# Patient Record
Sex: MALE | Race: WHITE | NOT HISPANIC OR LATINO | Employment: OTHER | URBAN - METROPOLITAN AREA
[De-identification: names, ages, dates, MRNs, and addresses within clinical notes are randomized per-mention and may not be internally consistent; named-entity substitution may affect disease eponyms.]

---

## 2021-01-01 ENCOUNTER — APPOINTMENT (OUTPATIENT)
Dept: GENERAL RADIOLOGY | Facility: HOSPITAL | Age: 71
End: 2021-01-01

## 2021-01-01 ENCOUNTER — READMISSION MANAGEMENT (OUTPATIENT)
Dept: CALL CENTER | Facility: HOSPITAL | Age: 71
End: 2021-01-01

## 2021-01-01 ENCOUNTER — APPOINTMENT (OUTPATIENT)
Dept: CT IMAGING | Facility: HOSPITAL | Age: 71
End: 2021-01-01

## 2021-01-01 ENCOUNTER — HOSPITAL ENCOUNTER (INPATIENT)
Facility: HOSPITAL | Age: 71
LOS: 2 days | Discharge: HOSPICE | End: 2021-03-04
Attending: EMERGENCY MEDICINE | Admitting: FAMILY MEDICINE

## 2021-01-01 ENCOUNTER — HOSPITAL ENCOUNTER (INPATIENT)
Facility: HOSPITAL | Age: 71
LOS: 1 days | End: 2021-03-04
Attending: INTERNAL MEDICINE | Admitting: INTERNAL MEDICINE

## 2021-01-01 ENCOUNTER — APPOINTMENT (OUTPATIENT)
Dept: CARDIOLOGY | Facility: HOSPITAL | Age: 71
End: 2021-01-01

## 2021-01-01 ENCOUNTER — HOSPITAL ENCOUNTER (INPATIENT)
Facility: HOSPITAL | Age: 71
LOS: 2 days | Discharge: HOME | End: 2021-01-18
Attending: EMERGENCY MEDICINE | Admitting: INTERNAL MEDICINE

## 2021-01-01 ENCOUNTER — HOSPITAL ENCOUNTER (OUTPATIENT)
Facility: HOSPITAL | Age: 71
Setting detail: OBSERVATION
Discharge: HOME | End: 2021-01-23
Attending: EMERGENCY MEDICINE | Admitting: INTERNAL MEDICINE

## 2021-01-01 VITALS — HEART RATE: 46 BPM | OXYGEN SATURATION: 81 %

## 2021-01-01 VITALS
HEART RATE: 80 BPM | HEIGHT: 67 IN | TEMPERATURE: 98.2 F | SYSTOLIC BLOOD PRESSURE: 144 MMHG | WEIGHT: 209.3 LBS | DIASTOLIC BLOOD PRESSURE: 96 MMHG | BODY MASS INDEX: 32.85 KG/M2 | OXYGEN SATURATION: 95 % | RESPIRATION RATE: 16 BRPM

## 2021-01-01 VITALS
WEIGHT: 215.4 LBS | DIASTOLIC BLOOD PRESSURE: 61 MMHG | OXYGEN SATURATION: 95 % | TEMPERATURE: 99 F | HEART RATE: 83 BPM | RESPIRATION RATE: 16 BRPM | SYSTOLIC BLOOD PRESSURE: 134 MMHG | BODY MASS INDEX: 34.62 KG/M2 | HEIGHT: 66 IN

## 2021-01-01 VITALS
OXYGEN SATURATION: 87 % | HEIGHT: 66 IN | SYSTOLIC BLOOD PRESSURE: 103 MMHG | RESPIRATION RATE: 28 BRPM | WEIGHT: 215.17 LBS | TEMPERATURE: 99.8 F | DIASTOLIC BLOOD PRESSURE: 46 MMHG | BODY MASS INDEX: 34.58 KG/M2 | HEART RATE: 109 BPM

## 2021-01-01 DIAGNOSIS — C61 PROSTATE CANCER METASTATIC TO BONE (HCC): ICD-10-CM

## 2021-01-01 DIAGNOSIS — K11.20 PAROTITIS: ICD-10-CM

## 2021-01-01 DIAGNOSIS — Z86.79 HISTORY OF CORONARY ARTERY DISEASE: ICD-10-CM

## 2021-01-01 DIAGNOSIS — N39.0 ACUTE UTI: ICD-10-CM

## 2021-01-01 DIAGNOSIS — C79.51 PROSTATE CANCER METASTATIC TO BONE (HCC): ICD-10-CM

## 2021-01-01 DIAGNOSIS — Z85.07 HISTORY OF PANCREATIC CANCER: ICD-10-CM

## 2021-01-01 DIAGNOSIS — R07.9 CHEST PAIN, UNSPECIFIED TYPE: ICD-10-CM

## 2021-01-01 DIAGNOSIS — R55 SYNCOPE, UNSPECIFIED SYNCOPE TYPE: Primary | ICD-10-CM

## 2021-01-01 DIAGNOSIS — R53.1 GENERALIZED WEAKNESS: Primary | ICD-10-CM

## 2021-01-01 DIAGNOSIS — R41.82 ALTERED MENTAL STATUS, UNSPECIFIED ALTERED MENTAL STATUS TYPE: Primary | ICD-10-CM

## 2021-01-01 DIAGNOSIS — R73.9 HYPERGLYCEMIA: ICD-10-CM

## 2021-01-01 DIAGNOSIS — R77.8 ELEVATED TROPONIN: ICD-10-CM

## 2021-01-01 DIAGNOSIS — E16.2 HYPOGLYCEMIA: ICD-10-CM

## 2021-01-01 DIAGNOSIS — R29.6 RECURRENT FALLS: ICD-10-CM

## 2021-01-01 DIAGNOSIS — E86.0 DEHYDRATION: ICD-10-CM

## 2021-01-01 LAB
ALBUMIN SERPL-MCNC: 1.5 G/DL (ref 3.5–5.2)
ALBUMIN SERPL-MCNC: 1.6 G/DL (ref 3.5–5.2)
ALBUMIN SERPL-MCNC: 2.2 G/DL (ref 3.5–5.2)
ALBUMIN SERPL-MCNC: 2.8 G/DL (ref 3.5–5.2)
ALBUMIN SERPL-MCNC: 3.4 G/DL (ref 3.5–5.2)
ALBUMIN SERPL-MCNC: 3.5 G/DL (ref 3.5–5.2)
ALBUMIN/GLOB SERPL: 0.3 G/DL
ALBUMIN/GLOB SERPL: 0.3 G/DL
ALBUMIN/GLOB SERPL: 0.5 G/DL
ALBUMIN/GLOB SERPL: 0.6 G/DL
ALBUMIN/GLOB SERPL: 0.7 G/DL
ALBUMIN/GLOB SERPL: 0.9 G/DL
ALP SERPL-CCNC: 511 U/L (ref 39–117)
ALP SERPL-CCNC: 534 U/L (ref 39–117)
ALP SERPL-CCNC: 536 U/L (ref 39–117)
ALP SERPL-CCNC: 574 U/L (ref 39–117)
ALP SERPL-CCNC: 640 U/L (ref 39–117)
ALP SERPL-CCNC: 696 U/L (ref 39–117)
ALT SERPL W P-5'-P-CCNC: 11 U/L (ref 1–41)
ALT SERPL W P-5'-P-CCNC: 13 U/L (ref 1–41)
ALT SERPL W P-5'-P-CCNC: 21 U/L (ref 1–41)
ALT SERPL W P-5'-P-CCNC: 6 U/L (ref 1–41)
ALT SERPL W P-5'-P-CCNC: 7 U/L (ref 1–41)
ALT SERPL W P-5'-P-CCNC: 8 U/L (ref 1–41)
ANION GAP SERPL CALCULATED.3IONS-SCNC: 10 MMOL/L (ref 5–15)
ANION GAP SERPL CALCULATED.3IONS-SCNC: 11 MMOL/L (ref 5–15)
ANION GAP SERPL CALCULATED.3IONS-SCNC: 11 MMOL/L (ref 5–15)
ANION GAP SERPL CALCULATED.3IONS-SCNC: 12 MMOL/L (ref 5–15)
ANION GAP SERPL CALCULATED.3IONS-SCNC: 13 MMOL/L (ref 5–15)
ANION GAP SERPL CALCULATED.3IONS-SCNC: 14 MMOL/L (ref 5–15)
ANION GAP SERPL CALCULATED.3IONS-SCNC: 19 MMOL/L (ref 5–15)
ANION GAP SERPL CALCULATED.3IONS-SCNC: 20 MMOL/L (ref 5–15)
ANISOCYTOSIS BLD QL: ABNORMAL
ANISOCYTOSIS BLD QL: NORMAL
ARTERIAL PATENCY WRIST A: ABNORMAL
ARTERIAL PATENCY WRIST A: ABNORMAL
AST SERPL-CCNC: 108 U/L (ref 1–40)
AST SERPL-CCNC: 22 U/L (ref 1–40)
AST SERPL-CCNC: 27 U/L (ref 1–40)
AST SERPL-CCNC: 34 U/L (ref 1–40)
AST SERPL-CCNC: 48 U/L (ref 1–40)
AST SERPL-CCNC: 70 U/L (ref 1–40)
ATMOSPHERIC PRESS: ABNORMAL MM[HG]
ATMOSPHERIC PRESS: ABNORMAL MM[HG]
B PARAPERT DNA SPEC QL NAA+PROBE: NOT DETECTED
B PERT DNA SPEC QL NAA+PROBE: NOT DETECTED
B-OH-BUTYR SERPL-SCNC: 4.37 MMOL/L (ref 0.02–0.27)
BACTERIA BLD CULT: ABNORMAL
BACTERIA SPEC AEROBE CULT: ABNORMAL
BACTERIA SPEC AEROBE CULT: NORMAL
BACTERIA SPEC AEROBE CULT: NORMAL
BACTERIA UR QL AUTO: ABNORMAL /HPF
BACTERIA UR QL AUTO: NORMAL /HPF
BASE EXCESS BLDA CALC-SCNC: -3.3 MMOL/L (ref 0–2)
BASE EXCESS BLDA CALC-SCNC: 1.1 MMOL/L (ref 0–2)
BASOPHILS # BLD AUTO: 0.02 10*3/MM3 (ref 0–0.2)
BASOPHILS # BLD AUTO: 0.03 10*3/MM3 (ref 0–0.2)
BASOPHILS # BLD AUTO: 0.03 10*3/MM3 (ref 0–0.2)
BASOPHILS # BLD AUTO: 0.05 10*3/MM3 (ref 0–0.2)
BASOPHILS # BLD AUTO: 0.07 10*3/MM3 (ref 0–0.2)
BASOPHILS # BLD MANUAL: 0 10*3/MM3 (ref 0–0.2)
BASOPHILS # BLD MANUAL: 0.13 10*3/MM3 (ref 0–0.2)
BASOPHILS NFR BLD AUTO: 0 % (ref 0–1.5)
BASOPHILS NFR BLD AUTO: 0.5 % (ref 0–1.5)
BASOPHILS NFR BLD AUTO: 0.6 % (ref 0–1.5)
BASOPHILS NFR BLD AUTO: 0.7 % (ref 0–1.5)
BASOPHILS NFR BLD AUTO: 0.8 % (ref 0–1.5)
BASOPHILS NFR BLD AUTO: 1 % (ref 0–1.5)
BASOPHILS NFR BLD AUTO: 1 % (ref 0–1.5)
BDY SITE: ABNORMAL
BH CV ECHO MEAS - AO MAX PG (FULL): 8.8 MMHG
BH CV ECHO MEAS - AO MAX PG: 13.2 MMHG
BH CV ECHO MEAS - AO ROOT AREA (BSA CORRECTED): 1.6
BH CV ECHO MEAS - AO ROOT AREA: 8.5 CM^2
BH CV ECHO MEAS - AO ROOT DIAM: 3.3 CM
BH CV ECHO MEAS - AO V2 MAX: 181.3 CM/SEC
BH CV ECHO MEAS - AVA(V,A): 2.8 CM^2
BH CV ECHO MEAS - AVA(V,D): 2.8 CM^2
BH CV ECHO MEAS - BSA(HAYCOCK): 2.2 M^2
BH CV ECHO MEAS - BSA: 2.1 M^2
BH CV ECHO MEAS - BZI_BMI: 35.2 KILOGRAMS/M^2
BH CV ECHO MEAS - BZI_METRIC_HEIGHT: 167.6 CM
BH CV ECHO MEAS - BZI_METRIC_WEIGHT: 98.9 KG
BH CV ECHO MEAS - EDV(CUBED): 180.7 ML
BH CV ECHO MEAS - EDV(TEICH): 157 ML
BH CV ECHO MEAS - EF(CUBED): 50.5 %
BH CV ECHO MEAS - EF(TEICH): 42 %
BH CV ECHO MEAS - ESV(CUBED): 89.4 ML
BH CV ECHO MEAS - ESV(TEICH): 91 ML
BH CV ECHO MEAS - FS: 20.9 %
BH CV ECHO MEAS - IVS/LVPW: 0.86
BH CV ECHO MEAS - IVSD: 0.91 CM
BH CV ECHO MEAS - LA DIMENSION: 4.4 CM
BH CV ECHO MEAS - LA/AO: 1.3
BH CV ECHO MEAS - LV MASS(C)D: 219 GRAMS
BH CV ECHO MEAS - LV MASS(C)DI: 105.6 GRAMS/M^2
BH CV ECHO MEAS - LV MAX PG: 4.4 MMHG
BH CV ECHO MEAS - LV MEAN PG: 2.2 MMHG
BH CV ECHO MEAS - LV V1 MAX: 104.6 CM/SEC
BH CV ECHO MEAS - LV V1 MEAN: 67.4 CM/SEC
BH CV ECHO MEAS - LV V1 VTI: 19.4 CM
BH CV ECHO MEAS - LVIDD: 5.7 CM
BH CV ECHO MEAS - LVIDS: 4.5 CM
BH CV ECHO MEAS - LVOT AREA (M): 4.9 CM^2
BH CV ECHO MEAS - LVOT AREA: 4.8 CM^2
BH CV ECHO MEAS - LVOT DIAM: 2.5 CM
BH CV ECHO MEAS - LVPWD: 1.1 CM
BH CV ECHO MEAS - PA ACC SLOPE: 869.4 CM/SEC^2
BH CV ECHO MEAS - PA ACC TIME: 0.13 SEC
BH CV ECHO MEAS - PA MAX PG: 8.1 MMHG
BH CV ECHO MEAS - PA PR(ACCEL): 20.4 MMHG
BH CV ECHO MEAS - PA V2 MAX: 142.3 CM/SEC
BH CV ECHO MEAS - SI(CUBED): 44 ML/M^2
BH CV ECHO MEAS - SI(LVOT): 45.4 ML/M^2
BH CV ECHO MEAS - SI(TEICH): 31.8 ML/M^2
BH CV ECHO MEAS - SV(CUBED): 91.3 ML
BH CV ECHO MEAS - SV(LVOT): 94.1 ML
BH CV ECHO MEAS - SV(TEICH): 66 ML
BILIRUB SERPL-MCNC: 0.3 MG/DL (ref 0–1.2)
BILIRUB SERPL-MCNC: 0.8 MG/DL (ref 0–1.2)
BILIRUB SERPL-MCNC: 0.8 MG/DL (ref 0–1.2)
BILIRUB SERPL-MCNC: 1 MG/DL (ref 0–1.2)
BILIRUB UR QL STRIP: NEGATIVE
BODY TEMPERATURE: 37 C
BODY TEMPERATURE: 37 C
BUN SERPL-MCNC: 10 MG/DL (ref 8–23)
BUN SERPL-MCNC: 12 MG/DL (ref 8–23)
BUN SERPL-MCNC: 12 MG/DL (ref 8–23)
BUN SERPL-MCNC: 13 MG/DL (ref 8–23)
BUN SERPL-MCNC: 14 MG/DL (ref 8–23)
BUN SERPL-MCNC: 14 MG/DL (ref 8–23)
BUN SERPL-MCNC: 15 MG/DL (ref 8–23)
BUN SERPL-MCNC: 15 MG/DL (ref 8–23)
BUN SERPL-MCNC: 45 MG/DL (ref 8–23)
BUN SERPL-MCNC: 53 MG/DL (ref 8–23)
BUN SERPL-MCNC: 77 MG/DL (ref 8–23)
BUN SERPL-MCNC: 90 MG/DL (ref 8–23)
BUN/CREAT SERPL: 10.2 (ref 7–25)
BUN/CREAT SERPL: 12 (ref 7–25)
BUN/CREAT SERPL: 12 (ref 7–25)
BUN/CREAT SERPL: 15.2 (ref 7–25)
BUN/CREAT SERPL: 15.9 (ref 7–25)
BUN/CREAT SERPL: 16.3 (ref 7–25)
BUN/CREAT SERPL: 17.6 (ref 7–25)
BUN/CREAT SERPL: 18.8 (ref 7–25)
BUN/CREAT SERPL: 28.7 (ref 7–25)
BUN/CREAT SERPL: 35.3 (ref 7–25)
BUN/CREAT SERPL: 38.1 (ref 7–25)
BUN/CREAT SERPL: 38.7 (ref 7–25)
C PNEUM DNA NPH QL NAA+NON-PROBE: NOT DETECTED
CALCIUM SPEC-SCNC: 7.1 MG/DL (ref 8.6–10.5)
CALCIUM SPEC-SCNC: 7.1 MG/DL (ref 8.6–10.5)
CALCIUM SPEC-SCNC: 7.5 MG/DL (ref 8.6–10.5)
CALCIUM SPEC-SCNC: 8.2 MG/DL (ref 8.6–10.5)
CALCIUM SPEC-SCNC: 8.5 MG/DL (ref 8.6–10.5)
CALCIUM SPEC-SCNC: 8.6 MG/DL (ref 8.6–10.5)
CALCIUM SPEC-SCNC: 8.6 MG/DL (ref 8.6–10.5)
CALCIUM SPEC-SCNC: 8.7 MG/DL (ref 8.6–10.5)
CALCIUM SPEC-SCNC: 9.1 MG/DL (ref 8.6–10.5)
CALCIUM SPEC-SCNC: 9.2 MG/DL (ref 8.6–10.5)
CHLORIDE SERPL-SCNC: 102 MMOL/L (ref 98–107)
CHLORIDE SERPL-SCNC: 103 MMOL/L (ref 98–107)
CHLORIDE SERPL-SCNC: 104 MMOL/L (ref 98–107)
CHLORIDE SERPL-SCNC: 105 MMOL/L (ref 98–107)
CHLORIDE SERPL-SCNC: 105 MMOL/L (ref 98–107)
CHLORIDE SERPL-SCNC: 109 MMOL/L (ref 98–107)
CHLORIDE SERPL-SCNC: 113 MMOL/L (ref 98–107)
CHLORIDE SERPL-SCNC: 115 MMOL/L (ref 98–107)
CHLORIDE SERPL-SCNC: 93 MMOL/L (ref 98–107)
CHLORIDE SERPL-SCNC: 94 MMOL/L (ref 98–107)
CHLORIDE SERPL-SCNC: 97 MMOL/L (ref 98–107)
CHLORIDE SERPL-SCNC: 98 MMOL/L (ref 98–107)
CHOLEST SERPL-MCNC: 88 MG/DL (ref 0–200)
CK SERPL-CCNC: 68 U/L (ref 20–200)
CLARITY UR: ABNORMAL
CLARITY UR: ABNORMAL
CLARITY UR: CLEAR
CO2 BLDA-SCNC: 22.2 MMOL/L (ref 22–33)
CO2 BLDA-SCNC: 26.1 MMOL/L (ref 22–33)
CO2 SERPL-SCNC: 18 MMOL/L (ref 22–29)
CO2 SERPL-SCNC: 21 MMOL/L (ref 22–29)
CO2 SERPL-SCNC: 21 MMOL/L (ref 22–29)
CO2 SERPL-SCNC: 22 MMOL/L (ref 22–29)
CO2 SERPL-SCNC: 22 MMOL/L (ref 22–29)
CO2 SERPL-SCNC: 23 MMOL/L (ref 22–29)
CO2 SERPL-SCNC: 24 MMOL/L (ref 22–29)
CO2 SERPL-SCNC: 25 MMOL/L (ref 22–29)
CO2 SERPL-SCNC: 26 MMOL/L (ref 22–29)
CO2 SERPL-SCNC: 27 MMOL/L (ref 22–29)
COHGB MFR BLD: 1.1 % (ref 0–2)
COHGB MFR BLD: 1.4 % (ref 0–2)
COLOR UR: ABNORMAL
COLOR UR: YELLOW
COLOR UR: YELLOW
CORTIS AM PEAK SERPL-MCNC: 17.79 MCG/DL
CREAT SERPL-MCNC: 0.68 MG/DL (ref 0.76–1.27)
CREAT SERPL-MCNC: 0.8 MG/DL (ref 0.76–1.27)
CREAT SERPL-MCNC: 0.83 MG/DL (ref 0.76–1.27)
CREAT SERPL-MCNC: 0.86 MG/DL (ref 0.76–1.27)
CREAT SERPL-MCNC: 0.88 MG/DL (ref 0.76–1.27)
CREAT SERPL-MCNC: 0.99 MG/DL (ref 0.76–1.27)
CREAT SERPL-MCNC: 1.08 MG/DL (ref 0.76–1.27)
CREAT SERPL-MCNC: 1.18 MG/DL (ref 0.76–1.27)
CREAT SERPL-MCNC: 1.5 MG/DL (ref 0.76–1.27)
CREAT SERPL-MCNC: 1.57 MG/DL (ref 0.76–1.27)
CREAT SERPL-MCNC: 1.99 MG/DL (ref 0.76–1.27)
CREAT SERPL-MCNC: 2.36 MG/DL (ref 0.76–1.27)
D DIMER PPP FEU-MCNC: 19.93 MCGFEU/ML (ref 0–0.56)
D-LACTATE SERPL-SCNC: 1.4 MMOL/L (ref 0.5–2)
D-LACTATE SERPL-SCNC: 2.5 MMOL/L (ref 0.5–2)
D-LACTATE SERPL-SCNC: 2.5 MMOL/L (ref 0.5–2)
D-LACTATE SERPL-SCNC: 2.9 MMOL/L (ref 0.5–2)
D-LACTATE SERPL-SCNC: 4.1 MMOL/L (ref 0.5–2)
DACRYOCYTES BLD QL SMEAR: ABNORMAL
DACRYOCYTES BLD QL SMEAR: NORMAL
DEPRECATED RDW RBC AUTO: 70.5 FL (ref 37–54)
DEPRECATED RDW RBC AUTO: 70.7 FL (ref 37–54)
DEPRECATED RDW RBC AUTO: 71.3 FL (ref 37–54)
DEPRECATED RDW RBC AUTO: 72.4 FL (ref 37–54)
DEPRECATED RDW RBC AUTO: 72.7 FL (ref 37–54)
DEPRECATED RDW RBC AUTO: 73.3 FL (ref 37–54)
DEPRECATED RDW RBC AUTO: 73.7 FL (ref 37–54)
DEPRECATED RDW RBC AUTO: 76.8 FL (ref 37–54)
EOSINOPHIL # BLD AUTO: 0.02 10*3/MM3 (ref 0–0.4)
EOSINOPHIL # BLD AUTO: 0.03 10*3/MM3 (ref 0–0.4)
EOSINOPHIL # BLD AUTO: 0.04 10*3/MM3 (ref 0–0.4)
EOSINOPHIL # BLD AUTO: 0.07 10*3/MM3 (ref 0–0.4)
EOSINOPHIL # BLD AUTO: 0.12 10*3/MM3 (ref 0–0.4)
EOSINOPHIL # BLD MANUAL: 0 10*3/MM3 (ref 0–0.4)
EOSINOPHIL # BLD MANUAL: 0 10*3/MM3 (ref 0–0.4)
EOSINOPHIL NFR BLD AUTO: 0.3 % (ref 0.3–6.2)
EOSINOPHIL NFR BLD AUTO: 0.7 % (ref 0.3–6.2)
EOSINOPHIL NFR BLD AUTO: 0.9 % (ref 0.3–6.2)
EOSINOPHIL NFR BLD AUTO: 1.4 % (ref 0.3–6.2)
EOSINOPHIL NFR BLD AUTO: 1.9 % (ref 0.3–6.2)
EOSINOPHIL NFR BLD MANUAL: 0 % (ref 0.3–6.2)
EOSINOPHIL NFR BLD MANUAL: 0 % (ref 0.3–6.2)
EPAP: 0
ERYTHROCYTE [DISTWIDTH] IN BLOOD BY AUTOMATED COUNT: 19.9 % (ref 12.3–15.4)
ERYTHROCYTE [DISTWIDTH] IN BLOOD BY AUTOMATED COUNT: 20 % (ref 12.3–15.4)
ERYTHROCYTE [DISTWIDTH] IN BLOOD BY AUTOMATED COUNT: 20.1 % (ref 12.3–15.4)
ERYTHROCYTE [DISTWIDTH] IN BLOOD BY AUTOMATED COUNT: 20.3 % (ref 12.3–15.4)
ERYTHROCYTE [DISTWIDTH] IN BLOOD BY AUTOMATED COUNT: 20.9 % (ref 12.3–15.4)
ERYTHROCYTE [DISTWIDTH] IN BLOOD BY AUTOMATED COUNT: 21.2 % (ref 12.3–15.4)
FINE GRAN CASTS URNS QL MICRO: ABNORMAL /LPF
FLUAV RNA RESP QL NAA+PROBE: NOT DETECTED
FLUAV RNA RESP QL NAA+PROBE: NOT DETECTED
FLUAV SUBTYP SPEC NAA+PROBE: NOT DETECTED
FLUBV RNA ISLT QL NAA+PROBE: NOT DETECTED
FLUBV RNA RESP QL NAA+PROBE: NOT DETECTED
FLUBV RNA RESP QL NAA+PROBE: NOT DETECTED
GFR SERPL CREATININE-BSD FRML MDRD: 116 ML/MIN/1.73
GFR SERPL CREATININE-BSD FRML MDRD: 28 ML/MIN/1.73
GFR SERPL CREATININE-BSD FRML MDRD: 34 ML/MIN/1.73
GFR SERPL CREATININE-BSD FRML MDRD: 44 ML/MIN/1.73
GFR SERPL CREATININE-BSD FRML MDRD: 47 ML/MIN/1.73
GFR SERPL CREATININE-BSD FRML MDRD: 61 ML/MIN/1.73
GFR SERPL CREATININE-BSD FRML MDRD: 68 ML/MIN/1.73
GFR SERPL CREATININE-BSD FRML MDRD: 75 ML/MIN/1.73
GFR SERPL CREATININE-BSD FRML MDRD: 86 ML/MIN/1.73
GFR SERPL CREATININE-BSD FRML MDRD: 88 ML/MIN/1.73
GFR SERPL CREATININE-BSD FRML MDRD: 92 ML/MIN/1.73
GFR SERPL CREATININE-BSD FRML MDRD: 96 ML/MIN/1.73
GLOBULIN UR ELPH-MCNC: 4.1 GM/DL
GLOBULIN UR ELPH-MCNC: 4.7 GM/DL
GLOBULIN UR ELPH-MCNC: 4.8 GM/DL
GLOBULIN UR ELPH-MCNC: 5.1 GM/DL
GLUCOSE BLDC GLUCOMTR-MCNC: 101 MG/DL (ref 70–130)
GLUCOSE BLDC GLUCOMTR-MCNC: 105 MG/DL (ref 70–130)
GLUCOSE BLDC GLUCOMTR-MCNC: 112 MG/DL (ref 70–130)
GLUCOSE BLDC GLUCOMTR-MCNC: 149 MG/DL (ref 70–130)
GLUCOSE BLDC GLUCOMTR-MCNC: 150 MG/DL (ref 70–130)
GLUCOSE BLDC GLUCOMTR-MCNC: 159 MG/DL (ref 70–130)
GLUCOSE BLDC GLUCOMTR-MCNC: 159 MG/DL (ref 70–130)
GLUCOSE BLDC GLUCOMTR-MCNC: 181 MG/DL (ref 70–130)
GLUCOSE BLDC GLUCOMTR-MCNC: 195 MG/DL (ref 70–130)
GLUCOSE BLDC GLUCOMTR-MCNC: 199 MG/DL (ref 70–130)
GLUCOSE BLDC GLUCOMTR-MCNC: 211 MG/DL (ref 70–130)
GLUCOSE BLDC GLUCOMTR-MCNC: 265 MG/DL (ref 70–130)
GLUCOSE BLDC GLUCOMTR-MCNC: 265 MG/DL (ref 70–130)
GLUCOSE BLDC GLUCOMTR-MCNC: 288 MG/DL (ref 70–130)
GLUCOSE BLDC GLUCOMTR-MCNC: 302 MG/DL (ref 70–130)
GLUCOSE BLDC GLUCOMTR-MCNC: 302 MG/DL (ref 70–130)
GLUCOSE BLDC GLUCOMTR-MCNC: 305 MG/DL (ref 70–130)
GLUCOSE BLDC GLUCOMTR-MCNC: 312 MG/DL (ref 70–130)
GLUCOSE BLDC GLUCOMTR-MCNC: 315 MG/DL (ref 70–130)
GLUCOSE BLDC GLUCOMTR-MCNC: 332 MG/DL (ref 70–130)
GLUCOSE BLDC GLUCOMTR-MCNC: 339 MG/DL (ref 70–130)
GLUCOSE BLDC GLUCOMTR-MCNC: 348 MG/DL (ref 70–130)
GLUCOSE BLDC GLUCOMTR-MCNC: 349 MG/DL (ref 70–130)
GLUCOSE BLDC GLUCOMTR-MCNC: 357 MG/DL (ref 70–130)
GLUCOSE BLDC GLUCOMTR-MCNC: 379 MG/DL (ref 70–130)
GLUCOSE BLDC GLUCOMTR-MCNC: 381 MG/DL (ref 70–130)
GLUCOSE BLDC GLUCOMTR-MCNC: 385 MG/DL (ref 70–130)
GLUCOSE BLDC GLUCOMTR-MCNC: 386 MG/DL (ref 70–130)
GLUCOSE BLDC GLUCOMTR-MCNC: 388 MG/DL (ref 70–130)
GLUCOSE BLDC GLUCOMTR-MCNC: 396 MG/DL (ref 70–130)
GLUCOSE BLDC GLUCOMTR-MCNC: 402 MG/DL (ref 70–130)
GLUCOSE BLDC GLUCOMTR-MCNC: 404 MG/DL (ref 70–130)
GLUCOSE BLDC GLUCOMTR-MCNC: 404 MG/DL (ref 70–130)
GLUCOSE BLDC GLUCOMTR-MCNC: 408 MG/DL (ref 70–130)
GLUCOSE BLDC GLUCOMTR-MCNC: 41 MG/DL (ref 70–130)
GLUCOSE BLDC GLUCOMTR-MCNC: 414 MG/DL (ref 70–130)
GLUCOSE BLDC GLUCOMTR-MCNC: 418 MG/DL (ref 70–130)
GLUCOSE BLDC GLUCOMTR-MCNC: 419 MG/DL (ref 70–130)
GLUCOSE BLDC GLUCOMTR-MCNC: 422 MG/DL (ref 70–130)
GLUCOSE BLDC GLUCOMTR-MCNC: 429 MG/DL (ref 70–130)
GLUCOSE BLDC GLUCOMTR-MCNC: 429 MG/DL (ref 70–130)
GLUCOSE BLDC GLUCOMTR-MCNC: 432 MG/DL (ref 70–130)
GLUCOSE BLDC GLUCOMTR-MCNC: 435 MG/DL (ref 70–130)
GLUCOSE BLDC GLUCOMTR-MCNC: 446 MG/DL (ref 70–130)
GLUCOSE BLDC GLUCOMTR-MCNC: 449 MG/DL (ref 70–130)
GLUCOSE BLDC GLUCOMTR-MCNC: 449 MG/DL (ref 70–130)
GLUCOSE BLDC GLUCOMTR-MCNC: 450 MG/DL (ref 70–130)
GLUCOSE BLDC GLUCOMTR-MCNC: 474 MG/DL (ref 70–130)
GLUCOSE BLDC GLUCOMTR-MCNC: 476 MG/DL (ref 70–130)
GLUCOSE BLDC GLUCOMTR-MCNC: 478 MG/DL (ref 70–130)
GLUCOSE BLDC GLUCOMTR-MCNC: 488 MG/DL (ref 70–130)
GLUCOSE BLDC GLUCOMTR-MCNC: 491 MG/DL (ref 70–130)
GLUCOSE BLDC GLUCOMTR-MCNC: 491 MG/DL (ref 70–130)
GLUCOSE BLDC GLUCOMTR-MCNC: 493 MG/DL (ref 70–130)
GLUCOSE BLDC GLUCOMTR-MCNC: 505 MG/DL (ref 70–130)
GLUCOSE BLDC GLUCOMTR-MCNC: 51 MG/DL (ref 70–130)
GLUCOSE BLDC GLUCOMTR-MCNC: 519 MG/DL (ref 70–130)
GLUCOSE BLDC GLUCOMTR-MCNC: 537 MG/DL (ref 70–130)
GLUCOSE BLDC GLUCOMTR-MCNC: 545 MG/DL (ref 70–130)
GLUCOSE BLDC GLUCOMTR-MCNC: 547 MG/DL (ref 70–130)
GLUCOSE BLDC GLUCOMTR-MCNC: 568 MG/DL (ref 70–130)
GLUCOSE BLDC GLUCOMTR-MCNC: 573 MG/DL (ref 70–130)
GLUCOSE BLDC GLUCOMTR-MCNC: 76 MG/DL (ref 70–130)
GLUCOSE BLDC GLUCOMTR-MCNC: >599 MG/DL (ref 70–130)
GLUCOSE SERPL-MCNC: 144 MG/DL (ref 65–99)
GLUCOSE SERPL-MCNC: 159 MG/DL (ref 65–99)
GLUCOSE SERPL-MCNC: 239 MG/DL (ref 65–99)
GLUCOSE SERPL-MCNC: 380 MG/DL (ref 65–99)
GLUCOSE SERPL-MCNC: 386 MG/DL (ref 65–99)
GLUCOSE SERPL-MCNC: 411 MG/DL (ref 65–99)
GLUCOSE SERPL-MCNC: 414 MG/DL (ref 65–99)
GLUCOSE SERPL-MCNC: 443 MG/DL (ref 65–99)
GLUCOSE SERPL-MCNC: 488 MG/DL (ref 65–99)
GLUCOSE SERPL-MCNC: 498 MG/DL (ref 65–99)
GLUCOSE SERPL-MCNC: 546 MG/DL (ref 65–99)
GLUCOSE SERPL-MCNC: 58 MG/DL (ref 65–99)
GLUCOSE UR STRIP-MCNC: ABNORMAL MG/DL
GLUCOSE UR STRIP-MCNC: NEGATIVE MG/DL
GLUCOSE UR STRIP-MCNC: NEGATIVE MG/DL
HADV DNA SPEC NAA+PROBE: NOT DETECTED
HBA1C MFR BLD: 9.5 % (ref 4.8–5.6)
HCO3 BLDA-SCNC: 21.2 MMOL/L (ref 20–26)
HCO3 BLDA-SCNC: 25 MMOL/L (ref 20–26)
HCOV 229E RNA SPEC QL NAA+PROBE: NOT DETECTED
HCOV HKU1 RNA SPEC QL NAA+PROBE: NOT DETECTED
HCOV NL63 RNA SPEC QL NAA+PROBE: NOT DETECTED
HCOV OC43 RNA SPEC QL NAA+PROBE: NOT DETECTED
HCT VFR BLD AUTO: 28.5 % (ref 37.5–51)
HCT VFR BLD AUTO: 29.6 % (ref 37.5–51)
HCT VFR BLD AUTO: 29.8 % (ref 37.5–51)
HCT VFR BLD AUTO: 30.4 % (ref 37.5–51)
HCT VFR BLD AUTO: 33.2 % (ref 37.5–51)
HCT VFR BLD AUTO: 33.8 % (ref 37.5–51)
HCT VFR BLD AUTO: 34.5 % (ref 37.5–51)
HCT VFR BLD AUTO: 34.6 % (ref 37.5–51)
HCT VFR BLD CALC: 23.7 %
HCT VFR BLD CALC: 29.8 %
HDLC SERPL-MCNC: 32 MG/DL (ref 40–60)
HGB BLD-MCNC: 10.1 G/DL (ref 13–17.7)
HGB BLD-MCNC: 10.6 G/DL (ref 13–17.7)
HGB BLD-MCNC: 8.3 G/DL (ref 13–17.7)
HGB BLD-MCNC: 8.4 G/DL (ref 13–17.7)
HGB BLD-MCNC: 9 G/DL (ref 13–17.7)
HGB BLD-MCNC: 9.1 G/DL (ref 13–17.7)
HGB BLD-MCNC: 9.8 G/DL (ref 13–17.7)
HGB BLD-MCNC: 9.9 G/DL (ref 13–17.7)
HGB BLDA-MCNC: 7.7 G/DL (ref 13.5–17.5)
HGB BLDA-MCNC: 9.7 G/DL (ref 13.5–17.5)
HGB UR QL STRIP.AUTO: ABNORMAL
HGB UR QL STRIP.AUTO: ABNORMAL
HGB UR QL STRIP.AUTO: NEGATIVE
HMPV RNA NPH QL NAA+NON-PROBE: NOT DETECTED
HOLD SPECIMEN: NORMAL
HPIV1 RNA SPEC QL NAA+PROBE: NOT DETECTED
HPIV2 RNA SPEC QL NAA+PROBE: NOT DETECTED
HPIV3 RNA NPH QL NAA+PROBE: NOT DETECTED
HPIV4 P GENE NPH QL NAA+PROBE: NOT DETECTED
HYALINE CASTS UR QL AUTO: ABNORMAL /LPF
HYALINE CASTS UR QL AUTO: NORMAL /LPF
IMM GRANULOCYTES # BLD AUTO: 0.04 10*3/MM3 (ref 0–0.05)
IMM GRANULOCYTES # BLD AUTO: 0.06 10*3/MM3 (ref 0–0.05)
IMM GRANULOCYTES # BLD AUTO: 0.16 10*3/MM3 (ref 0–0.05)
IMM GRANULOCYTES # BLD AUTO: 0.21 10*3/MM3 (ref 0–0.05)
IMM GRANULOCYTES # BLD AUTO: 0.44 10*3/MM3 (ref 0–0.05)
IMM GRANULOCYTES NFR BLD AUTO: 0.9 % (ref 0–0.5)
IMM GRANULOCYTES NFR BLD AUTO: 1.4 % (ref 0–0.5)
IMM GRANULOCYTES NFR BLD AUTO: 2.6 % (ref 0–0.5)
IMM GRANULOCYTES NFR BLD AUTO: 4.1 % (ref 0–0.5)
IMM GRANULOCYTES NFR BLD AUTO: 6.5 % (ref 0–0.5)
INHALED O2 CONCENTRATION: 21 %
INHALED O2 CONCENTRATION: 40 %
INR PPP: 1.43 (ref 0.85–1.16)
IPAP: 0
KETONES UR QL STRIP: ABNORMAL
KETONES UR QL STRIP: NEGATIVE
KETONES UR QL STRIP: NEGATIVE
LACTATE HOLD SPECIMEN: NORMAL
LACTATE HOLD SPECIMEN: NORMAL
LDLC SERPL CALC-MCNC: 25 MG/DL (ref 0–100)
LDLC/HDLC SERPL: 0.55 {RATIO}
LEUKOCYTE ESTERASE UR QL STRIP.AUTO: ABNORMAL
LEUKOCYTE ESTERASE UR QL STRIP.AUTO: NEGATIVE
LEUKOCYTE ESTERASE UR QL STRIP.AUTO: NEGATIVE
LIPASE SERPL-CCNC: 19 U/L (ref 13–60)
LV EF 2D ECHO EST: 55 %
LYMPHOCYTES # BLD AUTO: 0.91 10*3/MM3 (ref 0.7–3.1)
LYMPHOCYTES # BLD AUTO: 1.02 10*3/MM3 (ref 0.7–3.1)
LYMPHOCYTES # BLD AUTO: 1.03 10*3/MM3 (ref 0.7–3.1)
LYMPHOCYTES # BLD AUTO: 1.26 10*3/MM3 (ref 0.7–3.1)
LYMPHOCYTES # BLD AUTO: 1.52 10*3/MM3 (ref 0.7–3.1)
LYMPHOCYTES # BLD MANUAL: 1.19 10*3/MM3 (ref 0.7–3.1)
LYMPHOCYTES # BLD MANUAL: 1.68 10*3/MM3 (ref 0.7–3.1)
LYMPHOCYTES NFR BLD AUTO: 15.3 % (ref 19.6–45.3)
LYMPHOCYTES NFR BLD AUTO: 21.2 % (ref 19.6–45.3)
LYMPHOCYTES NFR BLD AUTO: 23 % (ref 19.6–45.3)
LYMPHOCYTES NFR BLD AUTO: 24.3 % (ref 19.6–45.3)
LYMPHOCYTES NFR BLD AUTO: 24.8 % (ref 19.6–45.3)
LYMPHOCYTES NFR BLD MANUAL: 13 % (ref 19.6–45.3)
LYMPHOCYTES NFR BLD MANUAL: 18 % (ref 19.6–45.3)
LYMPHOCYTES NFR BLD MANUAL: 5 % (ref 5–12)
LYMPHOCYTES NFR BLD MANUAL: 8 % (ref 5–12)
M PNEUMO IGG SER IA-ACNC: NOT DETECTED
MACROCYTES BLD QL SMEAR: ABNORMAL
MACROCYTES BLD QL SMEAR: NORMAL
MAGNESIUM SERPL-MCNC: 1.9 MG/DL (ref 1.6–2.4)
MAGNESIUM SERPL-MCNC: 1.9 MG/DL (ref 1.6–2.4)
MAGNESIUM SERPL-MCNC: 2 MG/DL (ref 1.6–2.4)
MAGNESIUM SERPL-MCNC: 2.1 MG/DL (ref 1.6–2.4)
MAGNESIUM SERPL-MCNC: 2.2 MG/DL (ref 1.6–2.4)
MAGNESIUM SERPL-MCNC: 2.2 MG/DL (ref 1.6–2.4)
MAGNESIUM SERPL-MCNC: 2.3 MG/DL (ref 1.6–2.4)
MAGNESIUM SERPL-MCNC: 2.6 MG/DL (ref 1.6–2.4)
MAXIMAL PREDICTED HEART RATE: 152 BPM
MCH RBC QN AUTO: 28.2 PG (ref 26.6–33)
MCH RBC QN AUTO: 28.5 PG (ref 26.6–33)
MCH RBC QN AUTO: 28.6 PG (ref 26.6–33)
MCH RBC QN AUTO: 28.9 PG (ref 26.6–33)
MCH RBC QN AUTO: 29.4 PG (ref 26.6–33)
MCH RBC QN AUTO: 29.5 PG (ref 26.6–33)
MCH RBC QN AUTO: 29.7 PG (ref 26.6–33)
MCH RBC QN AUTO: 29.7 PG (ref 26.6–33)
MCHC RBC AUTO-ENTMCNC: 28 G/DL (ref 31.5–35.7)
MCHC RBC AUTO-ENTMCNC: 29 G/DL (ref 31.5–35.7)
MCHC RBC AUTO-ENTMCNC: 29.3 G/DL (ref 31.5–35.7)
MCHC RBC AUTO-ENTMCNC: 29.5 G/DL (ref 31.5–35.7)
MCHC RBC AUTO-ENTMCNC: 29.6 G/DL (ref 31.5–35.7)
MCHC RBC AUTO-ENTMCNC: 29.8 G/DL (ref 31.5–35.7)
MCHC RBC AUTO-ENTMCNC: 30.5 G/DL (ref 31.5–35.7)
MCHC RBC AUTO-ENTMCNC: 30.6 G/DL (ref 31.5–35.7)
MCV RBC AUTO: 100.7 FL (ref 79–97)
MCV RBC AUTO: 101.5 FL (ref 79–97)
MCV RBC AUTO: 96.2 FL (ref 79–97)
MCV RBC AUTO: 96.8 FL (ref 79–97)
MCV RBC AUTO: 96.9 FL (ref 79–97)
MCV RBC AUTO: 96.9 FL (ref 79–97)
MCV RBC AUTO: 98.6 FL (ref 79–97)
MCV RBC AUTO: 99.7 FL (ref 79–97)
METAMYELOCYTES NFR BLD MANUAL: 1 % (ref 0–0)
METAMYELOCYTES NFR BLD MANUAL: 2 % (ref 0–0)
METHGB BLD QL: 0.2 % (ref 0–1.5)
METHGB BLD QL: 0.5 % (ref 0–1.5)
MODALITY: ABNORMAL
MODALITY: ABNORMAL
MONOCYTES # BLD AUTO: 0.37 10*3/MM3 (ref 0.1–0.9)
MONOCYTES # BLD AUTO: 0.43 10*3/MM3 (ref 0.1–0.9)
MONOCYTES # BLD AUTO: 0.53 10*3/MM3 (ref 0.1–0.9)
MONOCYTES # BLD AUTO: 0.6 10*3/MM3 (ref 0.1–0.9)
MONOCYTES # BLD AUTO: 0.64 10*3/MM3 (ref 0.1–0.9)
MONOCYTES # BLD AUTO: 0.66 10*3/MM3 (ref 0.1–0.9)
MONOCYTES # BLD AUTO: 0.87 10*3/MM3 (ref 0.1–0.9)
MONOCYTES NFR BLD AUTO: 13 % (ref 5–12)
MONOCYTES NFR BLD AUTO: 13.9 % (ref 5–12)
MONOCYTES NFR BLD AUTO: 8.6 % (ref 5–12)
MONOCYTES NFR BLD AUTO: 8.9 % (ref 5–12)
MONOCYTES NFR BLD AUTO: 9.7 % (ref 5–12)
MRSA DNA SPEC QL NAA+PROBE: POSITIVE
MYELOCYTES NFR BLD MANUAL: 2 % (ref 0–0)
NEUTROPHILS # BLD AUTO: 10.31 10*3/MM3 (ref 1.7–7)
NEUTROPHILS # BLD AUTO: 4.63 10*3/MM3 (ref 1.7–7)
NEUTROPHILS NFR BLD AUTO: 2.86 10*3/MM3 (ref 1.7–7)
NEUTROPHILS NFR BLD AUTO: 2.86 10*3/MM3 (ref 1.7–7)
NEUTROPHILS NFR BLD AUTO: 2.91 10*3/MM3 (ref 1.7–7)
NEUTROPHILS NFR BLD AUTO: 3.54 10*3/MM3 (ref 1.7–7)
NEUTROPHILS NFR BLD AUTO: 4.58 10*3/MM3 (ref 1.7–7)
NEUTROPHILS NFR BLD AUTO: 56.1 % (ref 42.7–76)
NEUTROPHILS NFR BLD AUTO: 56.5 % (ref 42.7–76)
NEUTROPHILS NFR BLD AUTO: 64.5 % (ref 42.7–76)
NEUTROPHILS NFR BLD AUTO: 67.9 % (ref 42.7–76)
NEUTROPHILS NFR BLD AUTO: 68 % (ref 42.7–76)
NEUTROPHILS NFR BLD MANUAL: 30 % (ref 42.7–76)
NEUTROPHILS NFR BLD MANUAL: 60 % (ref 42.7–76)
NEUTS BAND NFR BLD MANUAL: 20 % (ref 0–5)
NEUTS BAND NFR BLD MANUAL: 40 % (ref 0–5)
NITRITE UR QL STRIP: NEGATIVE
NOTE: ABNORMAL
NOTE: ABNORMAL
NRBC BLD AUTO-RTO: 0 /100 WBC (ref 0–0.2)
NRBC BLD AUTO-RTO: 0.3 /100 WBC (ref 0–0.2)
NRBC BLD AUTO-RTO: 1.8 /100 WBC (ref 0–0.2)
NT-PROBNP SERPL-MCNC: 4031 PG/ML (ref 0–900)
NT-PROBNP SERPL-MCNC: 4354 PG/ML (ref 0–900)
NT-PROBNP SERPL-MCNC: 5110 PG/ML (ref 0–900)
NT-PROBNP SERPL-MCNC: 6864 PG/ML (ref 0–900)
OSMOLALITY SERPL: 305 MOSM/KG (ref 275–295)
OVALOCYTES BLD QL SMEAR: ABNORMAL
OVALOCYTES BLD QL SMEAR: NORMAL
OVALOCYTES BLD QL SMEAR: NORMAL
OXYHGB MFR BLDV: 92.1 % (ref 94–99)
OXYHGB MFR BLDV: 95 % (ref 94–99)
PAW @ PEAK INSP FLOW SETTING VENT: 0 CMH2O
PCO2 BLDA: 34.9 MM HG (ref 35–45)
PCO2 BLDA: 35.5 MM HG (ref 35–45)
PCO2 TEMP ADJ BLD: 34.9 MM HG (ref 35–48)
PCO2 TEMP ADJ BLD: 35.5 MM HG (ref 35–48)
PH BLDA: 7.39 PH UNITS (ref 7.35–7.45)
PH BLDA: 7.46 PH UNITS (ref 7.35–7.45)
PH UR STRIP.AUTO: 7.5 [PH] (ref 5–8)
PH UR STRIP.AUTO: <=5 [PH] (ref 5–8)
PH UR STRIP.AUTO: <=5 [PH] (ref 5–8)
PH, TEMP CORRECTED: 7.39 PH UNITS
PH, TEMP CORRECTED: 7.46 PH UNITS
PHOSPHATE SERPL-MCNC: 1.8 MG/DL (ref 2.5–4.5)
PHOSPHATE SERPL-MCNC: 2 MG/DL (ref 2.5–4.5)
PHOSPHATE SERPL-MCNC: 2.6 MG/DL (ref 2.5–4.5)
PLAT MORPH BLD: NORMAL
PLATELET # BLD AUTO: 192 10*3/MM3 (ref 140–450)
PLATELET # BLD AUTO: 205 10*3/MM3 (ref 140–450)
PLATELET # BLD AUTO: 220 10*3/MM3 (ref 140–450)
PLATELET # BLD AUTO: 231 10*3/MM3 (ref 140–450)
PLATELET # BLD AUTO: 252 10*3/MM3 (ref 140–450)
PLATELET # BLD AUTO: 38 10*3/MM3 (ref 140–450)
PLATELET # BLD AUTO: 45 10*3/MM3 (ref 140–450)
PLATELET # BLD AUTO: 66 10*3/MM3 (ref 140–450)
PMV BLD AUTO: 10 FL (ref 6–12)
PMV BLD AUTO: 10.1 FL (ref 6–12)
PMV BLD AUTO: 10.3 FL (ref 6–12)
PMV BLD AUTO: 10.6 FL (ref 6–12)
PMV BLD AUTO: 11.6 FL (ref 6–12)
PMV BLD AUTO: 13.7 FL (ref 6–12)
PMV BLD AUTO: 9.8 FL (ref 6–12)
PMV BLD AUTO: 9.8 FL (ref 6–12)
PO2 BLDA: 67.5 MM HG (ref 83–108)
PO2 BLDA: 82.2 MM HG (ref 83–108)
PO2 TEMP ADJ BLD: 67.5 MM HG (ref 83–108)
PO2 TEMP ADJ BLD: 82.2 MM HG (ref 83–108)
POTASSIUM SERPL-SCNC: 3.2 MMOL/L (ref 3.5–5.2)
POTASSIUM SERPL-SCNC: 3.7 MMOL/L (ref 3.5–5.2)
POTASSIUM SERPL-SCNC: 3.8 MMOL/L (ref 3.5–5.2)
POTASSIUM SERPL-SCNC: 3.8 MMOL/L (ref 3.5–5.2)
POTASSIUM SERPL-SCNC: 3.9 MMOL/L (ref 3.5–5.2)
POTASSIUM SERPL-SCNC: 3.9 MMOL/L (ref 3.5–5.2)
POTASSIUM SERPL-SCNC: 4 MMOL/L (ref 3.5–5.2)
POTASSIUM SERPL-SCNC: 4.1 MMOL/L (ref 3.5–5.2)
POTASSIUM SERPL-SCNC: 4.2 MMOL/L (ref 3.5–5.2)
POTASSIUM SERPL-SCNC: 4.4 MMOL/L (ref 3.5–5.2)
POTASSIUM SERPL-SCNC: 4.4 MMOL/L (ref 3.5–5.2)
POTASSIUM SERPL-SCNC: 4.5 MMOL/L (ref 3.5–5.2)
PROCALCITONIN SERPL-MCNC: 2.94 NG/ML (ref 0–0.25)
PROT SERPL-MCNC: 6.2 G/DL (ref 6–8.5)
PROT SERPL-MCNC: 6.7 G/DL (ref 6–8.5)
PROT SERPL-MCNC: 7 G/DL (ref 6–8.5)
PROT SERPL-MCNC: 7.6 G/DL (ref 6–8.5)
PROT SERPL-MCNC: 7.6 G/DL (ref 6–8.5)
PROT SERPL-MCNC: 8.2 G/DL (ref 6–8.5)
PROT UR QL STRIP: ABNORMAL
PROT UR QL STRIP: NEGATIVE
PROT UR QL STRIP: NEGATIVE
PROTHROMBIN TIME: 17.1 SECONDS (ref 11.5–14)
QT INTERVAL: 374 MS
QT INTERVAL: 384 MS
QT INTERVAL: 430 MS
QT INTERVAL: 432 MS
QT INTERVAL: 436 MS
QT INTERVAL: 494 MS
QTC INTERVAL: 450 MS
QTC INTERVAL: 495 MS
QTC INTERVAL: 507 MS
QTC INTERVAL: 509 MS
QTC INTERVAL: 514 MS
QTC INTERVAL: 656 MS
RBC # BLD AUTO: 2.94 10*6/MM3 (ref 4.14–5.8)
RBC # BLD AUTO: 2.94 10*6/MM3 (ref 4.14–5.8)
RBC # BLD AUTO: 3.08 10*6/MM3 (ref 4.14–5.8)
RBC # BLD AUTO: 3.16 10*6/MM3 (ref 4.14–5.8)
RBC # BLD AUTO: 3.33 10*6/MM3 (ref 4.14–5.8)
RBC # BLD AUTO: 3.33 10*6/MM3 (ref 4.14–5.8)
RBC # BLD AUTO: 3.5 10*6/MM3 (ref 4.14–5.8)
RBC # BLD AUTO: 3.57 10*6/MM3 (ref 4.14–5.8)
RBC # UR: ABNORMAL /HPF
RBC # UR: NORMAL /HPF
REF LAB TEST METHOD: ABNORMAL
REF LAB TEST METHOD: NORMAL
RHINOVIRUS RNA SPEC NAA+PROBE: NOT DETECTED
RSV RNA NPH QL NAA+NON-PROBE: NOT DETECTED
SARS-COV-2 RNA NPH QL NAA+NON-PROBE: NOT DETECTED
SARS-COV-2 RNA RESP QL NAA+PROBE: NOT DETECTED
SARS-COV-2 RNA RESP QL NAA+PROBE: NOT DETECTED
SCHISTOCYTES BLD QL SMEAR: NORMAL
SMALL PLATELETS BLD QL SMEAR: ABNORMAL
SMALL PLATELETS BLD QL SMEAR: NORMAL
SODIUM SERPL-SCNC: 131 MMOL/L (ref 136–145)
SODIUM SERPL-SCNC: 132 MMOL/L (ref 136–145)
SODIUM SERPL-SCNC: 133 MMOL/L (ref 136–145)
SODIUM SERPL-SCNC: 134 MMOL/L (ref 136–145)
SODIUM SERPL-SCNC: 135 MMOL/L (ref 136–145)
SODIUM SERPL-SCNC: 139 MMOL/L (ref 136–145)
SODIUM SERPL-SCNC: 141 MMOL/L (ref 136–145)
SODIUM SERPL-SCNC: 141 MMOL/L (ref 136–145)
SODIUM SERPL-SCNC: 142 MMOL/L (ref 136–145)
SODIUM SERPL-SCNC: 144 MMOL/L (ref 136–145)
SODIUM SERPL-SCNC: 147 MMOL/L (ref 136–145)
SODIUM SERPL-SCNC: 152 MMOL/L (ref 136–145)
SP GR UR STRIP: 1.01 (ref 1–1.03)
SP GR UR STRIP: 1.02 (ref 1–1.03)
SP GR UR STRIP: 1.02 (ref 1–1.03)
SQUAMOUS #/AREA URNS HPF: ABNORMAL /HPF
SQUAMOUS #/AREA URNS HPF: NORMAL /HPF
STRESS TARGET HR: 129 BPM
T4 FREE SERPL-MCNC: 1.41 NG/DL (ref 0.93–1.7)
TOTAL RATE: 0 BREATHS/MINUTE
TRIGL SERPL-MCNC: 192 MG/DL (ref 0–150)
TROPONIN T SERPL-MCNC: 0.01 NG/ML (ref 0–0.03)
TROPONIN T SERPL-MCNC: 0.01 NG/ML (ref 0–0.03)
TROPONIN T SERPL-MCNC: 0.02 NG/ML (ref 0–0.03)
TROPONIN T SERPL-MCNC: 0.03 NG/ML (ref 0–0.03)
TROPONIN T SERPL-MCNC: 0.05 NG/ML (ref 0–0.03)
TROPONIN T SERPL-MCNC: 0.05 NG/ML (ref 0–0.03)
TSH SERPL DL<=0.05 MIU/L-ACNC: 2.23 UIU/ML (ref 0.27–4.2)
TSH SERPL DL<=0.05 MIU/L-ACNC: 6.25 UIU/ML (ref 0.27–4.2)
UROBILINOGEN UR QL STRIP: ABNORMAL
UROBILINOGEN UR QL STRIP: ABNORMAL
UROBILINOGEN UR QL STRIP: NORMAL
VENTILATOR MODE: ABNORMAL
VLDLC SERPL-MCNC: 31 MG/DL (ref 5–40)
WBC # BLD AUTO: 12.89 10*3/MM3 (ref 3.4–10.8)
WBC # BLD AUTO: 4.29 10*3/MM3 (ref 3.4–10.8)
WBC # BLD AUTO: 4.43 10*3/MM3 (ref 3.4–10.8)
WBC # BLD AUTO: 5.09 10*3/MM3 (ref 3.4–10.8)
WBC # BLD AUTO: 5.41 10*3/MM3 (ref 3.4–10.8)
WBC # BLD AUTO: 6.26 10*3/MM3 (ref 3.4–10.8)
WBC # BLD AUTO: 6.62 10*3/MM3 (ref 3.4–10.8)
WBC # BLD AUTO: 6.74 10*3/MM3 (ref 3.4–10.8)
WBC MORPH BLD: NORMAL
WBC UR QL AUTO: ABNORMAL /HPF
WBC UR QL AUTO: NORMAL /HPF
WHOLE BLOOD HOLD SPECIMEN: NORMAL

## 2021-01-01 PROCEDURE — 63710000001 INSULIN DETEMIR PER 5 UNITS: Performed by: INTERNAL MEDICINE

## 2021-01-01 PROCEDURE — 70450 CT HEAD/BRAIN W/O DYE: CPT

## 2021-01-01 PROCEDURE — 83735 ASSAY OF MAGNESIUM: CPT | Performed by: EMERGENCY MEDICINE

## 2021-01-01 PROCEDURE — 82962 GLUCOSE BLOOD TEST: CPT

## 2021-01-01 PROCEDURE — 63710000001 INSULIN REGULAR HUMAN PER 5 UNITS: Performed by: FAMILY MEDICINE

## 2021-01-01 PROCEDURE — 80048 BASIC METABOLIC PNL TOTAL CA: CPT | Performed by: INTERNAL MEDICINE

## 2021-01-01 PROCEDURE — G0378 HOSPITAL OBSERVATION PER HR: HCPCS

## 2021-01-01 PROCEDURE — 63710000001 INSULIN DETEMIR PER 5 UNITS: Performed by: FAMILY MEDICINE

## 2021-01-01 PROCEDURE — 80053 COMPREHEN METABOLIC PANEL: CPT | Performed by: INTERNAL MEDICINE

## 2021-01-01 PROCEDURE — 83735 ASSAY OF MAGNESIUM: CPT | Performed by: PHYSICIAN ASSISTANT

## 2021-01-01 PROCEDURE — 63710000001 INSULIN REGULAR HUMAN PER 5 UNITS: Performed by: NURSE PRACTITIONER

## 2021-01-01 PROCEDURE — 71045 X-RAY EXAM CHEST 1 VIEW: CPT

## 2021-01-01 PROCEDURE — 80048 BASIC METABOLIC PNL TOTAL CA: CPT | Performed by: NURSE PRACTITIONER

## 2021-01-01 PROCEDURE — 80053 COMPREHEN METABOLIC PANEL: CPT | Performed by: NURSE PRACTITIONER

## 2021-01-01 PROCEDURE — 82805 BLOOD GASES W/O2 SATURATION: CPT

## 2021-01-01 PROCEDURE — 85007 BL SMEAR W/DIFF WBC COUNT: CPT | Performed by: EMERGENCY MEDICINE

## 2021-01-01 PROCEDURE — 87040 BLOOD CULTURE FOR BACTERIA: CPT | Performed by: PHYSICIAN ASSISTANT

## 2021-01-01 PROCEDURE — 83880 ASSAY OF NATRIURETIC PEPTIDE: CPT | Performed by: EMERGENCY MEDICINE

## 2021-01-01 PROCEDURE — 63710000001 INSULIN REGULAR HUMAN PER 5 UNITS: Performed by: INTERNAL MEDICINE

## 2021-01-01 PROCEDURE — 85379 FIBRIN DEGRADATION QUANT: CPT | Performed by: FAMILY MEDICINE

## 2021-01-01 PROCEDURE — 99226 PR SBSQ OBSERVATION CARE/DAY 35 MINUTES: CPT | Performed by: NURSE PRACTITIONER

## 2021-01-01 PROCEDURE — 82375 ASSAY CARBOXYHB QUANT: CPT

## 2021-01-01 PROCEDURE — 85025 COMPLETE CBC W/AUTO DIFF WBC: CPT | Performed by: INTERNAL MEDICINE

## 2021-01-01 PROCEDURE — 25810000003 SODIUM CHLORIDE 0.9 % WITH KCL 20 MEQ 20-0.9 MEQ/L-% SOLUTION: Performed by: PHYSICIAN ASSISTANT

## 2021-01-01 PROCEDURE — 99285 EMERGENCY DEPT VISIT HI MDM: CPT

## 2021-01-01 PROCEDURE — 63710000001 INSULIN LISPRO (HUMAN) PER 5 UNITS: Performed by: INTERNAL MEDICINE

## 2021-01-01 PROCEDURE — 99217 PR OBSERVATION CARE DISCHARGE MANAGEMENT: CPT | Performed by: NURSE PRACTITIONER

## 2021-01-01 PROCEDURE — 85007 BL SMEAR W/DIFF WBC COUNT: CPT | Performed by: INTERNAL MEDICINE

## 2021-01-01 PROCEDURE — 93005 ELECTROCARDIOGRAM TRACING: CPT | Performed by: EMERGENCY MEDICINE

## 2021-01-01 PROCEDURE — 25010000002 MORPHINE PER 10 MG: Performed by: FAMILY MEDICINE

## 2021-01-01 PROCEDURE — 84100 ASSAY OF PHOSPHORUS: CPT | Performed by: INTERNAL MEDICINE

## 2021-01-01 PROCEDURE — 36600 WITHDRAWAL OF ARTERIAL BLOOD: CPT

## 2021-01-01 PROCEDURE — 99220 PR INITIAL OBSERVATION CARE/DAY 70 MINUTES: CPT | Performed by: INTERNAL MEDICINE

## 2021-01-01 PROCEDURE — 84484 ASSAY OF TROPONIN QUANT: CPT | Performed by: EMERGENCY MEDICINE

## 2021-01-01 PROCEDURE — 99238 HOSP IP/OBS DSCHRG MGMT 30/<: CPT | Performed by: FAMILY MEDICINE

## 2021-01-01 PROCEDURE — 97165 OT EVAL LOW COMPLEX 30 MIN: CPT

## 2021-01-01 PROCEDURE — 25010000002 LORAZEPAM PER 2 MG: Performed by: FAMILY MEDICINE

## 2021-01-01 PROCEDURE — 80053 COMPREHEN METABOLIC PANEL: CPT | Performed by: PHYSICIAN ASSISTANT

## 2021-01-01 PROCEDURE — C9803 HOPD COVID-19 SPEC COLLECT: HCPCS

## 2021-01-01 PROCEDURE — 83605 ASSAY OF LACTIC ACID: CPT | Performed by: PHYSICIAN ASSISTANT

## 2021-01-01 PROCEDURE — 99204 OFFICE O/P NEW MOD 45 MIN: CPT | Performed by: INTERNAL MEDICINE

## 2021-01-01 PROCEDURE — 93306 TTE W/DOPPLER COMPLETE: CPT

## 2021-01-01 PROCEDURE — 83880 ASSAY OF NATRIURETIC PEPTIDE: CPT | Performed by: INTERNAL MEDICINE

## 2021-01-01 PROCEDURE — P9612 CATHETERIZE FOR URINE SPEC: HCPCS

## 2021-01-01 PROCEDURE — 80048 BASIC METABOLIC PNL TOTAL CA: CPT | Performed by: PHYSICIAN ASSISTANT

## 2021-01-01 PROCEDURE — 84443 ASSAY THYROID STIM HORMONE: CPT | Performed by: EMERGENCY MEDICINE

## 2021-01-01 PROCEDURE — 81001 URINALYSIS AUTO W/SCOPE: CPT | Performed by: EMERGENCY MEDICINE

## 2021-01-01 PROCEDURE — 93010 ELECTROCARDIOGRAM REPORT: CPT | Performed by: INTERNAL MEDICINE

## 2021-01-01 PROCEDURE — 99225 PR SBSQ OBSERVATION CARE/DAY 25 MINUTES: CPT | Performed by: FAMILY MEDICINE

## 2021-01-01 PROCEDURE — 83735 ASSAY OF MAGNESIUM: CPT | Performed by: INTERNAL MEDICINE

## 2021-01-01 PROCEDURE — 63710000001 INSULIN LISPRO (HUMAN) PER 5 UNITS: Performed by: PHYSICIAN ASSISTANT

## 2021-01-01 PROCEDURE — 0202U NFCT DS 22 TRGT SARS-COV-2: CPT | Performed by: EMERGENCY MEDICINE

## 2021-01-01 PROCEDURE — 87070 CULTURE OTHR SPECIMN AEROBIC: CPT | Performed by: INTERNAL MEDICINE

## 2021-01-01 PROCEDURE — 81003 URINALYSIS AUTO W/O SCOPE: CPT | Performed by: PHYSICIAN ASSISTANT

## 2021-01-01 PROCEDURE — 85025 COMPLETE CBC W/AUTO DIFF WBC: CPT | Performed by: EMERGENCY MEDICINE

## 2021-01-01 PROCEDURE — 84439 ASSAY OF FREE THYROXINE: CPT | Performed by: EMERGENCY MEDICINE

## 2021-01-01 PROCEDURE — 93306 TTE W/DOPPLER COMPLETE: CPT | Performed by: INTERNAL MEDICINE

## 2021-01-01 PROCEDURE — 80053 COMPREHEN METABOLIC PANEL: CPT | Performed by: EMERGENCY MEDICINE

## 2021-01-01 PROCEDURE — 83735 ASSAY OF MAGNESIUM: CPT | Performed by: NURSE PRACTITIONER

## 2021-01-01 PROCEDURE — 84484 ASSAY OF TROPONIN QUANT: CPT | Performed by: PHYSICIAN ASSISTANT

## 2021-01-01 PROCEDURE — 63710000001 INSULIN LISPRO (HUMAN) PER 5 UNITS: Performed by: FAMILY MEDICINE

## 2021-01-01 PROCEDURE — 25010000003 MAGNESIUM SULFATE 4 GM/100ML SOLUTION: Performed by: INTERNAL MEDICINE

## 2021-01-01 PROCEDURE — 63710000001 INSULIN LISPRO (HUMAN) PER 5 UNITS: Performed by: NURSE PRACTITIONER

## 2021-01-01 PROCEDURE — 82010 KETONE BODYS QUAN: CPT | Performed by: EMERGENCY MEDICINE

## 2021-01-01 PROCEDURE — 99214 OFFICE O/P EST MOD 30 MIN: CPT | Performed by: INTERNAL MEDICINE

## 2021-01-01 PROCEDURE — 25010000002 DAPTOMYCIN PER 1 MG

## 2021-01-01 PROCEDURE — 87186 SC STD MICRODIL/AGAR DIL: CPT | Performed by: INTERNAL MEDICINE

## 2021-01-01 PROCEDURE — 99217 PR OBSERVATION CARE DISCHARGE MANAGEMENT: CPT | Performed by: INTERNAL MEDICINE

## 2021-01-01 PROCEDURE — 85025 COMPLETE CBC W/AUTO DIFF WBC: CPT | Performed by: NURSE PRACTITIONER

## 2021-01-01 PROCEDURE — 99223 1ST HOSP IP/OBS HIGH 75: CPT | Performed by: FAMILY MEDICINE

## 2021-01-01 PROCEDURE — 96366 THER/PROPH/DIAG IV INF ADDON: CPT

## 2021-01-01 PROCEDURE — 97161 PT EVAL LOW COMPLEX 20 MIN: CPT

## 2021-01-01 PROCEDURE — 99213 OFFICE O/P EST LOW 20 MIN: CPT | Performed by: NURSE PRACTITIONER

## 2021-01-01 PROCEDURE — 80061 LIPID PANEL: CPT | Performed by: INTERNAL MEDICINE

## 2021-01-01 PROCEDURE — 87086 URINE CULTURE/COLONY COUNT: CPT | Performed by: EMERGENCY MEDICINE

## 2021-01-01 PROCEDURE — 99213 OFFICE O/P EST LOW 20 MIN: CPT | Performed by: INTERNAL MEDICINE

## 2021-01-01 PROCEDURE — 87147 CULTURE TYPE IMMUNOLOGIC: CPT | Performed by: INTERNAL MEDICINE

## 2021-01-01 PROCEDURE — 83880 ASSAY OF NATRIURETIC PEPTIDE: CPT | Performed by: FAMILY MEDICINE

## 2021-01-01 PROCEDURE — 25010000002 VANCOMYCIN 10 G RECONSTITUTED SOLUTION: Performed by: EMERGENCY MEDICINE

## 2021-01-01 PROCEDURE — 93005 ELECTROCARDIOGRAM TRACING: CPT | Performed by: FAMILY MEDICINE

## 2021-01-01 PROCEDURE — 84100 ASSAY OF PHOSPHORUS: CPT | Performed by: PHYSICIAN ASSISTANT

## 2021-01-01 PROCEDURE — 84484 ASSAY OF TROPONIN QUANT: CPT | Performed by: FAMILY MEDICINE

## 2021-01-01 PROCEDURE — 96375 TX/PRO/DX INJ NEW DRUG ADDON: CPT

## 2021-01-01 PROCEDURE — 99226 PR SBSQ OBSERVATION CARE/DAY 35 MINUTES: CPT | Performed by: FAMILY MEDICINE

## 2021-01-01 PROCEDURE — 71275 CT ANGIOGRAPHY CHEST: CPT

## 2021-01-01 PROCEDURE — 25010000002 MORPHINE PER 10 MG: Performed by: NURSE PRACTITIONER

## 2021-01-01 PROCEDURE — 83605 ASSAY OF LACTIC ACID: CPT | Performed by: INTERNAL MEDICINE

## 2021-01-01 PROCEDURE — 87186 SC STD MICRODIL/AGAR DIL: CPT | Performed by: EMERGENCY MEDICINE

## 2021-01-01 PROCEDURE — 85027 COMPLETE CBC AUTOMATED: CPT | Performed by: PHYSICIAN ASSISTANT

## 2021-01-01 PROCEDURE — 85025 COMPLETE CBC W/AUTO DIFF WBC: CPT | Performed by: PHYSICIAN ASSISTANT

## 2021-01-01 PROCEDURE — 87641 MR-STAPH DNA AMP PROBE: CPT | Performed by: FAMILY MEDICINE

## 2021-01-01 PROCEDURE — G0108 DIAB MANAGE TRN  PER INDIV: HCPCS

## 2021-01-01 PROCEDURE — 99233 SBSQ HOSP IP/OBS HIGH 50: CPT | Performed by: INTERNAL MEDICINE

## 2021-01-01 PROCEDURE — 0 IOPAMIDOL PER 1 ML: Performed by: FAMILY MEDICINE

## 2021-01-01 PROCEDURE — 83036 HEMOGLOBIN GLYCOSYLATED A1C: CPT | Performed by: PHYSICIAN ASSISTANT

## 2021-01-01 PROCEDURE — 87040 BLOOD CULTURE FOR BACTERIA: CPT | Performed by: EMERGENCY MEDICINE

## 2021-01-01 PROCEDURE — 87205 SMEAR GRAM STAIN: CPT | Performed by: INTERNAL MEDICINE

## 2021-01-01 PROCEDURE — 83605 ASSAY OF LACTIC ACID: CPT | Performed by: EMERGENCY MEDICINE

## 2021-01-01 PROCEDURE — 83050 HGB METHEMOGLOBIN QUAN: CPT

## 2021-01-01 PROCEDURE — 85007 BL SMEAR W/DIFF WBC COUNT: CPT | Performed by: NURSE PRACTITIONER

## 2021-01-01 PROCEDURE — 97162 PT EVAL MOD COMPLEX 30 MIN: CPT

## 2021-01-01 PROCEDURE — 96374 THER/PROPH/DIAG INJ IV PUSH: CPT

## 2021-01-01 PROCEDURE — 25010000002 DAPTOMYCIN PER 1 MG: Performed by: INTERNAL MEDICINE

## 2021-01-01 PROCEDURE — 87150 DNA/RNA AMPLIFIED PROBE: CPT | Performed by: EMERGENCY MEDICINE

## 2021-01-01 PROCEDURE — 96368 THER/DIAG CONCURRENT INF: CPT

## 2021-01-01 PROCEDURE — 70486 CT MAXILLOFACIAL W/O DYE: CPT

## 2021-01-01 PROCEDURE — 93005 ELECTROCARDIOGRAM TRACING: CPT | Performed by: PHYSICIAN ASSISTANT

## 2021-01-01 PROCEDURE — 97110 THERAPEUTIC EXERCISES: CPT

## 2021-01-01 PROCEDURE — 84145 PROCALCITONIN (PCT): CPT | Performed by: INTERNAL MEDICINE

## 2021-01-01 PROCEDURE — 87147 CULTURE TYPE IMMUNOLOGIC: CPT | Performed by: EMERGENCY MEDICINE

## 2021-01-01 PROCEDURE — 63710000001 INSULIN DETEMIR PER 5 UNITS: Performed by: PHYSICIAN ASSISTANT

## 2021-01-01 PROCEDURE — 25010000002 PIPERACILLIN SOD-TAZOBACTAM PER 1 G: Performed by: FAMILY MEDICINE

## 2021-01-01 PROCEDURE — 87636 SARSCOV2 & INF A&B AMP PRB: CPT | Performed by: PHYSICIAN ASSISTANT

## 2021-01-01 PROCEDURE — 83930 ASSAY OF BLOOD OSMOLALITY: CPT | Performed by: PHYSICIAN ASSISTANT

## 2021-01-01 PROCEDURE — 25010000002 PIPERACILLIN SOD-TAZOBACTAM PER 1 G: Performed by: PHYSICIAN ASSISTANT

## 2021-01-01 PROCEDURE — 83690 ASSAY OF LIPASE: CPT | Performed by: EMERGENCY MEDICINE

## 2021-01-01 PROCEDURE — 0 IOPAMIDOL PER 1 ML: Performed by: INTERNAL MEDICINE

## 2021-01-01 PROCEDURE — 96365 THER/PROPH/DIAG IV INF INIT: CPT

## 2021-01-01 PROCEDURE — 97530 THERAPEUTIC ACTIVITIES: CPT

## 2021-01-01 PROCEDURE — 63710000001 INSULIN REGULAR HUMAN PER 5 UNITS: Performed by: EMERGENCY MEDICINE

## 2021-01-01 PROCEDURE — 25010000002 ONDANSETRON PER 1 MG: Performed by: EMERGENCY MEDICINE

## 2021-01-01 PROCEDURE — 82533 TOTAL CORTISOL: CPT | Performed by: PHYSICIAN ASSISTANT

## 2021-01-01 PROCEDURE — 85610 PROTHROMBIN TIME: CPT | Performed by: EMERGENCY MEDICINE

## 2021-01-01 PROCEDURE — 82550 ASSAY OF CK (CPK): CPT | Performed by: EMERGENCY MEDICINE

## 2021-01-01 PROCEDURE — 99226 PR SBSQ OBSERVATION CARE/DAY 35 MINUTES: CPT | Performed by: INTERNAL MEDICINE

## 2021-01-01 PROCEDURE — 25010000002 PIPERACILLIN SOD-TAZOBACTAM PER 1 G: Performed by: EMERGENCY MEDICINE

## 2021-01-01 PROCEDURE — 71250 CT THORAX DX C-: CPT

## 2021-01-01 PROCEDURE — 25010000002 VANCOMYCIN 10 G RECONSTITUTED SOLUTION: Performed by: PHYSICIAN ASSISTANT

## 2021-01-01 RX ORDER — PANTOPRAZOLE SODIUM 40 MG/1
40 TABLET, DELAYED RELEASE ORAL DAILY
COMMUNITY

## 2021-01-01 RX ORDER — CARVEDILOL 12.5 MG/1
12.5 TABLET ORAL 2 TIMES DAILY WITH MEALS
Status: DISCONTINUED | OUTPATIENT
Start: 2021-01-01 | End: 2021-01-01 | Stop reason: HOSPADM

## 2021-01-01 RX ORDER — ACETAMINOPHEN 325 MG/1
650 TABLET ORAL EVERY 4 HOURS PRN
Status: DISCONTINUED | OUTPATIENT
Start: 2021-01-01 | End: 2021-01-01 | Stop reason: HOSPADM

## 2021-01-01 RX ORDER — DEXTROSE MONOHYDRATE 25 G/50ML
25 INJECTION, SOLUTION INTRAVENOUS
Status: DISCONTINUED | OUTPATIENT
Start: 2021-01-01 | End: 2021-01-01

## 2021-01-01 RX ORDER — CHOLECALCIFEROL (VITAMIN D3) 125 MCG
5 CAPSULE ORAL NIGHTLY PRN
Status: DISCONTINUED | OUTPATIENT
Start: 2021-01-01 | End: 2021-01-01 | Stop reason: HOSPADM

## 2021-01-01 RX ORDER — LORAZEPAM 2 MG/ML
0.5 INJECTION INTRAMUSCULAR EVERY 4 HOURS PRN
Status: DISCONTINUED | OUTPATIENT
Start: 2021-01-01 | End: 2021-01-01

## 2021-01-01 RX ORDER — NITROGLYCERIN 0.4 MG/1
0.4 TABLET SUBLINGUAL
Status: DISCONTINUED | OUTPATIENT
Start: 2021-01-01 | End: 2021-01-01 | Stop reason: HOSPADM

## 2021-01-01 RX ORDER — LORAZEPAM 2 MG/ML
0.5 INJECTION INTRAMUSCULAR
Status: DISCONTINUED | OUTPATIENT
Start: 2021-01-01 | End: 2021-01-01 | Stop reason: HOSPADM

## 2021-01-01 RX ORDER — INSULIN HUMAN 500 [IU]/ML
INJECTION, SOLUTION SUBCUTANEOUS
Start: 2021-01-01 | End: 2021-01-01 | Stop reason: HOSPADM

## 2021-01-01 RX ORDER — SODIUM CHLORIDE 0.9 % (FLUSH) 0.9 %
10 SYRINGE (ML) INJECTION AS NEEDED
Status: CANCELLED | OUTPATIENT
Start: 2021-01-01

## 2021-01-01 RX ORDER — SODIUM CHLORIDE 0.9 % (FLUSH) 0.9 %
10 SYRINGE (ML) INJECTION EVERY 12 HOURS SCHEDULED
Status: CANCELLED | OUTPATIENT
Start: 2021-01-01

## 2021-01-01 RX ORDER — ONDANSETRON 2 MG/ML
4 INJECTION INTRAMUSCULAR; INTRAVENOUS EVERY 6 HOURS PRN
Status: DISCONTINUED | OUTPATIENT
Start: 2021-01-01 | End: 2021-01-01 | Stop reason: HOSPADM

## 2021-01-01 RX ORDER — PANTOPRAZOLE SODIUM 40 MG/1
40 TABLET, DELAYED RELEASE ORAL DAILY
Status: DISCONTINUED | OUTPATIENT
Start: 2021-01-01 | End: 2021-01-01 | Stop reason: HOSPADM

## 2021-01-01 RX ORDER — DEXTROSE, SODIUM CHLORIDE, AND POTASSIUM CHLORIDE 5; .45; .15 G/100ML; G/100ML; G/100ML
150 INJECTION INTRAVENOUS CONTINUOUS PRN
Status: DISCONTINUED | OUTPATIENT
Start: 2021-01-01 | End: 2021-01-01

## 2021-01-01 RX ORDER — ACETAMINOPHEN 650 MG/1
650 SUPPOSITORY RECTAL EVERY 4 HOURS PRN
Status: DISCONTINUED | OUTPATIENT
Start: 2021-01-01 | End: 2021-01-01 | Stop reason: HOSPADM

## 2021-01-01 RX ORDER — SODIUM CHLORIDE 0.9 % (FLUSH) 0.9 %
10 SYRINGE (ML) INJECTION EVERY 12 HOURS SCHEDULED
Status: DISCONTINUED | OUTPATIENT
Start: 2021-01-01 | End: 2021-03-05 | Stop reason: HOSPADM

## 2021-01-01 RX ORDER — ACETAMINOPHEN 160 MG/5ML
650 SOLUTION ORAL EVERY 4 HOURS PRN
Status: DISCONTINUED | OUTPATIENT
Start: 2021-01-01 | End: 2021-01-01

## 2021-01-01 RX ORDER — SODIUM CHLORIDE 9 MG/ML
1000 INJECTION, SOLUTION INTRAVENOUS CONTINUOUS
Status: DISCONTINUED | OUTPATIENT
Start: 2021-01-01 | End: 2021-01-01

## 2021-01-01 RX ORDER — FUROSEMIDE 20 MG/1
20 TABLET ORAL DAILY
Status: DISCONTINUED | OUTPATIENT
Start: 2021-01-01 | End: 2021-01-01 | Stop reason: HOSPADM

## 2021-01-01 RX ORDER — DULOXETIN HYDROCHLORIDE 60 MG/1
60 CAPSULE, DELAYED RELEASE ORAL DAILY
COMMUNITY

## 2021-01-01 RX ORDER — GLYCOPYRROLATE 0.2 MG/ML
0.2 INJECTION INTRAMUSCULAR; INTRAVENOUS EVERY 4 HOURS PRN
Status: DISCONTINUED | OUTPATIENT
Start: 2021-01-01 | End: 2021-03-05 | Stop reason: HOSPADM

## 2021-01-01 RX ORDER — GABAPENTIN 400 MG/1
400 CAPSULE ORAL 3 TIMES DAILY
COMMUNITY

## 2021-01-01 RX ORDER — ACETAMINOPHEN 325 MG/1
650 TABLET ORAL EVERY 4 HOURS PRN
Status: CANCELLED | OUTPATIENT
Start: 2021-01-01

## 2021-01-01 RX ORDER — CARVEDILOL 6.25 MG/1
6.25 TABLET ORAL DAILY
Status: DISCONTINUED | OUTPATIENT
Start: 2021-01-01 | End: 2021-01-01 | Stop reason: HOSPADM

## 2021-01-01 RX ORDER — KETOROLAC TROMETHAMINE 15 MG/ML
15 INJECTION, SOLUTION INTRAMUSCULAR; INTRAVENOUS EVERY 6 HOURS PRN
Status: DISCONTINUED | OUTPATIENT
Start: 2021-01-01 | End: 2021-03-05 | Stop reason: HOSPADM

## 2021-01-01 RX ORDER — ACETAMINOPHEN 160 MG/5ML
650 SOLUTION ORAL EVERY 4 HOURS PRN
Status: CANCELLED | OUTPATIENT
Start: 2021-01-01

## 2021-01-01 RX ORDER — SODIUM CHLORIDE 0.9 % (FLUSH) 0.9 %
10 SYRINGE (ML) INJECTION EVERY 12 HOURS SCHEDULED
Status: DISCONTINUED | OUTPATIENT
Start: 2021-01-01 | End: 2021-01-01 | Stop reason: HOSPADM

## 2021-01-01 RX ORDER — SODIUM CHLORIDE 0.9 % (FLUSH) 0.9 %
10 SYRINGE (ML) INJECTION ONCE AS NEEDED
Status: DISCONTINUED | OUTPATIENT
Start: 2021-01-01 | End: 2021-01-01

## 2021-01-01 RX ORDER — ACETAMINOPHEN 650 MG/1
650 SUPPOSITORY RECTAL EVERY 4 HOURS PRN
Status: CANCELLED | OUTPATIENT
Start: 2021-01-01

## 2021-01-01 RX ORDER — OXYCODONE HYDROCHLORIDE 15 MG/1
15 TABLET ORAL EVERY 4 HOURS PRN
Status: DISCONTINUED | OUTPATIENT
Start: 2021-01-01 | End: 2021-01-01 | Stop reason: HOSPADM

## 2021-01-01 RX ORDER — NICOTINE POLACRILEX 4 MG
15 LOZENGE BUCCAL
Status: DISCONTINUED | OUTPATIENT
Start: 2021-01-01 | End: 2021-01-01 | Stop reason: HOSPADM

## 2021-01-01 RX ORDER — MAGNESIUM SULFATE HEPTAHYDRATE 40 MG/ML
4 INJECTION, SOLUTION INTRAVENOUS AS NEEDED
Status: DISCONTINUED | OUTPATIENT
Start: 2021-01-01 | End: 2021-01-01 | Stop reason: HOSPADM

## 2021-01-01 RX ORDER — ACETAMINOPHEN 160 MG/5ML
650 SOLUTION ORAL EVERY 4 HOURS PRN
Status: DISCONTINUED | OUTPATIENT
Start: 2021-01-01 | End: 2021-01-01 | Stop reason: HOSPADM

## 2021-01-01 RX ORDER — ISOSORBIDE MONONITRATE 60 MG/1
60 TABLET, EXTENDED RELEASE ORAL DAILY
Status: DISCONTINUED | OUTPATIENT
Start: 2021-01-01 | End: 2021-01-01 | Stop reason: HOSPADM

## 2021-01-01 RX ORDER — SODIUM CHLORIDE 9 MG/ML
100 INJECTION, SOLUTION INTRAVENOUS CONTINUOUS
Status: ACTIVE | OUTPATIENT
Start: 2021-01-01 | End: 2021-01-01

## 2021-01-01 RX ORDER — SODIUM CHLORIDE 0.9 % (FLUSH) 0.9 %
3 SYRINGE (ML) INJECTION EVERY 12 HOURS SCHEDULED
Status: DISCONTINUED | OUTPATIENT
Start: 2021-01-01 | End: 2021-01-01

## 2021-01-01 RX ORDER — OXYCODONE HYDROCHLORIDE 15 MG/1
15 TABLET ORAL EVERY 4 HOURS PRN
COMMUNITY

## 2021-01-01 RX ORDER — MULTIPLE VITAMINS W/ MINERALS TAB 9MG-400MCG
1 TAB ORAL DAILY
COMMUNITY

## 2021-01-01 RX ORDER — SODIUM CHLORIDE AND POTASSIUM CHLORIDE 300; 900 MG/100ML; MG/100ML
250 INJECTION, SOLUTION INTRAVENOUS CONTINUOUS PRN
Status: DISCONTINUED | OUTPATIENT
Start: 2021-01-01 | End: 2021-01-01

## 2021-01-01 RX ORDER — CASTOR OIL AND BALSAM, PERU 788; 87 MG/G; MG/G
OINTMENT TOPICAL EVERY 12 HOURS SCHEDULED
Status: DISCONTINUED | OUTPATIENT
Start: 2021-01-01 | End: 2021-03-05 | Stop reason: HOSPADM

## 2021-01-01 RX ORDER — SODIUM CHLORIDE 0.9 % (FLUSH) 0.9 %
10 SYRINGE (ML) INJECTION AS NEEDED
Status: DISCONTINUED | OUTPATIENT
Start: 2021-01-01 | End: 2021-01-01

## 2021-01-01 RX ORDER — SODIUM CHLORIDE 9 MG/ML
250 INJECTION, SOLUTION INTRAVENOUS CONTINUOUS PRN
Status: DISCONTINUED | OUTPATIENT
Start: 2021-01-01 | End: 2021-01-01

## 2021-01-01 RX ORDER — POLYMYXIN B SULFATE AND TRIMETHOPRIM 1; 10000 MG/ML; [USP'U]/ML
1 SOLUTION OPHTHALMIC
Status: DISCONTINUED | OUTPATIENT
Start: 2021-01-01 | End: 2021-01-01 | Stop reason: HOSPADM

## 2021-01-01 RX ORDER — NICOTINE POLACRILEX 4 MG
15 LOZENGE BUCCAL
Status: DISCONTINUED | OUTPATIENT
Start: 2021-01-01 | End: 2021-01-01

## 2021-01-01 RX ORDER — ASPIRIN 81 MG/1
324 TABLET, CHEWABLE ORAL ONCE
Status: DISCONTINUED | OUTPATIENT
Start: 2021-01-01 | End: 2021-01-01

## 2021-01-01 RX ORDER — DEXTROSE MONOHYDRATE 25 G/50ML
25 INJECTION, SOLUTION INTRAVENOUS
Status: CANCELLED | OUTPATIENT
Start: 2021-01-01

## 2021-01-01 RX ORDER — SODIUM CHLORIDE 0.9 % (FLUSH) 0.9 %
10 SYRINGE (ML) INJECTION AS NEEDED
Status: DISCONTINUED | OUTPATIENT
Start: 2021-01-01 | End: 2021-01-01 | Stop reason: HOSPADM

## 2021-01-01 RX ORDER — ERYTHROMYCIN 5 MG/G
OINTMENT OPHTHALMIC EVERY 12 HOURS
Status: DISCONTINUED | OUTPATIENT
Start: 2021-01-01 | End: 2021-01-01 | Stop reason: HOSPADM

## 2021-01-01 RX ORDER — DOCUSATE SODIUM 100 MG/1
100 CAPSULE, LIQUID FILLED ORAL 2 TIMES DAILY
Status: DISCONTINUED | OUTPATIENT
Start: 2021-01-01 | End: 2021-01-01 | Stop reason: HOSPADM

## 2021-01-01 RX ORDER — FUROSEMIDE 10 MG/ML
20 INJECTION INTRAMUSCULAR; INTRAVENOUS EVERY 6 HOURS PRN
Status: DISCONTINUED | OUTPATIENT
Start: 2021-01-01 | End: 2021-03-05 | Stop reason: HOSPADM

## 2021-01-01 RX ORDER — SODIUM CHLORIDE 9 MG/ML
100 INJECTION, SOLUTION INTRAVENOUS CONTINUOUS
Status: DISCONTINUED | OUTPATIENT
Start: 2021-01-01 | End: 2021-01-01

## 2021-01-01 RX ORDER — SODIUM CHLORIDE 9 MG/ML
50 INJECTION, SOLUTION INTRAVENOUS CONTINUOUS
Status: DISCONTINUED | OUTPATIENT
Start: 2021-01-01 | End: 2021-01-01

## 2021-01-01 RX ORDER — MORPHINE SULFATE 4 MG/ML
4 INJECTION, SOLUTION INTRAMUSCULAR; INTRAVENOUS
Status: DISCONTINUED | OUTPATIENT
Start: 2021-01-01 | End: 2021-03-05 | Stop reason: HOSPADM

## 2021-01-01 RX ORDER — GLYCOPYRROLATE 0.2 MG/ML
0.2 INJECTION INTRAMUSCULAR; INTRAVENOUS EVERY 4 HOURS PRN
Status: DISCONTINUED | OUTPATIENT
Start: 2021-01-01 | End: 2021-01-01 | Stop reason: HOSPADM

## 2021-01-01 RX ORDER — POTASSIUM CHLORIDE 1.5 G/1.77G
40 POWDER, FOR SOLUTION ORAL AS NEEDED
Status: DISCONTINUED | OUTPATIENT
Start: 2021-01-01 | End: 2021-01-01 | Stop reason: HOSPADM

## 2021-01-01 RX ORDER — SODIUM CHLORIDE 9 MG/ML
10 INJECTION, SOLUTION INTRAVENOUS CONTINUOUS PRN
Status: DISCONTINUED | OUTPATIENT
Start: 2021-01-01 | End: 2021-01-01

## 2021-01-01 RX ORDER — ACETAMINOPHEN 325 MG/1
650 TABLET ORAL EVERY 4 HOURS PRN
Status: DISCONTINUED | OUTPATIENT
Start: 2021-01-01 | End: 2021-01-01

## 2021-01-01 RX ORDER — ASPIRIN 81 MG/1
81 TABLET ORAL DAILY
Status: DISCONTINUED | OUTPATIENT
Start: 2021-01-01 | End: 2021-01-01 | Stop reason: HOSPADM

## 2021-01-01 RX ORDER — ISOSORBIDE MONONITRATE 60 MG/1
60 TABLET, EXTENDED RELEASE ORAL DAILY
COMMUNITY

## 2021-01-01 RX ORDER — DEXTROSE MONOHYDRATE 25 G/50ML
25 INJECTION, SOLUTION INTRAVENOUS
Status: DISCONTINUED | OUTPATIENT
Start: 2021-01-01 | End: 2021-01-01 | Stop reason: HOSPADM

## 2021-01-01 RX ORDER — DULOXETIN HYDROCHLORIDE 60 MG/1
60 CAPSULE, DELAYED RELEASE ORAL DAILY
Status: DISCONTINUED | OUTPATIENT
Start: 2021-01-01 | End: 2021-01-01 | Stop reason: HOSPADM

## 2021-01-01 RX ORDER — MORPHINE SULFATE 4 MG/ML
4 INJECTION, SOLUTION INTRAMUSCULAR; INTRAVENOUS
Status: DISCONTINUED | OUTPATIENT
Start: 2021-01-01 | End: 2021-01-01 | Stop reason: HOSPADM

## 2021-01-01 RX ORDER — GLYCOPYRROLATE 0.2 MG/ML
0.2 INJECTION INTRAMUSCULAR; INTRAVENOUS EVERY 4 HOURS PRN
Status: CANCELLED | OUTPATIENT
Start: 2021-01-01

## 2021-01-01 RX ORDER — ONDANSETRON 2 MG/ML
4 INJECTION INTRAMUSCULAR; INTRAVENOUS EVERY 6 HOURS PRN
Status: CANCELLED | OUTPATIENT
Start: 2021-01-01

## 2021-01-01 RX ORDER — DEXTROSE MONOHYDRATE 25 G/50ML
25 INJECTION, SOLUTION INTRAVENOUS ONCE
Status: COMPLETED | OUTPATIENT
Start: 2021-01-01 | End: 2021-01-01

## 2021-01-01 RX ORDER — GLYCOPYRROLATE 0.2 MG/ML
0.2 INJECTION INTRAMUSCULAR; INTRAVENOUS EVERY 6 HOURS PRN
Status: DISCONTINUED | OUTPATIENT
Start: 2021-01-01 | End: 2021-01-01 | Stop reason: DRUGHIGH

## 2021-01-01 RX ORDER — DEXTROSE AND SODIUM CHLORIDE 5; .45 G/100ML; G/100ML
150 INJECTION, SOLUTION INTRAVENOUS CONTINUOUS PRN
Status: DISCONTINUED | OUTPATIENT
Start: 2021-01-01 | End: 2021-01-01

## 2021-01-01 RX ORDER — DOCUSATE SODIUM 100 MG/1
100 CAPSULE, LIQUID FILLED ORAL 2 TIMES DAILY
Status: DISCONTINUED | OUTPATIENT
Start: 2021-01-01 | End: 2021-01-01

## 2021-01-01 RX ORDER — LORAZEPAM 2 MG/ML
0.5 INJECTION INTRAMUSCULAR
Status: DISCONTINUED | OUTPATIENT
Start: 2021-01-01 | End: 2021-01-01 | Stop reason: SDUPTHER

## 2021-01-01 RX ORDER — CASTOR OIL AND BALSAM, PERU 788; 87 MG/G; MG/G
OINTMENT TOPICAL EVERY 12 HOURS SCHEDULED
Status: DISCONTINUED | OUTPATIENT
Start: 2021-01-01 | End: 2021-01-01 | Stop reason: HOSPADM

## 2021-01-01 RX ORDER — CARVEDILOL 6.25 MG/1
6.25 TABLET ORAL ONCE
Status: DISCONTINUED | OUTPATIENT
Start: 2021-01-01 | End: 2021-01-01 | Stop reason: HOSPADM

## 2021-01-01 RX ORDER — MORPHINE SULFATE 4 MG/ML
4 INJECTION, SOLUTION INTRAMUSCULAR; INTRAVENOUS EVERY 4 HOURS
Status: DISCONTINUED | OUTPATIENT
Start: 2021-01-01 | End: 2021-03-05 | Stop reason: HOSPADM

## 2021-01-01 RX ORDER — GABAPENTIN 400 MG/1
400 CAPSULE ORAL 3 TIMES DAILY
Status: DISCONTINUED | OUTPATIENT
Start: 2021-01-01 | End: 2021-01-01 | Stop reason: SDUPTHER

## 2021-01-01 RX ORDER — ACETAMINOPHEN 650 MG/1
650 SUPPOSITORY RECTAL EVERY 4 HOURS PRN
Status: DISCONTINUED | OUTPATIENT
Start: 2021-01-01 | End: 2021-01-01

## 2021-01-01 RX ORDER — DEXTROSE MONOHYDRATE 25 G/50ML
25-50 INJECTION, SOLUTION INTRAVENOUS
Status: DISCONTINUED | OUTPATIENT
Start: 2021-01-01 | End: 2021-01-01

## 2021-01-01 RX ORDER — GABAPENTIN 400 MG/1
400 CAPSULE ORAL 3 TIMES DAILY
Status: DISCONTINUED | OUTPATIENT
Start: 2021-01-01 | End: 2021-01-01 | Stop reason: HOSPADM

## 2021-01-01 RX ORDER — CARVEDILOL 6.25 MG/1
6.25 TABLET ORAL 2 TIMES DAILY WITH MEALS
COMMUNITY
End: 2021-01-01 | Stop reason: HOSPADM

## 2021-01-01 RX ORDER — MAGNESIUM SULFATE HEPTAHYDRATE 40 MG/ML
2 INJECTION, SOLUTION INTRAVENOUS AS NEEDED
Status: DISCONTINUED | OUTPATIENT
Start: 2021-01-01 | End: 2021-01-01 | Stop reason: HOSPADM

## 2021-01-01 RX ORDER — DEXTROSE AND SODIUM CHLORIDE 5; .9 G/100ML; G/100ML
150 INJECTION, SOLUTION INTRAVENOUS CONTINUOUS PRN
Status: DISCONTINUED | OUTPATIENT
Start: 2021-01-01 | End: 2021-01-01

## 2021-01-01 RX ORDER — PRAVASTATIN SODIUM 40 MG
40 TABLET ORAL DAILY
Status: DISCONTINUED | OUTPATIENT
Start: 2021-01-01 | End: 2021-01-01 | Stop reason: HOSPADM

## 2021-01-01 RX ORDER — LORAZEPAM 2 MG/ML
0.5 INJECTION INTRAMUSCULAR
Status: CANCELLED | OUTPATIENT
Start: 2021-01-01

## 2021-01-01 RX ORDER — SODIUM CHLORIDE 450 MG/100ML
250 INJECTION, SOLUTION INTRAVENOUS CONTINUOUS PRN
Status: DISCONTINUED | OUTPATIENT
Start: 2021-01-01 | End: 2021-01-01

## 2021-01-01 RX ORDER — POTASSIUM CHLORIDE 750 MG/1
40 CAPSULE, EXTENDED RELEASE ORAL AS NEEDED
Status: DISCONTINUED | OUTPATIENT
Start: 2021-01-01 | End: 2021-01-01 | Stop reason: HOSPADM

## 2021-01-01 RX ORDER — CHOLECALCIFEROL (VITAMIN D3) 125 MCG
5 CAPSULE ORAL ONCE
Status: COMPLETED | OUTPATIENT
Start: 2021-01-01 | End: 2021-01-01

## 2021-01-01 RX ORDER — SODIUM CHLORIDE AND POTASSIUM CHLORIDE 150; 900 MG/100ML; MG/100ML
125 INJECTION, SOLUTION INTRAVENOUS CONTINUOUS
Status: DISCONTINUED | OUTPATIENT
Start: 2021-01-01 | End: 2021-01-01

## 2021-01-01 RX ORDER — DEXTROSE, SODIUM CHLORIDE, AND POTASSIUM CHLORIDE 5; .9; .15 G/100ML; G/100ML; G/100ML
150 INJECTION INTRAVENOUS CONTINUOUS PRN
Status: DISCONTINUED | OUTPATIENT
Start: 2021-01-01 | End: 2021-01-01

## 2021-01-01 RX ORDER — RANOLAZINE 500 MG/1
500 TABLET, EXTENDED RELEASE ORAL EVERY 12 HOURS SCHEDULED
Status: DISCONTINUED | OUTPATIENT
Start: 2021-01-01 | End: 2021-01-01 | Stop reason: HOSPADM

## 2021-01-01 RX ORDER — CARVEDILOL 6.25 MG/1
6.25 TABLET ORAL 2 TIMES DAILY WITH MEALS
Status: DISCONTINUED | OUTPATIENT
Start: 2021-01-01 | End: 2021-01-01

## 2021-01-01 RX ORDER — POTASSIUM CHLORIDE 7.45 MG/ML
10 INJECTION INTRAVENOUS
Status: DISCONTINUED | OUTPATIENT
Start: 2021-01-01 | End: 2021-01-01 | Stop reason: HOSPADM

## 2021-01-01 RX ORDER — ACETAMINOPHEN 650 MG/1
650 SUPPOSITORY RECTAL EVERY 4 HOURS PRN
Status: DISCONTINUED | OUTPATIENT
Start: 2021-03-05 | End: 2021-03-05 | Stop reason: HOSPADM

## 2021-01-01 RX ORDER — CHOLECALCIFEROL (VITAMIN D3) 125 MCG
5 CAPSULE ORAL NIGHTLY PRN
Status: CANCELLED | OUTPATIENT
Start: 2021-01-01

## 2021-01-01 RX ORDER — ERYTHROMYCIN 5 MG/G
OINTMENT OPHTHALMIC EVERY 12 HOURS
Status: CANCELLED | OUTPATIENT
Start: 2021-01-01

## 2021-01-01 RX ORDER — PRAVASTATIN SODIUM 40 MG
40 TABLET ORAL DAILY
COMMUNITY

## 2021-01-01 RX ORDER — BISACODYL 5 MG/1
5 TABLET, DELAYED RELEASE ORAL DAILY PRN
Status: DISCONTINUED | OUTPATIENT
Start: 2021-01-01 | End: 2021-01-01 | Stop reason: HOSPADM

## 2021-01-01 RX ORDER — ASPIRIN 81 MG/1
81 TABLET ORAL DAILY
Qty: 30 TABLET | Refills: 0 | Status: SHIPPED | OUTPATIENT
Start: 2021-01-01

## 2021-01-01 RX ORDER — DOCUSATE SODIUM 100 MG/1
100 CAPSULE, LIQUID FILLED ORAL 2 TIMES DAILY
Status: CANCELLED | OUTPATIENT
Start: 2021-01-01

## 2021-01-01 RX ORDER — SCOLOPAMINE TRANSDERMAL SYSTEM 1 MG/1
1 PATCH, EXTENDED RELEASE TRANSDERMAL
Status: DISCONTINUED | OUTPATIENT
Start: 2021-01-01 | End: 2021-03-05 | Stop reason: HOSPADM

## 2021-01-01 RX ORDER — CASTOR OIL AND BALSAM, PERU 788; 87 MG/G; MG/G
OINTMENT TOPICAL EVERY 12 HOURS SCHEDULED
Status: CANCELLED | OUTPATIENT
Start: 2021-01-01

## 2021-01-01 RX ORDER — ISOSORBIDE MONONITRATE 60 MG/1
60 TABLET, EXTENDED RELEASE ORAL
Status: DISCONTINUED | OUTPATIENT
Start: 2021-01-01 | End: 2021-01-01

## 2021-01-01 RX ORDER — DEXTROSE MONOHYDRATE 25 G/50ML
12.5 INJECTION, SOLUTION INTRAVENOUS AS NEEDED
Status: DISCONTINUED | OUTPATIENT
Start: 2021-01-01 | End: 2021-01-01

## 2021-01-01 RX ORDER — BISACODYL 5 MG/1
5 TABLET, DELAYED RELEASE ORAL DAILY PRN
Status: CANCELLED | OUTPATIENT
Start: 2021-01-01

## 2021-01-01 RX ORDER — OXYCODONE 13.5 MG/1
13.5 CAPSULE, EXTENDED RELEASE ORAL 2 TIMES DAILY
COMMUNITY
End: 2021-01-01 | Stop reason: HOSPADM

## 2021-01-01 RX ORDER — RANOLAZINE 500 MG/1
500 TABLET, EXTENDED RELEASE ORAL EVERY 12 HOURS SCHEDULED
Qty: 30 TABLET | Refills: 0 | Status: SHIPPED | OUTPATIENT
Start: 2021-01-01

## 2021-01-01 RX ORDER — ERYTHROMYCIN 5 MG/G
OINTMENT OPHTHALMIC EVERY 12 HOURS
Status: DISCONTINUED | OUTPATIENT
Start: 2021-01-01 | End: 2021-03-05 | Stop reason: HOSPADM

## 2021-01-01 RX ORDER — POTASSIUM CHLORIDE 750 MG/1
20 CAPSULE, EXTENDED RELEASE ORAL ONCE
Status: COMPLETED | OUTPATIENT
Start: 2021-01-01 | End: 2021-01-01

## 2021-01-01 RX ORDER — SODIUM CHLORIDE AND POTASSIUM CHLORIDE 150; 450 MG/100ML; MG/100ML
250 INJECTION, SOLUTION INTRAVENOUS CONTINUOUS PRN
Status: DISCONTINUED | OUTPATIENT
Start: 2021-01-01 | End: 2021-01-01

## 2021-01-01 RX ORDER — ONDANSETRON 2 MG/ML
4 INJECTION INTRAMUSCULAR; INTRAVENOUS ONCE
Status: COMPLETED | OUTPATIENT
Start: 2021-01-01 | End: 2021-01-01

## 2021-01-01 RX ORDER — HALOPERIDOL 5 MG/ML
1 INJECTION INTRAMUSCULAR EVERY 4 HOURS PRN
Status: DISCONTINUED | OUTPATIENT
Start: 2021-01-01 | End: 2021-03-05 | Stop reason: HOSPADM

## 2021-01-01 RX ORDER — SODIUM CHLORIDE AND POTASSIUM CHLORIDE 150; 900 MG/100ML; MG/100ML
250 INJECTION, SOLUTION INTRAVENOUS CONTINUOUS PRN
Status: DISCONTINUED | OUTPATIENT
Start: 2021-01-01 | End: 2021-01-01

## 2021-01-01 RX ORDER — FUROSEMIDE 20 MG/1
20 TABLET ORAL DAILY
COMMUNITY

## 2021-01-01 RX ORDER — BISACODYL 5 MG/1
5 TABLET, DELAYED RELEASE ORAL DAILY PRN
Status: DISCONTINUED | OUTPATIENT
Start: 2021-01-01 | End: 2021-01-01

## 2021-01-01 RX ORDER — BISACODYL 10 MG
10 SUPPOSITORY, RECTAL RECTAL DAILY PRN
Status: DISCONTINUED | OUTPATIENT
Start: 2021-01-01 | End: 2021-03-05 | Stop reason: HOSPADM

## 2021-01-01 RX ORDER — CHLORAL HYDRATE 500 MG
CAPSULE ORAL
COMMUNITY

## 2021-01-01 RX ORDER — NICOTINE POLACRILEX 4 MG
15 LOZENGE BUCCAL
Status: CANCELLED | OUTPATIENT
Start: 2021-01-01

## 2021-01-01 RX ORDER — ISOSORBIDE MONONITRATE 60 MG/1
60 TABLET, EXTENDED RELEASE ORAL DAILY
COMMUNITY
End: 2021-01-01 | Stop reason: HOSPADM

## 2021-01-01 RX ORDER — POTASSIUM CHLORIDE, DEXTROSE MONOHYDRATE AND SODIUM CHLORIDE 300; 5; 900 MG/100ML; G/100ML; MG/100ML
150 INJECTION, SOLUTION INTRAVENOUS CONTINUOUS PRN
Status: DISCONTINUED | OUTPATIENT
Start: 2021-01-01 | End: 2021-01-01

## 2021-01-01 RX ORDER — DEXTROSE, SODIUM CHLORIDE, AND POTASSIUM CHLORIDE 5; .45; .3 G/100ML; G/100ML; G/100ML
150 INJECTION INTRAVENOUS CONTINUOUS PRN
Status: DISCONTINUED | OUTPATIENT
Start: 2021-01-01 | End: 2021-01-01

## 2021-01-01 RX ORDER — SODIUM CHLORIDE 0.9 % (FLUSH) 0.9 %
10 SYRINGE (ML) INJECTION AS NEEDED
Status: DISCONTINUED | OUTPATIENT
Start: 2021-01-01 | End: 2021-03-05 | Stop reason: HOSPADM

## 2021-01-01 RX ORDER — MORPHINE SULFATE 4 MG/ML
4 INJECTION, SOLUTION INTRAMUSCULAR; INTRAVENOUS
Status: CANCELLED | OUTPATIENT
Start: 2021-01-01 | End: 2021-03-13

## 2021-01-01 RX ORDER — CARVEDILOL 12.5 MG/1
12.5 TABLET ORAL 2 TIMES DAILY WITH MEALS
Qty: 60 TABLET | Refills: 0 | Status: SHIPPED | OUTPATIENT
Start: 2021-01-01

## 2021-01-01 RX ADMIN — PANTOPRAZOLE SODIUM 40 MG: 40 TABLET, DELAYED RELEASE ORAL at 05:19

## 2021-01-01 RX ADMIN — Medication 5 MG: at 00:18

## 2021-01-01 RX ADMIN — SODIUM CHLORIDE, PRESERVATIVE FREE 10 ML: 5 INJECTION INTRAVENOUS at 21:00

## 2021-01-01 RX ADMIN — INSULIN LISPRO 14 UNITS: 100 INJECTION, SOLUTION INTRAVENOUS; SUBCUTANEOUS at 12:15

## 2021-01-01 RX ADMIN — POLYMYXIN B SULFATE, TRIMETHOPRIM SULFATE 1 DROP: 10000; 1 SOLUTION/ DROPS OPHTHALMIC at 15:54

## 2021-01-01 RX ADMIN — INSULIN LISPRO 15 UNITS: 100 INJECTION, SOLUTION INTRAVENOUS; SUBCUTANEOUS at 12:51

## 2021-01-01 RX ADMIN — ASPIRIN 81 MG: 81 TABLET, FILM COATED ORAL at 08:13

## 2021-01-01 RX ADMIN — MORPHINE SULFATE 4 MG: 4 INJECTION, SOLUTION INTRAMUSCULAR; INTRAVENOUS at 16:34

## 2021-01-01 RX ADMIN — CARVEDILOL 6.25 MG: 6.25 TABLET, FILM COATED ORAL at 08:13

## 2021-01-01 RX ADMIN — RIVAROXABAN 20 MG: 20 TABLET, FILM COATED ORAL at 08:55

## 2021-01-01 RX ADMIN — GLYCOPYRROLATE 0.2 MG: 0.2 INJECTION INTRAMUSCULAR; INTRAVENOUS at 23:17

## 2021-01-01 RX ADMIN — INSULIN DETEMIR 15 UNITS: 100 INJECTION, SOLUTION SUBCUTANEOUS at 06:14

## 2021-01-01 RX ADMIN — Medication 5 MG: at 02:38

## 2021-01-01 RX ADMIN — TAZOBACTAM SODIUM AND PIPERACILLIN SODIUM 3.38 G: 375; 3 INJECTION, SOLUTION INTRAVENOUS at 22:02

## 2021-01-01 RX ADMIN — INSULIN HUMAN 7 UNITS: 100 INJECTION, SOLUTION PARENTERAL at 01:35

## 2021-01-01 RX ADMIN — TAZOBACTAM SODIUM AND PIPERACILLIN SODIUM 3.38 G: 375; 3 INJECTION, SOLUTION INTRAVENOUS at 08:59

## 2021-01-01 RX ADMIN — INSULIN DETEMIR 22 UNITS: 100 INJECTION, SOLUTION SUBCUTANEOUS at 20:27

## 2021-01-01 RX ADMIN — DULOXETINE HYDROCHLORIDE 60 MG: 60 CAPSULE, DELAYED RELEASE ORAL at 09:02

## 2021-01-01 RX ADMIN — INSULIN HUMAN 20 UNITS: 500 INJECTION, SOLUTION SUBCUTANEOUS at 05:31

## 2021-01-01 RX ADMIN — INSULIN HUMAN 15 UNITS: 100 INJECTION, SOLUTION PARENTERAL at 12:56

## 2021-01-01 RX ADMIN — CASTOR OIL AND BALSAM, PERU: 788; 87 OINTMENT TOPICAL at 08:16

## 2021-01-01 RX ADMIN — ISOSORBIDE MONONITRATE 60 MG: 60 TABLET, EXTENDED RELEASE ORAL at 09:01

## 2021-01-01 RX ADMIN — SODIUM CHLORIDE, PRESERVATIVE FREE 10 ML: 5 INJECTION INTRAVENOUS at 08:14

## 2021-01-01 RX ADMIN — INSULIN LISPRO 20 UNITS: 100 INJECTION, SOLUTION INTRAVENOUS; SUBCUTANEOUS at 12:51

## 2021-01-01 RX ADMIN — ACETAMINOPHEN 650 MG: 325 TABLET, FILM COATED ORAL at 08:12

## 2021-01-01 RX ADMIN — SODIUM CHLORIDE, PRESERVATIVE FREE 10 ML: 5 INJECTION INTRAVENOUS at 08:52

## 2021-01-01 RX ADMIN — POLYMYXIN B SULFATE, TRIMETHOPRIM SULFATE 1 DROP: 10000; 1 SOLUTION/ DROPS OPHTHALMIC at 05:31

## 2021-01-01 RX ADMIN — GABAPENTIN 400 MG: 400 CAPSULE ORAL at 09:02

## 2021-01-01 RX ADMIN — ISOSORBIDE MONONITRATE 60 MG: 60 TABLET, EXTENDED RELEASE ORAL at 08:00

## 2021-01-01 RX ADMIN — PRAVASTATIN SODIUM 40 MG: 40 TABLET ORAL at 08:13

## 2021-01-01 RX ADMIN — RIVAROXABAN 20 MG: 20 TABLET, FILM COATED ORAL at 08:48

## 2021-01-01 RX ADMIN — POLYMYXIN B SULFATE, TRIMETHOPRIM SULFATE 1 DROP: 10000; 1 SOLUTION/ DROPS OPHTHALMIC at 21:40

## 2021-01-01 RX ADMIN — POLYMYXIN B SULFATE, TRIMETHOPRIM SULFATE 1 DROP: 10000; 1 SOLUTION/ DROPS OPHTHALMIC at 08:12

## 2021-01-01 RX ADMIN — INSULIN DETEMIR 18 UNITS: 100 INJECTION, SOLUTION SUBCUTANEOUS at 08:01

## 2021-01-01 RX ADMIN — GLYCOPYRROLATE 0.2 MG: 0.2 INJECTION INTRAMUSCULAR; INTRAVENOUS at 19:34

## 2021-01-01 RX ADMIN — PRAVASTATIN SODIUM 40 MG: 40 TABLET ORAL at 08:55

## 2021-01-01 RX ADMIN — GLYCOPYRROLATE 0.2 MG: 0.2 INJECTION INTRAMUSCULAR; INTRAVENOUS at 10:02

## 2021-01-01 RX ADMIN — SODIUM CHLORIDE 600 MG: 9 INJECTION, SOLUTION INTRAVENOUS at 08:16

## 2021-01-01 RX ADMIN — ISOSORBIDE MONONITRATE 60 MG: 60 TABLET, EXTENDED RELEASE ORAL at 08:08

## 2021-01-01 RX ADMIN — SODIUM CHLORIDE 50 ML/HR: 9 INJECTION, SOLUTION INTRAVENOUS at 02:26

## 2021-01-01 RX ADMIN — CARVEDILOL 6.25 MG: 6.25 TABLET, FILM COATED ORAL at 09:02

## 2021-01-01 RX ADMIN — INSULIN LISPRO 12 UNITS: 100 INJECTION, SOLUTION INTRAVENOUS; SUBCUTANEOUS at 08:12

## 2021-01-01 RX ADMIN — INSULIN LISPRO 7 UNITS: 100 INJECTION, SOLUTION INTRAVENOUS; SUBCUTANEOUS at 11:34

## 2021-01-01 RX ADMIN — PANTOPRAZOLE SODIUM 40 MG: 40 TABLET, DELAYED RELEASE ORAL at 05:32

## 2021-01-01 RX ADMIN — SODIUM CHLORIDE 75 ML/HR: 9 INJECTION, SOLUTION INTRAVENOUS at 07:18

## 2021-01-01 RX ADMIN — FUROSEMIDE 20 MG: 20 TABLET ORAL at 08:48

## 2021-01-01 RX ADMIN — GABAPENTIN 400 MG: 400 CAPSULE ORAL at 20:26

## 2021-01-01 RX ADMIN — CARVEDILOL 6.25 MG: 6.25 TABLET, FILM COATED ORAL at 17:07

## 2021-01-01 RX ADMIN — PRAVASTATIN SODIUM 40 MG: 40 TABLET ORAL at 09:02

## 2021-01-01 RX ADMIN — INSULIN HUMAN 14 UNITS: 100 INJECTION, SOLUTION PARENTERAL at 23:14

## 2021-01-01 RX ADMIN — CARVEDILOL 12.5 MG: 12.5 TABLET, FILM COATED ORAL at 18:51

## 2021-01-01 RX ADMIN — OXYCODONE HYDROCHLORIDE 15 MG: 15 TABLET ORAL at 15:26

## 2021-01-01 RX ADMIN — CARVEDILOL 6.25 MG: 6.25 TABLET, FILM COATED ORAL at 08:00

## 2021-01-01 RX ADMIN — INSULIN LISPRO 10 UNITS: 100 INJECTION, SOLUTION INTRAVENOUS; SUBCUTANEOUS at 17:18

## 2021-01-01 RX ADMIN — INSULIN HUMAN 7 UNITS: 100 INJECTION, SOLUTION PARENTERAL at 05:21

## 2021-01-01 RX ADMIN — IOPAMIDOL 73 ML: 755 INJECTION, SOLUTION INTRAVENOUS at 00:25

## 2021-01-01 RX ADMIN — INSULIN LISPRO 12 UNITS: 100 INJECTION, SOLUTION INTRAVENOUS; SUBCUTANEOUS at 15:09

## 2021-01-01 RX ADMIN — SODIUM CHLORIDE 1000 ML: 9 INJECTION, SOLUTION INTRAVENOUS at 14:59

## 2021-01-01 RX ADMIN — OXYCODONE HYDROCHLORIDE 15 MG: 15 TABLET ORAL at 10:31

## 2021-01-01 RX ADMIN — POLYMYXIN B SULFATE, TRIMETHOPRIM SULFATE 1 DROP: 10000; 1 SOLUTION/ DROPS OPHTHALMIC at 00:34

## 2021-01-01 RX ADMIN — ASPIRIN 81 MG: 81 TABLET, FILM COATED ORAL at 08:48

## 2021-01-01 RX ADMIN — LORAZEPAM 0.5 MG: 2 INJECTION INTRAMUSCULAR; INTRAVENOUS at 16:34

## 2021-01-01 RX ADMIN — POLYMYXIN B SULFATE, TRIMETHOPRIM SULFATE 1 DROP: 10000; 1 SOLUTION/ DROPS OPHTHALMIC at 08:49

## 2021-01-01 RX ADMIN — ONDANSETRON 4 MG: 2 INJECTION INTRAMUSCULAR; INTRAVENOUS at 15:35

## 2021-01-01 RX ADMIN — POLYMYXIN B SULFATE, TRIMETHOPRIM SULFATE 1 DROP: 10000; 1 SOLUTION/ DROPS OPHTHALMIC at 23:48

## 2021-01-01 RX ADMIN — INSULIN DETEMIR 10 UNITS: 100 INJECTION, SOLUTION SUBCUTANEOUS at 08:55

## 2021-01-01 RX ADMIN — INSULIN HUMAN 4 UNITS/HR: 1 INJECTION, SOLUTION INTRAVENOUS at 22:25

## 2021-01-01 RX ADMIN — GABAPENTIN 400 MG: 400 CAPSULE ORAL at 08:48

## 2021-01-01 RX ADMIN — INSULIN LISPRO 8 UNITS: 100 INJECTION, SOLUTION INTRAVENOUS; SUBCUTANEOUS at 16:30

## 2021-01-01 RX ADMIN — MORPHINE SULFATE 4 MG: 4 INJECTION, SOLUTION INTRAMUSCULAR; INTRAVENOUS at 19:34

## 2021-01-01 RX ADMIN — GLYCOPYRROLATE 0.2 MG: 0.2 INJECTION INTRAMUSCULAR; INTRAVENOUS at 05:46

## 2021-01-01 RX ADMIN — INSULIN DETEMIR 15 UNITS: 100 INJECTION, SOLUTION SUBCUTANEOUS at 00:50

## 2021-01-01 RX ADMIN — POTASSIUM CHLORIDE 40 MEQ: 10 CAPSULE, COATED, EXTENDED RELEASE ORAL at 23:06

## 2021-01-01 RX ADMIN — FUROSEMIDE 20 MG: 20 TABLET ORAL at 08:08

## 2021-01-01 RX ADMIN — Medication 5 MG: at 21:40

## 2021-01-01 RX ADMIN — INSULIN LISPRO 14 UNITS: 100 INJECTION, SOLUTION INTRAVENOUS; SUBCUTANEOUS at 18:37

## 2021-01-01 RX ADMIN — OXYCODONE HYDROCHLORIDE 15 MG: 15 TABLET ORAL at 00:51

## 2021-01-01 RX ADMIN — PRAVASTATIN SODIUM 40 MG: 40 TABLET ORAL at 08:48

## 2021-01-01 RX ADMIN — INSULIN LISPRO 12 UNITS: 100 INJECTION, SOLUTION INTRAVENOUS; SUBCUTANEOUS at 07:59

## 2021-01-01 RX ADMIN — INSULIN LISPRO 4 UNITS: 100 INJECTION, SOLUTION INTRAVENOUS; SUBCUTANEOUS at 11:11

## 2021-01-01 RX ADMIN — INSULIN HUMAN 9 UNITS: 100 INJECTION, SOLUTION PARENTERAL at 12:33

## 2021-01-01 RX ADMIN — ACETAMINOPHEN 650 MG: 325 TABLET, FILM COATED ORAL at 02:29

## 2021-01-01 RX ADMIN — GABAPENTIN 400 MG: 400 CAPSULE ORAL at 15:10

## 2021-01-01 RX ADMIN — FUROSEMIDE 20 MG: 20 TABLET ORAL at 08:00

## 2021-01-01 RX ADMIN — MORPHINE SULFATE 4 MG: 4 INJECTION, SOLUTION INTRAMUSCULAR; INTRAVENOUS at 14:23

## 2021-01-01 RX ADMIN — PRAVASTATIN SODIUM 40 MG: 40 TABLET ORAL at 08:00

## 2021-01-01 RX ADMIN — POLYMYXIN B SULFATE, TRIMETHOPRIM SULFATE 1 DROP: 10000; 1 SOLUTION/ DROPS OPHTHALMIC at 11:36

## 2021-01-01 RX ADMIN — OXYCODONE HYDROCHLORIDE 15 MG: 15 TABLET ORAL at 05:31

## 2021-01-01 RX ADMIN — CARVEDILOL 12.5 MG: 12.5 TABLET, FILM COATED ORAL at 08:48

## 2021-01-01 RX ADMIN — INSULIN HUMAN 10 UNITS: 100 INJECTION, SOLUTION PARENTERAL at 15:35

## 2021-01-01 RX ADMIN — VANCOMYCIN HYDROCHLORIDE 2000 MG: 100 INJECTION, POWDER, LYOPHILIZED, FOR SOLUTION INTRAVENOUS at 14:15

## 2021-01-01 RX ADMIN — INSULIN HUMAN 9 UNITS: 100 INJECTION, SOLUTION PARENTERAL at 18:12

## 2021-01-01 RX ADMIN — SODIUM CHLORIDE, PRESERVATIVE FREE 10 ML: 5 INJECTION INTRAVENOUS at 21:41

## 2021-01-01 RX ADMIN — GABAPENTIN 400 MG: 400 CAPSULE ORAL at 08:00

## 2021-01-01 RX ADMIN — INSULIN DETEMIR 5 UNITS: 100 INJECTION, SOLUTION SUBCUTANEOUS at 05:52

## 2021-01-01 RX ADMIN — MORPHINE SULFATE 4 MG: 4 INJECTION, SOLUTION INTRAMUSCULAR; INTRAVENOUS at 23:17

## 2021-01-01 RX ADMIN — ASPIRIN 81 MG: 81 TABLET, FILM COATED ORAL at 15:22

## 2021-01-01 RX ADMIN — PANTOPRAZOLE SODIUM 40 MG: 40 TABLET, DELAYED RELEASE ORAL at 05:08

## 2021-01-01 RX ADMIN — DULOXETINE HYDROCHLORIDE 60 MG: 60 CAPSULE, DELAYED RELEASE ORAL at 08:48

## 2021-01-01 RX ADMIN — RIVAROXABAN 20 MG: 20 TABLET, FILM COATED ORAL at 10:30

## 2021-01-01 RX ADMIN — POLYMYXIN B SULFATE, TRIMETHOPRIM SULFATE 1 DROP: 10000; 1 SOLUTION/ DROPS OPHTHALMIC at 17:07

## 2021-01-01 RX ADMIN — RANOLAZINE 500 MG: 500 TABLET, FILM COATED, EXTENDED RELEASE ORAL at 20:31

## 2021-01-01 RX ADMIN — POLYMYXIN B SULFATE, TRIMETHOPRIM SULFATE 1 DROP: 10000; 1 SOLUTION/ DROPS OPHTHALMIC at 09:01

## 2021-01-01 RX ADMIN — SODIUM CHLORIDE 1000 ML: 9 INJECTION, SOLUTION INTRAVENOUS at 14:14

## 2021-01-01 RX ADMIN — INSULIN HUMAN 5 UNITS: 100 INJECTION, SOLUTION PARENTERAL at 22:27

## 2021-01-01 RX ADMIN — MORPHINE SULFATE 4 MG: 4 INJECTION, SOLUTION INTRAMUSCULAR; INTRAVENOUS at 18:12

## 2021-01-01 RX ADMIN — ISOSORBIDE MONONITRATE 60 MG: 60 TABLET, EXTENDED RELEASE ORAL at 15:22

## 2021-01-01 RX ADMIN — INSULIN LISPRO 6 UNITS: 100 INJECTION, SOLUTION INTRAVENOUS; SUBCUTANEOUS at 16:31

## 2021-01-01 RX ADMIN — ERYTHROMYCIN: 5 OINTMENT OPHTHALMIC at 01:02

## 2021-01-01 RX ADMIN — GABAPENTIN 400 MG: 400 CAPSULE ORAL at 08:09

## 2021-01-01 RX ADMIN — MORPHINE SULFATE 4 MG: 4 INJECTION, SOLUTION INTRAMUSCULAR; INTRAVENOUS at 05:53

## 2021-01-01 RX ADMIN — PANTOPRAZOLE SODIUM 40 MG: 40 TABLET, DELAYED RELEASE ORAL at 05:56

## 2021-01-01 RX ADMIN — TAZOBACTAM SODIUM AND PIPERACILLIN SODIUM 3.38 G: 375; 3 INJECTION, SOLUTION INTRAVENOUS at 03:44

## 2021-01-01 RX ADMIN — DULOXETINE HYDROCHLORIDE 60 MG: 60 CAPSULE, DELAYED RELEASE ORAL at 15:22

## 2021-01-01 RX ADMIN — INSULIN LISPRO 14 UNITS: 100 INJECTION, SOLUTION INTRAVENOUS; SUBCUTANEOUS at 08:49

## 2021-01-01 RX ADMIN — GABAPENTIN 400 MG: 400 CAPSULE ORAL at 08:14

## 2021-01-01 RX ADMIN — MORPHINE SULFATE 4 MG: 4 INJECTION, SOLUTION INTRAMUSCULAR; INTRAVENOUS at 14:04

## 2021-01-01 RX ADMIN — CARVEDILOL 12.5 MG: 12.5 TABLET, FILM COATED ORAL at 17:19

## 2021-01-01 RX ADMIN — OXYCODONE HYDROCHLORIDE 15 MG: 15 TABLET ORAL at 15:55

## 2021-01-01 RX ADMIN — INSULIN HUMAN 10 UNITS: 100 INJECTION, SOLUTION PARENTERAL at 20:23

## 2021-01-01 RX ADMIN — INSULIN LISPRO 6 UNITS: 100 INJECTION, SOLUTION INTRAVENOUS; SUBCUTANEOUS at 08:00

## 2021-01-01 RX ADMIN — PANTOPRAZOLE SODIUM 40 MG: 40 TABLET, DELAYED RELEASE ORAL at 06:13

## 2021-01-01 RX ADMIN — SODIUM CHLORIDE, PRESERVATIVE FREE 10 ML: 5 INJECTION INTRAVENOUS at 08:09

## 2021-01-01 RX ADMIN — INSULIN HUMAN 30 UNITS: 500 INJECTION, SOLUTION SUBCUTANEOUS at 17:19

## 2021-01-01 RX ADMIN — SODIUM CHLORIDE 2000 ML: 9 INJECTION, SOLUTION INTRAVENOUS at 22:26

## 2021-01-01 RX ADMIN — POLYMYXIN B SULFATE, TRIMETHOPRIM SULFATE 1 DROP: 10000; 1 SOLUTION/ DROPS OPHTHALMIC at 08:08

## 2021-01-01 RX ADMIN — GABAPENTIN 400 MG: 400 CAPSULE ORAL at 22:32

## 2021-01-01 RX ADMIN — INSULIN DETEMIR 22 UNITS: 100 INJECTION, SOLUTION SUBCUTANEOUS at 08:14

## 2021-01-01 RX ADMIN — MORPHINE SULFATE 4 MG: 4 INJECTION, SOLUTION INTRAMUSCULAR; INTRAVENOUS at 10:03

## 2021-01-01 RX ADMIN — GLYCOPYRROLATE 0.2 MG: 0.2 INJECTION INTRAMUSCULAR; INTRAVENOUS at 13:43

## 2021-01-01 RX ADMIN — SODIUM CHLORIDE, PRESERVATIVE FREE 10 ML: 5 INJECTION INTRAVENOUS at 20:23

## 2021-01-01 RX ADMIN — GABAPENTIN 400 MG: 400 CAPSULE ORAL at 15:54

## 2021-01-01 RX ADMIN — SODIUM CHLORIDE 100 ML/HR: 9 INJECTION, SOLUTION INTRAVENOUS at 21:30

## 2021-01-01 RX ADMIN — DEXTROSE MONOHYDRATE 25 G: 25 INJECTION, SOLUTION INTRAVENOUS at 16:22

## 2021-01-01 RX ADMIN — POLYMYXIN B SULFATE, TRIMETHOPRIM SULFATE 1 DROP: 10000; 1 SOLUTION/ DROPS OPHTHALMIC at 04:51

## 2021-01-01 RX ADMIN — INSULIN LISPRO 9 UNITS: 100 INJECTION, SOLUTION INTRAVENOUS; SUBCUTANEOUS at 05:53

## 2021-01-01 RX ADMIN — POLYMYXIN B SULFATE, TRIMETHOPRIM SULFATE 1 DROP: 10000; 1 SOLUTION/ DROPS OPHTHALMIC at 03:22

## 2021-01-01 RX ADMIN — MAGNESIUM SULFATE HEPTAHYDRATE 4 G: 40 INJECTION, SOLUTION INTRAVENOUS at 07:17

## 2021-01-01 RX ADMIN — SODIUM CHLORIDE 450 MG: 9 INJECTION, SOLUTION INTRAVENOUS at 15:39

## 2021-01-01 RX ADMIN — ACETAMINOPHEN 650 MG: 650 SUPPOSITORY RECTAL at 08:59

## 2021-01-01 RX ADMIN — DEXTROSE MONOHYDRATE, SODIUM CHLORIDE, AND POTASSIUM CHLORIDE 150 ML/HR: 50; 9; 1.49 INJECTION, SOLUTION INTRAVENOUS at 03:17

## 2021-01-01 RX ADMIN — INSULIN HUMAN 10 UNITS: 100 INJECTION, SOLUTION PARENTERAL at 15:48

## 2021-01-01 RX ADMIN — GABAPENTIN 400 MG: 400 CAPSULE ORAL at 20:31

## 2021-01-01 RX ADMIN — DULOXETINE HYDROCHLORIDE 60 MG: 60 CAPSULE, DELAYED RELEASE ORAL at 08:13

## 2021-01-01 RX ADMIN — GABAPENTIN 400 MG: 400 CAPSULE ORAL at 15:22

## 2021-01-01 RX ADMIN — INSULIN LISPRO 10 UNITS: 100 INJECTION, SOLUTION INTRAVENOUS; SUBCUTANEOUS at 08:07

## 2021-01-01 RX ADMIN — INSULIN LISPRO 12 UNITS: 100 INJECTION, SOLUTION INTRAVENOUS; SUBCUTANEOUS at 21:40

## 2021-01-01 RX ADMIN — POLYMYXIN B SULFATE, TRIMETHOPRIM SULFATE 1 DROP: 10000; 1 SOLUTION/ DROPS OPHTHALMIC at 23:52

## 2021-01-01 RX ADMIN — ACETAMINOPHEN 650 MG: 325 TABLET, FILM COATED ORAL at 14:26

## 2021-01-01 RX ADMIN — POLYMYXIN B SULFATE, TRIMETHOPRIM SULFATE 1 DROP: 10000; 1 SOLUTION/ DROPS OPHTHALMIC at 20:32

## 2021-01-01 RX ADMIN — GABAPENTIN 400 MG: 400 CAPSULE ORAL at 21:40

## 2021-01-01 RX ADMIN — Medication 10 ML: at 22:00

## 2021-01-01 RX ADMIN — INSULIN DETEMIR 10 UNITS: 100 INJECTION, SOLUTION SUBCUTANEOUS at 23:49

## 2021-01-01 RX ADMIN — GABAPENTIN 400 MG: 400 CAPSULE ORAL at 20:23

## 2021-01-01 RX ADMIN — PANTOPRAZOLE SODIUM 40 MG: 40 TABLET, DELAYED RELEASE ORAL at 08:48

## 2021-01-01 RX ADMIN — OXYCODONE HYDROCHLORIDE 15 MG: 15 TABLET ORAL at 20:31

## 2021-01-01 RX ADMIN — PRAVASTATIN SODIUM 40 MG: 40 TABLET ORAL at 08:09

## 2021-01-01 RX ADMIN — IOPAMIDOL 59 ML: 755 INJECTION, SOLUTION INTRAVENOUS at 17:46

## 2021-01-01 RX ADMIN — POTASSIUM & SODIUM PHOSPHATES POWDER PACK 280-160-250 MG 2 PACKET: 280-160-250 PACK at 06:13

## 2021-01-01 RX ADMIN — ISOSORBIDE MONONITRATE 60 MG: 60 TABLET, EXTENDED RELEASE ORAL at 08:13

## 2021-01-01 RX ADMIN — INSULIN DETEMIR 18 UNITS: 100 INJECTION, SOLUTION SUBCUTANEOUS at 20:26

## 2021-01-01 RX ADMIN — GABAPENTIN 400 MG: 400 CAPSULE ORAL at 15:26

## 2021-01-01 RX ADMIN — SODIUM CHLORIDE, PRESERVATIVE FREE 10 ML: 5 INJECTION INTRAVENOUS at 22:02

## 2021-01-01 RX ADMIN — CARVEDILOL 12.5 MG: 12.5 TABLET, FILM COATED ORAL at 08:08

## 2021-01-01 RX ADMIN — INSULIN LISPRO 14 UNITS: 100 INJECTION, SOLUTION INTRAVENOUS; SUBCUTANEOUS at 11:59

## 2021-01-01 RX ADMIN — RANOLAZINE 500 MG: 500 TABLET, FILM COATED, EXTENDED RELEASE ORAL at 10:19

## 2021-01-01 RX ADMIN — INSULIN LISPRO 14 UNITS: 100 INJECTION, SOLUTION INTRAVENOUS; SUBCUTANEOUS at 17:36

## 2021-01-01 RX ADMIN — INSULIN HUMAN 20 UNITS: 500 INJECTION, SOLUTION SUBCUTANEOUS at 17:37

## 2021-01-01 RX ADMIN — ERYTHROMYCIN: 5 OINTMENT OPHTHALMIC at 08:59

## 2021-01-01 RX ADMIN — POLYMYXIN B SULFATE, TRIMETHOPRIM SULFATE 1 DROP: 10000; 1 SOLUTION/ DROPS OPHTHALMIC at 20:23

## 2021-01-01 RX ADMIN — DULOXETINE HYDROCHLORIDE 60 MG: 60 CAPSULE, DELAYED RELEASE ORAL at 08:00

## 2021-01-01 RX ADMIN — TAZOBACTAM SODIUM AND PIPERACILLIN SODIUM 3.38 G: 375; 3 INJECTION, SOLUTION INTRAVENOUS at 18:15

## 2021-01-01 RX ADMIN — POTASSIUM CHLORIDE AND SODIUM CHLORIDE 250 ML/HR: 900; 150 INJECTION, SOLUTION INTRAVENOUS at 00:21

## 2021-01-01 RX ADMIN — OXYCODONE HYDROCHLORIDE 15 MG: 15 TABLET ORAL at 17:19

## 2021-01-01 RX ADMIN — Medication 5 MG: at 20:26

## 2021-01-01 RX ADMIN — MORPHINE SULFATE 4 MG: 4 INJECTION, SOLUTION INTRAMUSCULAR; INTRAVENOUS at 07:49

## 2021-01-01 RX ADMIN — POLYMYXIN B SULFATE, TRIMETHOPRIM SULFATE 1 DROP: 10000; 1 SOLUTION/ DROPS OPHTHALMIC at 17:18

## 2021-01-01 RX ADMIN — ASPIRIN 81 MG: 81 TABLET, FILM COATED ORAL at 08:09

## 2021-01-01 RX ADMIN — ERYTHROMYCIN: 5 OINTMENT OPHTHALMIC at 21:50

## 2021-01-01 RX ADMIN — INSULIN LISPRO 8 UNITS: 100 INJECTION, SOLUTION INTRAVENOUS; SUBCUTANEOUS at 11:10

## 2021-01-01 RX ADMIN — POLYMYXIN B SULFATE, TRIMETHOPRIM SULFATE 1 DROP: 10000; 1 SOLUTION/ DROPS OPHTHALMIC at 12:44

## 2021-01-01 RX ADMIN — RIVAROXABAN 20 MG: 20 TABLET, FILM COATED ORAL at 09:02

## 2021-01-01 RX ADMIN — POTASSIUM CHLORIDE 20 MEQ: 10 CAPSULE, COATED, EXTENDED RELEASE ORAL at 16:48

## 2021-01-01 RX ADMIN — INSULIN HUMAN 15 UNITS: 100 INJECTION, SOLUTION PARENTERAL at 17:30

## 2021-01-01 RX ADMIN — INSULIN LISPRO 14 UNITS: 100 INJECTION, SOLUTION INTRAVENOUS; SUBCUTANEOUS at 20:31

## 2021-01-01 RX ADMIN — LORAZEPAM 0.5 MG: 2 INJECTION INTRAMUSCULAR; INTRAVENOUS at 07:49

## 2021-01-01 RX ADMIN — RANOLAZINE 500 MG: 500 TABLET, FILM COATED, EXTENDED RELEASE ORAL at 10:30

## 2021-01-01 RX ADMIN — VANCOMYCIN HYDROCHLORIDE 2000 MG: 100 INJECTION, POWDER, LYOPHILIZED, FOR SOLUTION INTRAVENOUS at 22:09

## 2021-01-01 RX ADMIN — POLYMYXIN B SULFATE, TRIMETHOPRIM SULFATE 1 DROP: 10000; 1 SOLUTION/ DROPS OPHTHALMIC at 13:13

## 2021-01-01 RX ADMIN — DULOXETINE HYDROCHLORIDE 60 MG: 60 CAPSULE, DELAYED RELEASE ORAL at 08:09

## 2021-01-01 RX ADMIN — POLYMYXIN B SULFATE, TRIMETHOPRIM SULFATE 1 DROP: 10000; 1 SOLUTION/ DROPS OPHTHALMIC at 00:18

## 2021-01-01 RX ADMIN — RIVAROXABAN 20 MG: 20 TABLET, FILM COATED ORAL at 08:00

## 2021-01-01 RX ADMIN — ERYTHROMYCIN 1 APPLICATION: 5 OINTMENT OPHTHALMIC at 08:16

## 2021-01-01 RX ADMIN — ACETAMINOPHEN 650 MG: 325 TABLET, FILM COATED ORAL at 16:04

## 2021-01-16 PROBLEM — C61 PROSTATE CANCER METASTATIC TO BONE (HCC): Status: ACTIVE | Noted: 2021-01-01

## 2021-01-16 PROBLEM — K21.9 GERD WITHOUT ESOPHAGITIS: Status: ACTIVE | Noted: 2021-01-01

## 2021-01-16 PROBLEM — I10 ESSENTIAL HYPERTENSION: Status: ACTIVE | Noted: 2021-01-01

## 2021-01-16 PROBLEM — E11.10 DKA (DIABETIC KETOACIDOSES): Status: ACTIVE | Noted: 2021-01-01

## 2021-01-16 PROBLEM — I25.10 CAD (CORONARY ARTERY DISEASE): Status: ACTIVE | Noted: 2021-01-01

## 2021-01-16 PROBLEM — E78.5 HLD (HYPERLIPIDEMIA): Status: ACTIVE | Noted: 2021-01-01

## 2021-01-16 PROBLEM — C79.51 PROSTATE CANCER METASTATIC TO BONE (HCC): Status: ACTIVE | Noted: 2021-01-01

## 2021-01-16 PROBLEM — I50.9 CHF (CONGESTIVE HEART FAILURE) (HCC): Status: ACTIVE | Noted: 2021-01-01

## 2021-01-16 PROBLEM — E11.9 DM2 (DIABETES MELLITUS, TYPE 2) (HCC): Status: ACTIVE | Noted: 2021-01-01

## 2021-01-16 PROBLEM — R73.9 HYPERGLYCEMIA: Status: ACTIVE | Noted: 2021-01-01

## 2021-01-16 PROBLEM — D64.9 ANEMIA: Status: ACTIVE | Noted: 2021-01-01

## 2021-01-16 NOTE — ED PROVIDER NOTES
EMERGENCY DEPARTMENT ENCOUNTER    Room Number:  29/29  Date of encounter:  1/16/2021  PCP: Provider, No Known  Historian: Patient      HPI:  Chief Complaint: Generalized weakness        Context: Shlomo Mcallister is a 70 y.o. male who presents to the ED c/o severe generalized weakness which is gradually increasing over the last week.  He notes he has been unable to eat food for the last 4 days.  He has had multiple falls over the last 3 days.  He is currently living in his aunts basement with his wife.  She is in poor health and had recent foot fractures so is unable to care for the patient.  He reports mild nonproductive cough.  He suffering from nausea and vomiting and has not been able to even keep down stew.  He has not taken his medications including insulin and greater than 48 hours.  Fingerstick blood sugars yesterday was 466.  He reports loose stool today.      PAST MEDICAL HISTORY  Active Ambulatory Problems     Diagnosis Date Noted   • No Active Ambulatory Problems     Resolved Ambulatory Problems     Diagnosis Date Noted   • No Resolved Ambulatory Problems     No Additional Past Medical History         PAST SURGICAL HISTORY  No past surgical history on file.      FAMILY HISTORY  No family history on file.      SOCIAL HISTORY  Social History     Socioeconomic History   • Marital status:      Spouse name: Not on file   • Number of children: Not on file   • Years of education: Not on file   • Highest education level: Not on file         ALLERGIES  Uroxatral [alfuzosin hcl er]        REVIEW OF SYSTEMS  Review of Systems     All systems reviewed and negative except for those discussed in HPI.       PHYSICAL EXAM    I have reviewed the triage vital signs and nursing notes.    ED Triage Vitals [01/16/21 1413]   Temp Heart Rate Resp BP SpO2   98.4 °F (36.9 °C) 80 16 148/87 99 %      Temp src Heart Rate Source Patient Position BP Location FiO2 (%)   Oral Monitor Lying Left arm --       Physical Exam  GENERAL:    Appears disheveled and generally very weak.  He remains lying on his left side facing away from the door throughout my time in the room.  HENT: Nares patent.  Dry mucous membranes  EYES: No scleral icterus.  CV: Regular rhythm, regular rate.  No murmurs gallops rubs  RESPIRATORY: Normal effort.  No audible wheezes, rales or rhonchi.  Clear to auscultation  ABDOMEN: Soft, nontender.  Protuberant with adipose  MUSCULOSKELETAL: No deformities.   NEURO: Awake but slow to answer questions and slow to move, moves all extremities, follows commands.  SKIN: Warm, dry, no rash visualized.        LAB RESULTS  Recent Results (from the past 24 hour(s))   POC Glucose Once    Collection Time: 01/16/21  2:12 PM    Specimen: Blood   Result Value Ref Range    Glucose 519 (C) 70 - 130 mg/dL   Comprehensive Metabolic Panel    Collection Time: 01/16/21  3:02 PM    Specimen: Blood   Result Value Ref Range    Glucose 414 (C) 65 - 99 mg/dL    BUN 14 8 - 23 mg/dL    Creatinine 0.86 0.76 - 1.27 mg/dL    Sodium 134 (L) 136 - 145 mmol/L    Potassium 4.4 3.5 - 5.2 mmol/L    Chloride 93 (L) 98 - 107 mmol/L    CO2 21.0 (L) 22.0 - 29.0 mmol/L    Calcium 9.2 8.6 - 10.5 mg/dL    Total Protein 8.2 6.0 - 8.5 g/dL    Albumin 3.40 (L) 3.50 - 5.20 g/dL    ALT (SGPT) 6 1 - 41 U/L    AST (SGOT) 27 1 - 40 U/L    Alkaline Phosphatase 574 (H) 39 - 117 U/L    Total Bilirubin 0.3 0.0 - 1.2 mg/dL    eGFR Non African Amer 88 >60 mL/min/1.73    Globulin 4.8 gm/dL    A/G Ratio 0.7 g/dL    BUN/Creatinine Ratio 16.3 7.0 - 25.0    Anion Gap 20.0 (H) 5.0 - 15.0 mmol/L   Light Blue Top    Collection Time: 01/16/21  3:02 PM   Result Value Ref Range    Extra Tube hold for add-on    Green Top (Gel)    Collection Time: 01/16/21  3:02 PM   Result Value Ref Range    Extra Tube Hold for add-ons.    Lavender Top    Collection Time: 01/16/21  3:02 PM   Result Value Ref Range    Extra Tube hold for add-on    Gold Top - SST    Collection Time: 01/16/21  3:02 PM   Result Value  Ref Range    Extra Tube Hold for add-ons.    CBC Auto Differential    Collection Time: 01/16/21  3:02 PM    Specimen: Blood   Result Value Ref Range    WBC 4.29 3.40 - 10.80 10*3/mm3    RBC 3.50 (L) 4.14 - 5.80 10*6/mm3    Hemoglobin 10.1 (L) 13.0 - 17.7 g/dL    Hematocrit 34.5 (L) 37.5 - 51.0 %    MCV 98.6 (H) 79.0 - 97.0 fL    MCH 28.9 26.6 - 33.0 pg    MCHC 29.3 (L) 31.5 - 35.7 g/dL    RDW 19.9 (H) 12.3 - 15.4 %    RDW-SD 72.7 (H) 37.0 - 54.0 fl    MPV 9.8 6.0 - 12.0 fL    Platelets 220 140 - 450 10*3/mm3    Neutrophil % 67.9 42.7 - 76.0 %    Lymphocyte % 21.2 19.6 - 45.3 %    Monocyte % 8.6 5.0 - 12.0 %    Eosinophil % 0.9 0.3 - 6.2 %    Basophil % 0.5 0.0 - 1.5 %    Immature Grans % 0.9 (H) 0.0 - 0.5 %    Neutrophils, Absolute 2.91 1.70 - 7.00 10*3/mm3    Lymphocytes, Absolute 0.91 0.70 - 3.10 10*3/mm3    Monocytes, Absolute 0.37 0.10 - 0.90 10*3/mm3    Eosinophils, Absolute 0.04 0.00 - 0.40 10*3/mm3    Basophils, Absolute 0.02 0.00 - 0.20 10*3/mm3    Immature Grans, Absolute 0.04 0.00 - 0.05 10*3/mm3    nRBC 0.0 0.0 - 0.2 /100 WBC   Beta Hydroxybutyrate Quantitative    Collection Time: 01/16/21  3:02 PM    Specimen: Blood   Result Value Ref Range    Beta-Hydroxybutyrate Quant 4.373 (H) 0.020 - 0.270 mmol/L   BNP    Collection Time: 01/16/21  3:02 PM    Specimen: Blood   Result Value Ref Range    proBNP 5,110.0 (H) 0.0 - 900.0 pg/mL   Scan Slide    Collection Time: 01/16/21  3:02 PM    Specimen: Blood   Result Value Ref Range    Ovalocytes Slight/1+ None Seen    Schistocytes Slight/1+ None Seen    WBC Morphology Normal Normal    Platelet Morphology Normal Normal   POC Glucose Once    Collection Time: 01/16/21  4:09 PM    Specimen: Blood   Result Value Ref Range    Glucose 435 (C) 70 - 130 mg/dL   Blood Gas, Arterial With Co-Ox    Collection Time: 01/16/21  5:05 PM    Specimen: Arterial Blood   Result Value Ref Range    Site Right Radial     Giovani's Test N/A     pH, Arterial 7.391 7.350 - 7.450 pH units    pCO2,  Arterial 34.9 (L) 35.0 - 45.0 mm Hg    pO2, Arterial 67.5 (L) 83.0 - 108.0 mm Hg    HCO3, Arterial 21.2 20.0 - 26.0 mmol/L    Base Excess, Arterial -3.3 (L) 0.0 - 2.0 mmol/L    Hemoglobin, Blood Gas 9.7 (L) 13.5 - 17.5 g/dL    Hematocrit, Blood Gas 29.8 %    Oxyhemoglobin 92.1 (L) 94 - 99 %    Methemoglobin 0.20 0.00 - 1.50 %    Carboxyhemoglobin 1.4 0 - 2 %    CO2 Content 22.2 22 - 33 mmol/L    Temperature 37.0 C    Barometric Pressure for Blood Gas      Modality Room Air     FIO2 21 %    Ventilator Mode       Note      pH, Temp Corrected 7.391 pH Units    pCO2, Temperature Corrected 34.9 (L) 35 - 48 mm Hg    pO2, Temperature Corrected 67.5 (L) 83 - 108 mm Hg   POC Glucose Once    Collection Time: 01/16/21  5:16 PM    Specimen: Blood   Result Value Ref Range    Glucose 388 (H) 70 - 130 mg/dL   POC Glucose Once    Collection Time: 01/16/21  6:15 PM    Specimen: Blood   Result Value Ref Range    Glucose 402 (C) 70 - 130 mg/dL       If labs were ordered, I independently reviewed the results.        RADIOLOGY  Xr Chest 1 View    Result Date: 1/16/2021   EXAMINATION: XR CHEST, SINGLE VIEW - 01/16/2021  INDICATION: Weakness, difficulty walking.  COMPARISON: None.  FINDINGS: Portable chest reveals heart to be enlarged. Underlying chronic changes within the lung fields. Elevation of the right hemidiaphragm. Portacatheter on the right tip in the SVC. Atelectatic changes in the lung bases with no superimposed infectious process. Degenerative changes seen within the spine.         Lung fields are grossly clear with no superimposed infectious process. Portacatheter remains on the right with tip in the SVC. Lung volumes are low.  DICTATED:   01/16/2021 EDITED/ls :   01/16/2021         I ordered and reviewed the above noted radiographic studies.    I viewed images of chest x-ray which showed no active disease per my independent interpretation.  See radiologist's dictation for official interpretation.         PROCEDURES    Procedures    No orders to display       MEDICATIONS GIVEN IN ER    Medications   sodium chloride 0.9 % flush 10 mL (has no administration in time range)   ondansetron (ZOFRAN) injection 4 mg (4 mg Intravenous Given 1/16/21 1535)   insulin regular (humuLIN R,novoLIN R) injection 10 Units (10 Units Subcutaneous Given 1/16/21 1535)         PROGRESS, DATA ANALYSIS, CONSULTS, AND MEDICAL DECISION MAKING    All labs have been independently reviewed by me.  All radiology studies have been reviewed by me and the radiologist dictating the report.   EKG's have been independently viewed and interpreted by me.      Differential diagnoses: Generalized weakness with hyperglycemia, cough, vomiting and diarrhea.  Consider viral illness versus Covid versus occult bacterial illness.      ED Course as of Jan 16 1901   Sat Jan 16, 2021   1647 Patient receiving IV normal saline bolus along with subcutaneous insulin and IV Zofran.  Patient feeling far too weak to be discharged home.  Unable to care for himself at this point.  Anticipate admission.    [MS]   1649 Glucose(!!): 414 [MS]   1649 Beta-Hydroxybutyrate Quant(!): 4.373 [MS]   1649 proBNP(!): 5,110.0 [MS]   1649 Alkaline Phosphatase(!): 574 [MS]   1901 I spoke with Dr. Samson who will admit.    [MS]      ED Course User Index  [MS] Vazquez Stovall MD             AS OF 19:01 EST VITALS:    BP - 112/74  HR - 72  TEMP - 98.4 °F (36.9 °C) (Oral)  O2 SATS - 95%        DIAGNOSIS  Final diagnoses:   Generalized weakness   Recurrent falls   Hyperglycemia   Dehydration         DISPOSITION  Admission           Vazquez Stovall MD  01/16/21 1901

## 2021-01-17 PROBLEM — I50.9 CHRONIC CONGESTIVE HEART FAILURE (HCC): Status: ACTIVE | Noted: 2021-01-01

## 2021-01-17 PROBLEM — D63.8 ANEMIA, CHRONIC DISEASE: Status: ACTIVE | Noted: 2021-01-01

## 2021-01-17 NOTE — PLAN OF CARE
Goal Outcome Evaluation:  Plan of Care Reviewed With: patient  Progress: no change  Outcome Summary: Pt admitted to the floor for DKA. Will start insulin drip shortly. VSS. Will continue to monitor. 2215 1/16/2021

## 2021-01-17 NOTE — PLAN OF CARE
Goal Outcome Evaluation:  Plan of Care Reviewed With: patient, spouse  Progress: improving  Outcome Summary: Patient resting in bed throughout the night. VSS and on room air. Patient reports feeling improved. Up with assist x1 to the bathroom. Will continue to monitor.

## 2021-01-17 NOTE — PROGRESS NOTES
[facility] Medicine Services  PROGRESS NOTE    Patient Name: Shlomo Mcallister  : 1950  MRN: [id number]    Date of Admission: 2021  Primary Care Physician: Provider, No Known    Subjective   Subjective     CC:  f/u DKA    HPI:  Didn't sleep well, but maybe feels a little better overall.  Glc down to 100.  Denies SOB, CP.  Maybe has a little appetite this morning and wants to eat some breakfast.  Hasn't been out of bed yet.    ROS:  Gen- No fevers, chills  CV- No chest pain, palpitations  Resp- No cough, dyspnea  GI- as above    Objective   Objective     Vital Signs:   Temp:  [97.6 °F (36.4 °C)-98.4 °F (36.9 °C)] 97.6 °F (36.4 °C)  Heart Rate:  [70-86] 80  Resp:  [16-18] 18  BP: (112-174)/(61-91) 146/71        Physical Exam:  Constitutional: No acute distress, awake, alert, appears older than age, lying in bed  HENT: NCAT, mucous membranes moist  Respiratory: Clear to auscultation bilaterally, respiratory effort normal on RA with sats ~92%  Cardiovascular: RRR, no murmurs, rubs, or gallops  Gastrointestinal: obese, minimal diffuse tenderness, soft, nondistended  Musculoskeletal: No bilateral ankle edema  Psychiatric: Appropriate affect, cooperative  Neurologic: Oriented x 3, no focal deficits  Skin: No rashes    Results Reviewed:  Results from last 7 days   Lab Units 21  2251 21  1502   WBC 10*3/mm3 4.43 4.29   HEMOGLOBIN g/dL 9.8* 10.1*   HEMATOCRIT % 33.8* 34.5*   PLATELETS 10*3/mm3 231 220     Results from last 7 days   Lab Units 21  0422 21  2251 21  1502   SODIUM mmol/L 139 131* 134*   POTASSIUM mmol/L 3.7 3.8 4.4   CHLORIDE mmol/L 104 94* 93*   CO2 mmol/L 25.0 18.0* 21.0*   BUN mg/dL 12 15 14   CREATININE mg/dL 0.68* 0.80 0.86   GLUCOSE mg/dL 144* 386* 414*   CALCIUM mg/dL 8.5* 8.7 9.2   ALT (SGPT) U/L  --  8 6   AST (SGOT) U/L  --  22 27   TROPONIN T ng/mL  --   --  0.015   PROBNP pg/mL  --   --  5,110.0*     Estimated Creatinine Clearance: 97  mL/min (A) (by C-G formula based on SCr of 0.68 mg/dL (L)).    Microbiology Results Abnormal     Procedure Component Value - Date/Time    COVID PRE-OP / PRE-PROCEDURE SCREENING ORDER (NO ISOLATION) - Swab, Nasopharynx [[id number]]  (Normal) Collected: 01/16/21 1958    Lab Status: Final result Specimen: Swab from Nasopharynx Updated: 01/16/21 2111    Narrative:      The following orders were created for panel order COVID PRE-OP / PRE-PROCEDURE SCREENING ORDER (NO ISOLATION) - Swab, Nasopharynx.  Procedure                               Abnormality         Status                     ---------                               -----------         ------                     COVID-19 and FLU A/B PCR...[[id number]]  Normal              Final result                 Please view results for these tests on the individual orders.    COVID-19 and FLU A/B PCR - Swab, Nasopharynx [[id number]]  (Normal) Collected: 01/16/21 1958    Lab Status: Final result Specimen: Swab from Nasopharynx Updated: 01/16/21 2111     COVID19 Not Detected     Influenza A PCR Not Detected     Influenza B PCR Not Detected    Narrative:      Fact sheet for providers: https://www.fda.gov/media/865702/download    Fact sheet for patients: https://www.fda.gov/media/817672/download    Test performed by PCR.          Imaging Results (Last 24 Hours)     Procedure Component Value Units Date/Time    XR Chest 1 View [[id number]] Collected: 01/16/21 1625     Updated: 01/16/21 1745    Narrative:         EXAMINATION: XR CHEST, SINGLE VIEW - 01/16/2021     INDICATION: Weakness, difficulty walking.     COMPARISON: None.     FINDINGS: Portable chest reveals heart to be enlarged. Underlying  chronic changes within the lung fields. Elevation of the right  hemidiaphragm. Portacatheter on the right tip in the SVC. Atelectatic  changes in the lung bases with no superimposed infectious process.  Degenerative changes seen within the spine.           Impression:      Lung fields are  grossly clear with no superimposed  infectious process. Portacatheter remains on the right with tip in the  SVC. Lung volumes are low.     DICTATED:   2021  EDITED/ls :   2021                Results for orders placed in visit on 14   CONVERTED (HISTORICAL) ECHO    Narrative Patient:      ALEJA ANDRES    Med Rec#:     [id number]               :          1950            Date:         2014            Age:          63y                   Height:       170.18 cm / 67.0 in  Weight:       122.47 kg / 269.9 lbs  Sex:          M                     BSA:          2.3  Room#:        3352-2                    Sonographer:  Russell Garces THAO  Referring:    Sissy Delong MD  Reading:      Sissy Delong MD  ______________________________________________________________________    Transthoracic Echocardiogram    BP:           125/93  HR:           63    Conclusions  1. The estimated ejection fraction is 55-60%.   2. There is septal flattening of the interventricular septum consistent  with right ventricular volume or pressure overload.   3. The right ventricular global systolic function is moderately  reduced.  4. There is a trace of mitral regurgitation.  5. There is mild tricuspid regurgitation.    Findings       Technical Comments:  The study quality is good.  The study is technically limited due to poor  apical windows. Due to body habitus - obesity.     Left Ventricle:  The left ventricular chamber size is normal. Global left ventricular  wall motion and contractility are within normal limits. There is normal  left ventricular systolic function. The estimated ejection fraction is  55-60%.  There is septal flattening of the interventricular septum  consistent with right ventricular volume or pressure overload.      Left Atrium:  The left atrium is slightly dilated.     Right Ventricle:  The right ventricle is mild to moderately dilated.  The right  ventricular global systolic  function is moderately reduced.     Right Atrium:  The right atrial cavity size is normal. No atrial septal defect is  visualized.     Aortic Valve:  The aortic valve is trileaflet. There is no evidence of aortic  regurgitation. There is no evidence of aortic stenosis.     Mitral Valve:  The mitral valve leaflets appear normal. There is no evidence of mitral  valve prolapse. There is a trace of mitral regurgitation. There is no  evidence of mitral stenosis.     Tricuspid Valve:  The tricuspid valve leaflets are normal.  There is mild tricuspid  regurgitation. The right ventricular systolic pressure is calculated at  27 mmHg.      Pulmonic Valve:  The pulmonic valve appears normal. There is a trace pulmonic  regurgitation.      Pericardium:  There is no evidence of pericardial effusion.     Aorta:  There is no dilatation of the aortic root.     Pulmonary Artery:  The main pulmonary artery appears normal.     Venous:  The venous system appears normal.     Measurements   Chambers  Name                    Value           RVIDd (AP) 2D           3.6 cm          IVSd (2D)               1.1 cm          LVIDd (2D)              4.16 cm         LVIDs (2D)              2.88 cm         LVPWd (2D)              1.36 cm         EF (2D)                 58.7 %          Ao root diameter (2D)   3.1 cm          LA dimension (AP) 2D    4.2 cm          LA:Ao ratio (2D)        1.35 ratio        Diastolic/Systolic Function  Name                    Value           LV lateral e' Vmax      0.1 m/sec         Tricuspid Valve  Name                    Value           TR Vmax                 2.06 m/sec      TR peak gradient        17 mmHg         RVSP                    27 mmHg           Pulmonic Valve/Qp:Qs  Name                    Value           ND end-diastolic Vmax   0.85 m/sec        Electronically signed by: Sissy Delong MD on 02/03/2014  16:05:33       I have reviewed the medications:  Scheduled Meds:carvedilol, 6.25 mg, Oral,  Daily  DULoxetine, 60 mg, Oral, Daily  furosemide, 20 mg, Oral, Daily  gabapentin, 400 mg, Oral, TID  insulin detemir, 15 Units, Subcutaneous, Q12H  insulin lispro, 0-14 Units, Subcutaneous, TID AC  insulin lispro, 4 Units, Subcutaneous, TID With Meals  isosorbide mononitrate, 60 mg, Oral, Daily  pantoprazole, 40 mg, Oral, Daily  pravastatin, 40 mg, Oral, Daily  rivaroxaban, 20 mg, Oral, Daily With Breakfast  sodium chloride, 10 mL, Intravenous, Q12H      Continuous Infusions:sodium chloride, 50 mL/hr, Last Rate: 50 mL/hr (01/17/21 0740)      PRN Meds:.•  acetaminophen  •  dextrose  •  dextrose  •  glucagon (human recombinant)  •  magnesium sulfate **OR** magnesium sulfate **OR** magnesium sulfate  •  melatonin  •  ondansetron  •  oxyCODONE  •  potassium & sodium phosphates **OR** potassium & sodium phosphates  •  sodium chloride    Assessment/Plan   Assessment & Plan     Active Hospital Problems    Diagnosis  POA   • **DKA (diabetic ketoacidoses) (CMS/HCC) [E11.10]  Yes     Priority: Medium   • Chronic congestive heart failure (CMS/HCC) [I50.9]  Yes   • Anemia, chronic disease [D63.8]  Yes   • DM2 (diabetes mellitus, type 2) (CMS/HCC) [E11.9]  Yes   • Prostate cancer metastatic to bone (CMS/HCC) [C61, C79.51]  Yes   • Essential hypertension [I10]  Yes   • HLD (hyperlipidemia) [E78.5]  Yes   • CAD (coronary artery disease) [I25.10]  Yes      Resolved Hospital Problems   No resolved problems to display.        Brief Hospital Course to date:  Shlomo Mcallister is a 70 y.o. male with a history of type 2 diabetes, coronary artery disease status post CABG, history of PE and DVT on Xarelto, metastatic prostate cancer currently getting chemotherapy in [place] who presents with 4 days of nausea and vomiting and hyperglycemia.  Found to have increased anion gap and positive beta hyperoxia butyrate with a glucose of greater than 500.    DKA  Uncontrolled DM2   -anion gap is now closed.  Will transition off insulin gtt to  basal/bolus.  Takes 37u of U500 at home.  Will titrate up as needed.  - said he hasn't take insulin at home recently, DM educator to see.    Chronic CHF (type unknown)  - proBNP ~5000, but clinically stable and compensated  -Last echo here was in 2014, no indication to repeat right now    Metastatic prostate cancer (data deficit)  -Managed in [place], but is post have chemo at [facility] this week    Hypophosphatemia  -Replace    Hypertension  -Continue home medications, currently doing well    History of PE and DVT  -Continue Xarelto    Debility and falls  -Needs PT and OT evaluations    DVT Prophylaxis:  xarelto      Disposition: I expect the patient to be discharged home versus rehab, complicated by patient wanting to get home to [place].  Will discuss with case management tomorrow.    CODE STATUS:   Code Status and Medical Interventions:   Ordered at: 01/16/21 2100     Level Of Support Discussed With:    Patient     Code Status:    CPR     Medical Interventions (Level of Support Prior to Arrest):    Full       Shant Segundo MD  01/17/21

## 2021-01-17 NOTE — PLAN OF CARE
Goal Outcome Evaluation:  Plan of Care Reviewed With: patient  Progress: no change  Outcome Summary: OT evaluation completed.  Pt. admitted for DKA, but had just been treated for prostate cancer metastisized to bones and was to get more treatment.  To [place] for visit at [facility] and wife fell and had not returned home to [place] as planned.  Pt. demonstrating very mild change in strength, balance and endurance.  Pt. MI in and out of bed, donned/doffed shoe independently, min A first stand, CGA second stand and mobilized in hallway CGA using wx.  Pt. appropriate for skilled OT services to promote return to PLOF and will likely be able to discharge home with family assist.

## 2021-01-17 NOTE — PLAN OF CARE
Goal Outcome Evaluation:  Plan of Care Reviewed With: patient, spouse  Progress: no change  Outcome Summary: PT evaluation completed. Pt presents with decreased endurance affecting his mobility. Pt Jahaira with bed mobility, req CGA for ambulating 60' using RW. Pt req one standing rest break due to fatigue and reported weakness in LEs after ambulation. Pt will benefit from IP PT services. Recommend home with assist upon DC.

## 2021-01-17 NOTE — THERAPY EVALUATION
Patient Name: Shlomo Mcallister  : 1950    MRN: [id number]                              Today's Date: 2021       Admit Date: 2021    Visit Dx:     ICD-10-CM ICD-9-CM   1. Generalized weakness  R53.1 780.79   2. Recurrent falls  R29.6 V15.88   3. Hyperglycemia  R73.9 790.29   4. Dehydration  E86.0 276.51     Patient Active Problem List   Diagnosis   • DKA (diabetic ketoacidoses) (CMS/[facility], located [facility])   • Anemia, chronic disease   • DM2 (diabetes mellitus, type 2) (CMS/[facility])   • Prostate cancer metastatic to bone (CMS/[facility])   • Essential hypertension   • HLD (hyperlipidemia)   • GERD without esophagitis   • CAD (coronary artery disease)   • Chronic congestive heart failure (CMS/[facility])     Past Medical History:   Diagnosis Date   • Anemia 2021   • CAD (coronary artery disease) 2021   • CHF (congestive heart failure) (CMS/[facility]) 2021   • DM2 (diabetes mellitus, type 2) (CMS/[facility]) 2021   • DVT (deep venous thrombosis) (CMS/[facility])    • GERD without esophagitis 2021   • HLD (hyperlipidemia) 2021     Past Surgical History:   Procedure Laterality Date   • FL REANOMAL CORON ART PA ORIGIN BY GRAFT     • TURP / TRANSURETHRAL INCISION / DRAINAGE PROSTATE       General Information     Row Name 21 1405          Physical Therapy Time and Intention    Document Type  evaluation  -GUSTABO     Mode of Treatment  physical therapy  -GUSTABO     Row Name 21 1402          General Information    Patient Profile Reviewed  yes  -GUSTABO     Prior Level of Function  independent:;all household mobility;transfer;ADL's;dependent:;driving;home management;community mobility  -GUSTABO     Existing Precautions/Restrictions  fall Limited skin integrity due to chemo.  -GUSTABO     Barriers to Rehab  medically complex  -GUSTABO     Row Name 21 1409          Living Environment    Lives With  spouse Staying at aunt's right now due to wife breaking her foot.  -GUSTABO     Row Name 21 140          Home Main Entrance    Number of Stairs, Main  Entrance  one  -GUSTABO     Row Name 01/17/21 1409          Stairs Within Home, Primary    Number of Stairs, Within Home, Primary  none  -GUSTABO     Row Name 01/17/21 1409          Cognition    Orientation Status (Cognition)  oriented x 4  -GUSTABO     Row Name 01/17/21 1409          Safety Issues, Functional Mobility    Impairments Affecting Function (Mobility)  endurance/activity tolerance;strength  -GUSTABO       User Key  (r) = Recorded By, (t) = Taken By, (c) = Cosigned By    Initials Name Provider Type    Winter Angela, PT Physical Therapist        Mobility     Row Name 01/17/21 1411          Bed Mobility    Bed Mobility  scooting/bridging;supine-sit  -GUSTABO     Scooting/Bridging [place] (Bed Mobility)  verbal cues;standby assist  -GUSTABO     Supine-Sit [place] (Bed Mobility)  modified independence  -GUSTABO     Sit-Supine [place] (Bed Mobility)  modified independence  -GUSTABO     Assistive Device (Bed Mobility)  head of bed elevated;bed rails  -GUSTABO     Row Name 01/17/21 1411          Transfers    Comment (Transfers)  No cues needed for handplacement with RW.  -GUSTABO     Row Name 01/17/21 1411          Sit-Stand Transfer    Sit-Stand [place] (Transfers)  contact guard  -GUSTABO     Assistive Device (Sit-Stand Transfers)  walker, front-wheeled  -GUSTABO     Row Name 01/17/21 1411          Gait/Stairs (Locomotion)    [place] Level (Gait)  minimum assist (75% patient effort);verbal cues  -GUSTABO     Assistive Device (Gait)  walker, front-wheeled  -GUSTABO     Distance in Feet (Gait)  60  -GUSTABO     Deviations/Abnormal Patterns (Gait)  ed decreased  -GUSTABO     Bilateral Gait Deviations  forward flexed posture  -GUSTABO     Comment (Gait/Stairs)  Pt ambulated appropriate speed with FWW. Pt uses rollator at home. Pt req one standing rest break at 30' and reported his legs getting weak after about 45'. Further activity limited by fatigue.  -GUSTABO       User Key  (r) = Recorded By, (t) = Taken By, (c) = Cosigned By    Initials Name Provider Type    GUSTABO  "Winter Coppola, PT Physical Therapist        Obj/Interventions     Row Name 01/17/21 1413          Range of Motion Comprehensive    General Range of Motion  bilateral lower extremity ROM WFL  -     Row Name 01/17/21 1413          Strength Comprehensive (MMT)    General Manual Muscle Testing (MMT) Assessment  lower extremity strength deficits identified  -GUSTABO     Comment, General Manual Muscle Testing (MMT) Assessment  B LE grossly 4/5.  -[facility] Name 01/17/21 1413          Balance    Balance Assessment  sitting static balance;standing static balance  -     Static Sitting Balance  WFL;sitting, edge of bed;unsupported  -GUSTABO     Static Standing Balance  WFL;supported;standing  -     Row Name 01/17/21 1413          Sensory Assessment (Somatosensory)    Sensory Assessment (Somatosensory)  other (see comments) LE sensation \"off\" according to pt. Pt states he can feel bottoms of feet but feels different than UEs.  -GUSTABO       User Key  (r) = Recorded By, (t) = Taken By, (c) = Cosigned By    Initials Name Provider Type    GUSTABO Winter Coppola, PT Physical Therapist        Goals/Plan     [facility] Name 01/17/21 1417          Bed Mobility Goal 1 (PT)    Activity/Assistive Device (Bed Mobility Goal 1, PT)  sit to supine/supine to sit  -GUSTABO     [place] Level/Cues Needed (Bed Mobility Goal 1, PT)  independent  -GUSTABO     Time Frame (Bed Mobility Goal 1, PT)  2 weeks  -[facility] Name 01/17/21 1417          Transfer Goal 1 (PT)    Activity/Assistive Device (Transfer Goal 1, PT)  sit-to-stand/stand-to-sit;walker, rolling  -GUSTABO     [place] Level/Cues Needed (Transfer Goal 1, PT)  modified independence  -GUSTABO     Time Frame (Transfer Goal 1, PT)  2 weeks  -[facility] Name 01/17/21 1417          Gait Training Goal 1 (PT)    Activity/Assistive Device (Gait Training Goal 1, PT)  gait (walking locomotion);walker, rolling  -GUSTABO     [place] Level (Gait Training Goal 1, PT)  modified independence  -GUSTABO     Distance (Gait Training " Goal 1, PT)  100  -GUSTABO     Time Frame (Gait Training Goal 1, PT)  2 weeks  -     Row Name 01/17/21 1417          Stairs Goal 1 (PT)    Activity/Assistive Device (Stairs Goal 1, PT)  stairs, all skills  -GUSTABO     [place] Level/Cues Needed (Stairs Goal 1, PT)  modified independence  -GUSTABO     Number of Stairs (Stairs Goal 1, PT)  1  -GUSTABO     Time Frame (Stairs Goal 1, PT)  2 weeks  -GUSTABO       User Key  (r) = Recorded By, (t) = Taken By, (c) = Cosigned By    Initials Name Provider Type    Winter Angela, PT Physical Therapist        Clinical Impression     Row Name 01/17/21 1415          Pain    Additional Documentation  Pain Scale: Numbers Pre/Post-Treatment (Group)  -[facility] Name 01/17/21 1415          Pain Scale: Numbers Pre/Post-Treatment    Pretreatment Pain Rating  0/10 - no pain  -GUSTABO     Posttreatment Pain Rating  0/10 - no pain  -     Row Name 01/17/21 1416          Plan of Care Review    Plan of Care Reviewed With  patient;spouse  -     Progress  no change  -     Outcome Summary  PT evaluation completed. Pt presents with decreased endurance affecting his mobility. Pt Jahaira with bed mobility, req CGA for ambulating 60' using RW. Pt req one standing rest break due to fatigue and reported weakness in LEs after ambulation. Pt will benefit from IP PT services. Recommend home with assist upon DC.  -     Row Name 01/17/21 1410          Therapy Assessment/Plan (PT)    Patient/Family Therapy Goals Statement (PT)  Return home.  -GUSTABO     Rehab Potential (PT)  good, to achieve stated therapy goals  -     Criteria for Skilled Interventions Met (PT)  yes;meets criteria  -     Row Name 01/17/21 1419          Vital Signs    Pre Systolic BP Rehab  -- NT, nsg cleared.  -GUSTABO     O2 Delivery Pre Treatment  room air  -GUSTABO     O2 Delivery Intra Treatment  room air  -GUSTABO     O2 Delivery Post Treatment  room air  -GUSTABO     Pre Patient Position  Supine  -GUSTABO     Intra Patient Position  Standing  -GUSTABO     Post Patient  Position  Supine  -GUSTABO     Row Name 01/17/21 1415          Positioning and Restraints    Pre-Treatment Position  in bed  -GUSTABO     Post Treatment Position  bed  -GUSTABO     In Bed  supine;notified nsg;call light within reach;encouraged to call for assist;exit alarm on;with family/caregiver  -GUSTABO       User Key  (r) = Recorded By, (t) = Taken By, (c) = Cosigned By    Initials Name Provider Type    Winter Angela PT Physical Therapist        Outcome Measures     Row Name 01/17/21 1418          How much help from another person do you currently need...    Turning from your back to your side while in flat bed without using bedrails?  4  -GUSTABO     Moving from lying on back to sitting on the side of a flat bed without bedrails?  3  -GUSTABO     Moving to and from a bed to a chair (including a wheelchair)?  3  -GUSTABO     Standing up from a chair using your arms (e.g., wheelchair, bedside chair)?  3  -GUSTABO     Climbing 3-5 steps with a railing?  2  -GUSTABO     To walk in hospital room?  3  -GUSTABO     AM-PAC 6 Clicks Score (PT)  18  -GUSTABO     Row Name 01/17/21 1418          Functional Assessment    Outcome Measure Options  AM-PAC 6 Clicks Basic Mobility (PT)  -GUSTABO       User Key  (r) = Recorded By, (t) = Taken By, (c) = Cosigned By    Initials Name Provider Type    Winter Angela, AVILA Physical Therapist        Physical Therapy Education                 Title: PT OT SLP Therapies (In Progress)     Topic: Physical Therapy (In Progress)     Point: Mobility training (Done)     Learning Progress Summary           Patient Acceptance, E, VU,NR by GUSTABO at 1/17/2021 1419   Family Acceptance, E, VU,NR by GUSTABO at 1/17/2021 1419                   Point: Home exercise program (Not Started)     Learner Progress:  Not documented in this visit.          Point: Body mechanics (Done)     Learning Progress Summary           Patient Acceptance, E, VU,NR by GUSTABO at 1/17/2021 1419   Family Acceptance, E, VU,NR by GUSTABO at 1/17/2021 1419                   Point: Precautions  (Done)     Learning Progress Summary           Patient Acceptance, E, VU,NR by GUSTABO at 1/17/2021 1419   Family Acceptance, E, VU,NR by GUSTABO at 1/17/2021 1419                               User Key     Initials Effective Dates Name Provider Type Discipline     08/30/18 -  Winter Coppola PT Physical Therapist PT              PT Recommendation and Plan  Planned Therapy Interventions (PT): balance training, bed mobility training, gait training, home exercise program, strengthening, stair training, transfer training, patient/family education  Plan of Care Reviewed With: patient, spouse  Progress: no change  Outcome Summary: PT evaluation completed. Pt presents with decreased endurance affecting his mobility. Pt Jahaira with bed mobility, req CGA for ambulating 60' using RW. Pt req one standing rest break due to fatigue and reported weakness in LEs after ambulation. Pt will benefit from IP PT services. Recommend home with assist upon DC.     Time Calculation:   PT Charges     Row Name 01/17/21 1420             Time Calculation    Start Time  1343  -GUSTABO      PT Received On  01/17/21  -      PT Goal Re-Cert Due Date  01/27/21  -        User Key  (r) = Recorded By, (t) = Taken By, (c) = Cosigned By    Initials Name Provider Type    Winter Angela PT Physical Therapist        Therapy Charges for Today     Code Description Service Date Service Provider Modifiers Qty    85900506061 HC PT EVAL LOW COMPLEXITY 4 1/17/2021 Winter Coppola PT GP 1          PT G-Codes  Outcome Measure Options: AM-PAC 6 Clicks Basic Mobility (PT)  AM-PAC 6 Clicks Score (PT): 18  AM-PAC 6 Clicks Score (OT): 19    Winter Coppola PT  1/17/2021

## 2021-01-17 NOTE — H&P
"    [facility]  HISTORY AND PHYSICAL    Patient Name: Shlomo Mcallister  : 1950  MRN: [id number]  Primary Care Physician: Provider, No Known  Date of admission: 2021    Subjective   Subjective     Chief Complaint:  Generalized weakness    HPI:  Shlomo Mcallister is a 70 y.o. 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 Presents today with complaints of progressively worsening generalized weakness going on for the past 2 weeks. For 4 days there has been associated nausea with non bloody vomiting, anorexia, and recurrent falls. No head trauma or LOC. Patient also reporting uncontrolled blood sugars due to being ill. HPI limited. He is a very poor historian. Patient  is currently undergoing chemotherapy for prostate cancer at [facility] in [place], [place]. Last dose was 2020. Next Dose was scheduled for . Patient and wife however decided to come to [place] and visit family for [place]. Was supposed to stay for a week, however wife tripped and fell and broke her foot. They have been \"stuck\" in [place] without much of his medication. He has missed several Doctors' appointments and had difficulty getting some medications. Patient is scheduled to undergo remote chemotherapy at [facility] on Wednesday. Presents here for further evaluation and treatment. Currently there are no complaints of fever, cough, congestion, SOB, or chest pain. No syncope or lower extremity edema. Will admit to inpatient.       Current COVID Risks are:  [] Fever []  Cough [] Shortness of breath [x] Fatigue [] Change in taste or smell    [] Exposure to COVID positive patient  [] High risk facility   []  NONE    Review of Systems   Constitutional: Positive for fatigue. Negative for chills.   HENT: Negative for " congestion and trouble swallowing.    Eyes: Negative for photophobia and visual disturbance.   Respiratory: Negative for cough and shortness of breath.    Cardiovascular: Negative for chest pain and leg swelling.   Gastrointestinal: Positive for diarrhea, nausea and vomiting. Negative for abdominal pain.   Endocrine: Negative for cold intolerance and heat intolerance.   Genitourinary: Negative for dysuria and flank pain.   Musculoskeletal: Negative for back pain and gait problem.   Skin: Negative for pallor and rash.   Allergic/Immunologic: Positive for immunocompromised state.   Neurological: Positive for weakness. Negative for dizziness and headaches.   Hematological: Negative for adenopathy.   Psychiatric/Behavioral: Negative for agitation and confusion.        All other systems reviewed and are negative.     Personal History     Past Medical History:   Diagnosis Date   • Anemia 1/16/2021   • CAD (coronary artery disease) 1/16/2021   • CHF (congestive heart failure) (CMS/[facility]) 1/16/2021   • DM2 (diabetes mellitus, type 2) (CMS/[facility]) 1/16/2021   • DVT (deep venous thrombosis) (CMS/[facility])    • GERD without esophagitis 1/16/2021   • HLD (hyperlipidemia) 1/16/2021       Past Surgical History:   Procedure Laterality Date   • [place] REANOMAL CORON ART PA ORIGIN BY GRAFT     • TURP / TRANSURETHRAL INCISION / DRAINAGE PROSTATE         Family History: family history is not on file. Otherwise pertinent FHx was reviewed and unremarkable.     Social History:    Social History     Social History Narrative    Patient lives in [place] with Wife, she is his primary caregiver       Medications:  DULoxetine, calcium citrate-vitamin d, carvedilol, fish oil, furosemide, gabapentin, isosorbide mononitrate, metFORMIN, multivitamin with minerals, oxyCODONE, oxyCODONE ER, pantoprazole, pravastatin, and rivaroxaban    Allergies   Allergen Reactions   • Uroxatral [Alfuzosin Hcl Er] GI Intolerance       Objective   Objective     Vital Signs:    Temp:  [98.4 °F (36.9 °C)] 98.4 °F (36.9 °C)  Heart Rate:  [70-84] 84  Resp:  [16] 16  BP: (112-148)/(61-87) 127/78    Physical Exam   Constitutional: Awake, alert  Eyes: PERRLA, sclerae anicteric, no conjunctival injection  HENT: NCAT, mucous membranes moist  Neck: Supple, no thyromegaly, no lymphadenopathy, trachea midline  Respiratory: Clear to auscultation bilaterally, nonlabored respirations   Cardiovascular: RRR, no murmurs, rubs, or gallops, palpable pedal pulses bilaterally  Gastrointestinal: Positive bowel sounds, soft, nontender, nondistended  Musculoskeletal: No bilateral ankle edema, no clubbing or cyanosis to extremities  Psychiatric: flat affect, cooperative  Neurologic: Oriented x 3, strength symmetric in all extremities, Cranial Nerves grossly intact to confrontation, speech clear  Skin: No rashes      Results Reviewed:  I have personally reviewed most recent indicated data and agree with findings including:  [x]  Laboratory  [x]  Radiology  []  EKG/Telemetry  []  Pathology  []  Cardiac/Vascular Studies  [x]  Old records  []  Other:  Most pertinent findings include: vitals stable. Glucose 446. Alk phos 574. Anion gap 20. Beta hydroxy butyrate 4.37. H/H 10.1 and 34.5 respectively. CXR clear.       LAB RESULTS:      Lab 01/16/21  1502   WBC 4.29   HEMOGLOBIN 10.1*   HEMATOCRIT 34.5*   PLATELETS 220   NEUTROS ABS 2.91   IMMATURE GRANS (ABS) 0.04   LYMPHS ABS 0.91   MONOS ABS 0.37   EOS ABS 0.04   MCV 98.6*         Lab 01/16/21  1502   SODIUM 134*   POTASSIUM 4.4   CHLORIDE 93*   CO2 21.0*   ANION GAP 20.0*   BUN 14   CREATININE 0.86   GLUCOSE 414*   CALCIUM 9.2         Lab 01/16/21  1502   TOTAL PROTEIN 8.2   ALBUMIN 3.40*   GLOBULIN 4.8   ALT (SGPT) 6   AST (SGOT) 27   BILIRUBIN 0.3   ALK PHOS 574*         Lab 01/16/21  1502   PROBNP 5,110.0*                 Lab 01/16/21  1705   PH, ARTERIAL 7.391   PCO2, ARTERIAL 34.9*   PO2 ART 67.5*   FIO2 21   HCO3 ART 21.2   BASE EXCESS ART -3.3*    CARBOXYHEMOGLOBIN 1.4     Brief Urine Lab Results  (Last result in the past 365 days)      Color   Clarity   Blood   Leuk Est   Nitrite   Protein   CREAT   Urine HCG        21 1859 Yellow Cloudy Small (1+) Negative Negative Negative             Microbiology Results (last 10 days)     ** No results found for the last 240 hours. **          Xr Chest 1 View    Result Date: 2021   EXAMINATION: XR CHEST, SINGLE VIEW - 2021  INDICATION: Weakness, difficulty walking.  COMPARISON: None.  FINDINGS: Portable chest reveals heart to be enlarged. Underlying chronic changes within the lung fields. Elevation of the right hemidiaphragm. Portacatheter on the right tip in the SVC. Atelectatic changes in the lung bases with no superimposed infectious process. Degenerative changes seen within the spine.         Impression: Lung fields are grossly clear with no superimposed infectious process. Portacatheter remains on the right with tip in the SVC. Lung volumes are low.  DICTATED:   2021 EDITED/ls :   2021         Results for orders placed in visit on 14   CONVERTED (HISTORICAL) ECHO    Narrative Patient:      ALEJA ANDRES    [facility] Rec#:     [id number]               :          1950            Date:         2014            Age:          63y                   Height:       170.18 cm / 67.0 in  Weight:       122.47 kg / 269.9 lbs  Sex:          M                     BSA:          2.3  Room#:        [facility]-                    Sonographer:  Russell Garces [facility]  Referring:    Sissy Delong MD  Reading:      Sissy Delong MD  ______________________________________________________________________    Transthoracic Echocardiogram    BP:           125/93  HR:           63    Conclusions  1. The estimated ejection fraction is 55-60%.   2. There is septal flattening of the interventricular septum consistent  with right ventricular volume or pressure overload.   3. The right ventricular  global systolic function is moderately  reduced.  4. There is a trace of mitral regurgitation.  5. There is mild tricuspid regurgitation.    Findings       Technical Comments:  The study quality is good.  The study is technically limited due to poor  apical windows. Due to body habitus - obesity.     Left Ventricle:  The left ventricular chamber size is normal. Global left ventricular  wall motion and contractility are within normal limits. There is normal  left ventricular systolic function. The estimated ejection fraction is  55-60%.  There is septal flattening of the interventricular septum  consistent with right ventricular volume or pressure overload.      Left Atrium:  The left atrium is slightly dilated.     Right Ventricle:  The right ventricle is mild to moderately dilated.  The right  ventricular global systolic function is moderately reduced.     Right Atrium:  The right atrial cavity size is normal. No atrial septal defect is  visualized.     Aortic Valve:  The aortic valve is trileaflet. There is no evidence of aortic  regurgitation. There is no evidence of aortic stenosis.     Mitral Valve:  The mitral valve leaflets appear normal. There is no evidence of mitral  valve prolapse. There is a trace of mitral regurgitation. There is no  evidence of mitral stenosis.     Tricuspid Valve:  The tricuspid valve leaflets are normal.  There is mild tricuspid  regurgitation. The right ventricular systolic pressure is calculated at  27 mmHg.      Pulmonic Valve:  The pulmonic valve appears normal. There is a trace pulmonic  regurgitation.      Pericardium:  There is no evidence of pericardial effusion.     Aorta:  There is no dilatation of the aortic root.     Pulmonary Artery:  The main pulmonary artery appears normal.     Venous:  The venous system appears normal.     Measurements   Chambers  Name                    Value           RVIDd (AP) 2D           3.6 cm          IVSd (2D)               1.1 cm           LVIDd (2D)              4.16 cm         LVIDs (2D)              2.88 cm         LVPWd (2D)              1.36 cm         EF (2D)                 58.7 %          Ao root diameter (2D)   3.1 cm          LA dimension (AP) 2D    4.2 cm          LA:Ao ratio (2D)        1.35 ratio        Diastolic/Systolic Function  Name                    Value           LV lateral e' Vmax      0.1 m/sec         Tricuspid Valve  Name                    Value           TR Vmax                 2.06 m/sec      TR peak gradient        17 mmHg         RVSP                    27 mmHg           Pulmonic Valve/Qp:Qs  Name                    Value           IL end-diastolic Vmax   0.85 m/sec        Electronically signed by: Sissy Delong MD on 02/03/2014  16:05:33       Assessment/Plan   Assessment & Plan       DKA (diabetic ketoacidoses) (CMS/[facility])    DM2 (diabetes mellitus, type 2) (CMS/HCC)    CHF (congestive heart failure) (CMS/HCC)    Essential hypertension    CAD (coronary artery disease)    Anemia    Prostate cancer metastatic to bone (CMS/HCC)    HLD (hyperlipidemia)    GERD without esophagitis      1. DKA:  - uncontrolled DM2 with neuropathy. Insulin dependent. Recently switched from long acting/short acting to U500 twice daily  - follows with endocrinology in [place]. Has seen Dr. Mart Johns here in the past  - recently on steroids for chemotherapy  - A1c 9.5. consult to diabetic educator  - start DKA protocol. Start insulin drip at 4. Monitor chemistry closely  - hold oral hypoglycemics  - NPO for now    2. CHF:  - appears stable and compensated  - continue lasix and coreg    3. Hypertension:  - controlled and stable. Continue Imdur, coreg    4. Anemia:   - likely related to chemotherapy    5. Prostate cancer:  - metastatic to bone  - obtain records from [facility]  - pain control as needed  - PT/PT    DVT prophylaxis:  xarelto      CODE STATUS:  Full code  Code Status and Medical Interventions:   Ordered at:  01/16/21 2027     Level Of Support Discussed With:    Patient     Code Status:    CPR     Medical Interventions (Level of Support Prior to Arrest):    Full         This note has been completed as part of a split-shared workflow.     Electronically signed by Rommel Deng PA-C, 01/16/21, 8:30 PM EST.        Attending   Admission Attestation       I have seen and examined the patient, performing an independent face-to-face diagnostic evaluation with plan of care reviewed and developed with the advanced practice clinician (APC).      Brief Summary Statement:   Shlomo Mcallister is a 70 y.o. male with history of CAD, hypertension, CHF, DVT/PE, metastatic prostate cancer currently undergoing chemotherapy by his oncologist in [place] and DM 2 (on insulin therapy-U5 100 twice daily) presents with 4 to 5 days of progressive weakness, nausea, poor appetite, generalized weakness and falls.    Remainder of detailed HPI is as noted by APC and has been reviewed and/or edited by me for completeness.    Attending Physical Exam:  Constitutional -appears fatigued, in bed  HEENT-NCAT, mucous membranes moist  CV-RRR  Resp-CTAB, no wheezes, rhonchi or rales  Abd-soft, nontender, nondistended, normoactive bowel sounds, obese  Ext-No lower extremity cyanosis, clubbing or edema bilaterally  Neuro-alert and oriented, speech clear, moves all extremities   Psych-normal affect   Skin- No rash on exposed UE or LE bilaterally, however there is a pustule with eschar in the patient's mid left thigh, approximately 1 cm in diameter, no surrounding erythema, no tenderness with palpation      Brief Assessment/Plan :  See detailed assessment and plan developed with APC which I have reviewed and/or edited for completeness.    DKA  -Glucose 519 at admission, anion gap 20 with elevated beta hydroxybutyrate at 4.3  -While there is no acidosis on the ABG, the patient has not been able to eat for the past 4 to 5 days and is unlikely to be able to  metabolize ketones effectively without an insulin drip.  -Mr. Tse states that he takes insulin u500, approximately 35 units twice daily.  Before this he was on a basal bolus regimen.  Patient states he has been taking insulin since he was 17 years old.  He is currently followed by an endocrinologist in [place]  -We will start DKA protocol, but will need to monitor closely for volume overload; although the patient's proBNP is currently 5,110, clinically appears euvolemic    Metastatic prostate cancer  -It appears the patient has made temporary arrangements for his next chemotherapy infusion [facility] next week    CAD  -CABG 2009, subsequent drug-eluting stents    Generalized weakness  - PT/OT              Admission Status: I believe that this patient meets OBSERVATION status, however if further evaluation or treatment plans warrant, status may change.  Based upon current information, I predict patient's care encounter to be less than or equal to 2 midnights.        Chris Samson MD  01/16/21

## 2021-01-17 NOTE — THERAPY EVALUATION
Patient Name: Shlomo Mcallister  : 1950    MRN: [id number]                              Today's Date: 2021       Admit Date: 2021    Visit Dx:     ICD-10-CM ICD-9-CM   1. Generalized weakness  R53.1 780.79   2. Recurrent falls  R29.6 V15.88   3. Hyperglycemia  R73.9 790.29   4. Dehydration  E86.0 276.51     Patient Active Problem List   Diagnosis   • DKA (diabetic ketoacidoses) (CMS/[facility])   • Anemia, chronic disease   • DM2 (diabetes mellitus, type 2) (CMS/[facility])   • Prostate cancer metastatic to bone (CMS/[facility])   • Essential hypertension   • HLD (hyperlipidemia)   • GERD without esophagitis   • CAD (coronary artery disease)   • Chronic congestive heart failure (CMS/[facility])     Past Medical History:   Diagnosis Date   • Anemia 2021   • CAD (coronary artery disease) 2021   • CHF (congestive heart failure) (CMS/[facility]) 2021   • DM2 (diabetes mellitus, type 2) (CMS/[facility]) 2021   • DVT (deep venous thrombosis) (CMS/[facility])    • GERD without esophagitis 2021   • HLD (hyperlipidemia) 2021     Past Surgical History:   Procedure Laterality Date   • CA REANOMAL CORON ART PA ORIGIN BY GRAFT     • TURP / TRANSURETHRAL INCISION / DRAINAGE PROSTATE       General Information     Row Name 21 1402          OT Time and Intention    Document Type  evaluation  -GUSTABO     Mode of Treatment  individual therapy;occupational therapy  -GUSTABO     Row Name 21 1402          General Information    Patient Profile Reviewed  yes  -GUSTABO     Prior Level of Function  independent:;all household mobility;ADL's;dependent:;home management;driving;shopping;community mobility wife does all driving, HM and pushes pt. in w/c in community  -GUSTABO     Existing Precautions/Restrictions  fall limited skin integrity especially feet.  -GUSTABO     Row Name 21 1402          Living Environment    Lives With  spouse staying at aunts home while in [place]  -GUSTABO     Row Name 21 1402          Home Main Entrance    Number of Stairs, Main  Entrance  one  -GUSTABO     Row Name 01/17/21 1402          Stairs Within Home, Primary    Stairs, Within Home, Primary  one 1/2 step into aunts home, ramp in [place].  -GUSTABO     Stair Railings, Within Home, Primary  none  -GUSTABO     Stairs Comment, Within Home, Primary  wx in shower with chair at aunts home  -GUSTABO     Row Name 01/17/21 1402          Cognition    Orientation Status (Cognition)  oriented x 3  -GUSTABO     Row Name 01/17/21 1402          Safety Issues, Functional Mobility    Impairments Affecting Function (Mobility)  endurance/activity tolerance;strength  -GUSTABO     Comment, Safety Issues/Impairments (Mobility)  per pt. legs getting very weeak at end of mobilizing.  -GUSTABO       User Key  (r) = Recorded By, (t) = Taken By, (c) = Cosigned By    Initials Name Provider Type    Nara Vargas, OT Occupational Therapist          Mobility/ADL's     Row Name 01/17/21 1405          Bed Mobility    Bed Mobility  supine-sit;sit-supine  -GUSTABO     Supine-Sit [place] (Bed Mobility)  modified independence  -GUSTABO     Sit-Supine [place] (Bed Mobility)  modified independence  -GUSTABO     Assistive Device (Bed Mobility)  head of bed elevated;bed rails  -GUSTABO     Comment (Bed Mobility)  Per wife they have adjustable bed at home, but here with standard bed.  -     Row Name 01/17/21 1405          Transfers    Transfers  sit-stand transfer  -GUSTABO     Comment (Transfers)  min A first stand due to feet sliding out in front of him, but CGA second stand.  -GUSTABO     Sit-Stand [place] (Transfers)  minimum assist (75% patient effort);2 person assist  -     Row Name 01/17/21 1405          Sit-Stand Transfer    Assistive Device (Sit-Stand Transfers)  walker, front-wheeled  -GUSTABO     Row Name 01/17/21 1405          Functional Mobility    Functional Mobility- Ind. Level  contact guard assist;1 person + 1 person to manage equipment  -     Functional Mobility- Device  rolling walker  -     Functional Mobility-Distance (Feet)  65  -GUSTABO      Functional Mobility- Comment  flexed posture, shuffled steps at times  -     Row Name 01/17/21 1405          Activities of Daily Living    BADL Assessment/Intervention  lower body dressing;upper body dressing;grooming  -     Row Name 01/17/21 1405          Lower Body Dressing Assessment/Training    [place] Level (Lower Body Dressing)  doff;don;shoes/slippers;set up  -GUSTABO     Position (Lower Body Dressing)  edge of bed sitting  -     Row Name 01/17/21 1405          Upper Body Dressing Assessment/Training    [place] Level (Upper Body Dressing)  doff;don;pajama/robe;moderate assist (50% patient effort)  -     Comment (Upper Body Dressing)  limited by IV  -     Row Name 01/17/21 1405          Grooming Assessment/Training    [place] Level (Grooming)  wash face, hands  -     Position (Grooming)  edge of bed sitting  -       User Key  (r) = Recorded By, (t) = Taken By, (c) = Cosigned By    Initials Name Provider Type    Nara Vargas, OT Occupational Therapist        Obj/Interventions     Row Name 01/17/21 1407          Sensory Assessment (Somatosensory)    Sensory Assessment (Somatosensory)  UE sensation intact per pt. diminished LE's  -[facility] Name 01/17/21 1407          Vision Assessment/Intervention    Visual Impairment/Limitations  -- functional for evaluation purposes.  -     Row Name 01/17/21 1407          Range of Motion Comprehensive    General Range of Motion  bilateral upper extremity ROM WNL  -     Row Name 01/17/21 1407          Strength Comprehensive (MMT)    General Manual Muscle Testing (MMT) Assessment  no strength deficits identified  -     Comment, General Manual Muscle Testing (MMT) Assessment  BUE  -     Row Name 01/17/21 1407          Balance    Balance Assessment  sitting static balance;sitting dynamic balance;standing static balance;standing dynamic balance  -     Static Sitting Balance  WFL;sitting, edge of bed  -     Dynamic Sitting Balance   WFL;sitting, edge of bed  -GUSTABO     Static Standing Balance  WFL;supported  -GUSTABO     Dynamic Standing Balance  mild impairment;supported  -GUSTABO       User Key  (r) = Recorded By, (t) = Taken By, (c) = Cosigned By    Initials Name Provider Type    Nara Vargas, OT Occupational Therapist        Goals/Plan     Row Name 01/17/21 1413          Transfer Goal 1 (OT)    Activity/Assistive Device (Transfer Goal 1, OT)  sit-to-stand/stand-to-sit;toilet;walker, rolling grab bar  -GUSTABO     [place] Level/Cues Needed (Transfer Goal 1, OT)  standby assist  -GUSTABO     Time Frame (Transfer Goal 1, OT)  long term goal (LTG);10 days  -GUSTABO     Progress/Outcome (Transfer Goal 1, OT)  goal ongoing  -GUSTABO     Row Name 01/17/21 1413          Dressing Goal 1 (OT)    Activity/Device (Dressing Goal 1, OT)  lower body dressing undergarment  -GUSTABO     [place]/Cues Needed (Dressing Goal 1, OT)  standby assist  -GUSTABO     Time Frame (Dressing Goal 1, OT)  long term goal (LTG);10 days  -GUSTABO     Progress/Outcome (Dressing Goal 1, OT)  goal ongoing  -GUSTABO     Row Name 01/17/21 1413          Toileting Goal 1 (OT)    Activity/Device (Toileting Goal 1, OT)  toileting skills, all;commode;grab bar/safety frame  -GUSTABO     [place] Level/Cues Needed (Toileting Goal 1, OT)  standby assist  -GUSTABO     Time Frame (Toileting Goal 1, OT)  long term goal (LTG);10 days  -GUSTABO     Progress/Outcome (Toileting Goal 1, OT)  goal ongoing  -GUSTABO     Row Name 01/17/21 1413          Grooming Goal 1 (OT)    Activity/Device (Grooming Goal 1, OT)  hair care;oral care;wash face, hands  -GUSTABO     [place] (Grooming Goal 1, OT)  standby assist sinkside  -GUSTABO     Time Frame (Grooming Goal 1, OT)  long term goal (LTG);10 days  -GUSTABO     Progress/Outcome (Grooming Goal 1, OT)  goal ongoing  -GUSTABO     Row Name 01/17/21 1413          ROM Goal 1 (OT)    ROM Goal 1 (OT)  Pt. will demonstrate increased endurance to completed 15 reps each UE TE to support ADL independence.  -GUSTABO     Time Frame (ROM  Goal 1, OT)  long term goal (LTG);10 days  -GUSTABO     Progress/Outcome (ROM Goal 1, OT)  goal ongoing  -GUSTABO     Row Name 01/17/21 1413          Therapy Assessment/Plan (OT)    Planned Therapy Interventions (OT)  activity tolerance training;BADL retraining;occupation/activity based interventions;patient/caregiver education/training;ROM/therapeutic exercise;transfer/mobility retraining  -GUSTABO       User Key  (r) = Recorded By, (t) = Taken By, (c) = Cosigned By    Initials Name Provider Type    Nara Vargas, OT Occupational Therapist        Clinical Impression     Row Name 01/17/21 1407          Pain Assessment    Additional Documentation  Pain Scale: Numbers Pre/Post-Treatment (Group)  -GUSTABO     Row Name 01/17/21 1407          Pain Scale: Numbers Pre/Post-Treatment    Pretreatment Pain Rating  0/10 - no pain  -GUSTABO     Posttreatment Pain Rating  0/10 - no pain  -GUSTABO     Row Name 01/17/21 1400          Plan of Care Review    Plan of Care Reviewed With  patient  -GUSTABO     Progress  no change  -GUSTABO     Outcome Summary  OT evaluation completed.  Pt. admitted for DKA, but had just been treated for prostate cancer metastisized to bones and was to get more treatment.  To [place] for visit at [place] and wife fell and had not returned home to [place] as planned.  Pt. demonstrating very mild change in strength, balance and endurance.  Pt. MI in and out of bed, donned/doffed shoe independently, min A first stand, CGA second stand and mobilized in hallway CGA using wx.  Pt. appropriate for skilled OT services to promote return to [facility] and will likely be able to discharge home with family assist.  -GUSTABO     Row Name 01/17/21 1404          Therapy Assessment/Plan (OT)    Patient/Family Therapy Goal Statement (OT)  Be able to return home.  -GUSTABO     Rehab Potential (OT)  good, to achieve stated therapy goals  -GUSTABO     Criteria for Skilled Therapeutic Interventions Met (OT)  meets criteria;skilled treatment is necessary  -GUSTABO     Therapy Frequency  (OT)  daily  -GUSTABO     Row Name 01/17/21 1408          Therapy Plan Review/Discharge Plan (OT)    Anticipated Discharge Disposition (OT)  home with assist  -GUSTABO     Row Name 01/17/21 1408          Vital Signs    Pre Systolic BP Rehab  159  -GUSTABO     Pre Treatment Diastolic BP  84  -GUSTABO     Intra Systolic BP Rehab  139  -GUSTABO     Intra Treatment Diastolic BP  73  -GUSTABO     Pretreatment Heart Rate (beats/min)  72  -GUSTABO     Posttreatment Heart Rate (beats/min)  79  -GUSTABO     Pre SpO2 (%)  95  -GUSTABO     O2 Delivery Pre Treatment  room air  -GUSTABO     Post SpO2 (%)  98  -GUSTABO     O2 Delivery Post Treatment  supplemental O2  -GUSTABO     Pre Patient Position  Supine  -GUSTABO     Intra Patient Position  Standing BP taken supine  -GUSTABO     Post Patient Position  Supine  -GUSTABO     Row Name 01/17/21 1408          Positioning and Restraints    Pre-Treatment Position  in bed  -GUSTABO     Post Treatment Position  bed  -GUSTABO     In Bed  supine;call light within reach;encouraged to call for assist;exit alarm on;with family/caregiver  -GUSTABO       User Key  (r) = Recorded By, (t) = Taken By, (c) = Cosigned By    Initials Name Provider Type    Nara Vargas OT Occupational Therapist        Outcome Measures     Row Name 01/17/21 1415          How much help from another is currently needed...    Putting on and taking off regular lower body clothing?  3  -GUSTABO     Bathing (including washing, rinsing, and drying)  3  -GUSTABO     Toileting (which includes using toilet bed pan or urinal)  3  -GUSTABO     Putting on and taking off regular upper body clothing  3  -GUSTABO     Taking care of personal grooming (such as brushing teeth)  3  -GUSTABO     Eating meals  4  -GUSTABO     AM-PAC 6 Clicks Score (OT)  19  -GUSTABO     Row Name 01/17/21 1415          Functional Assessment    Outcome Measure Options  AM-PAC 6 Clicks Daily Activity (OT)  -GUSTABO       User Key  (r) = Recorded By, (t) = Taken By, (c) = Cosigned By    Initials Name Provider Type    Nara Vargas OT Occupational Therapist         Occupational Therapy Education                 Title: PT OT SLP Therapies (In Progress)     Topic: Occupational Therapy (In Progress)     Point: ADL training (Done)     Description:   Instruct learner(s) on proper safety adaptation and remediation techniques during self care or transfers.   Instruct in proper use of assistive devices.              Learning Progress Summary           Patient Acceptance, E, VU,NR by GUSTABO at 1/17/2021 1416    Comment: reason for consult, assessment results, goal setting   Family Acceptance, E, VU,NR by GUSTABO at 1/17/2021 1416    Comment: reason for consult, assessment results, goal setting                   Point: Home exercise program (Not Started)     Description:   Instruct learner(s) on appropriate technique for monitoring, assisting and/or progressing therapeutic exercises/activities.              Learner Progress:  Not documented in this visit.          Point: Precautions (Not Started)     Description:   Instruct learner(s) on prescribed precautions during self-care and functional transfers.              Learner Progress:  Not documented in this visit.          Point: Body mechanics (Not Started)     Description:   Instruct learner(s) on proper positioning and spine alignment during self-care, functional mobility activities and/or exercises.              Learner Progress:  Not documented in this visit.                      User Key     Initials Effective Dates Name Provider Type Discipline     06/08/18 -  Nara Leung, OT Occupational Therapist OT              OT Recommendation and Plan  Planned Therapy Interventions (OT): activity tolerance training, BADL retraining, occupation/activity based interventions, patient/caregiver education/training, ROM/therapeutic exercise, transfer/mobility retraining  Therapy Frequency (OT): daily  Plan of Care Review  Plan of Care Reviewed With: patient  Progress: no change  Outcome Summary: OT evaluation completed.  Pt. admitted for DKA, but  had just been treated for prostate cancer metastisized to bones and was to get more treatment.  To [place] for visit at [place] and wife fell and had not returned home to [place] as planned.  Pt. demonstrating very mild change in strength, balance and endurance.  Pt. MI in and out of bed, donned/doffed shoe independently, min A first stand, CGA second stand and mobilized in hallway CGA using wx.  Pt. appropriate for skilled OT services to promote return to [facility] and will likely be able to discharge home with family assist.     Time Calculation:   Time Calculation- OT     Row Name 01/17/21 1417             Time Calculation- OT    OT Start Time  1332  -GUSTABO      OT Received On  01/17/21  -GUSTABO      OT Goal Re-Cert Due Date  01/27/21  -GUSTABO        User Key  (r) = Recorded By, (t) = Taken By, (c) = Cosigned By    Initials Name Provider Type    Nara Vargas OT Occupational Therapist        Therapy Charges for Today     Code Description Service Date Service Provider Modifiers Qty    68177823882 HC OT EVAL LOW COMPLEXITY 4 1/17/2021 Nara Leung OT GO 1               Nara Leung OT  1/17/2021

## 2021-01-18 NOTE — PLAN OF CARE
Problem: Adult Inpatient Plan of Care  Goal: Plan of Care Review  Outcome: Ongoing, Progressing  Flowsheets (Taken 1/18/2021 0433)  Progress: improving  Plan of Care Reviewed With: patient  Outcome Summary: VSS. Room air. Patient asleep majority of shift. He still claims to feel much better than when he came in. No complaints at this time.  Goal: Patient-Specific Goal (Individualized)  Outcome: Ongoing, Progressing  Goal: Absence of Hospital-Acquired Illness or Injury  Outcome: Ongoing, Progressing  Intervention: Identify and Manage Fall Risk  Recent Flowsheet Documentation  Taken 1/18/2021 0400 by Lesli Bishop RN  Safety Promotion/Fall Prevention:   activity supervised   assistive device/personal items within reach   clutter free environment maintained   fall prevention program maintained   nonskid shoes/slippers when out of bed   room organization consistent   safety round/check completed   toileting scheduled  Taken 1/18/2021 0200 by Lesli Bishop RN  Safety Promotion/Fall Prevention:   activity supervised   assistive device/personal items within reach   clutter free environment maintained   fall prevention program maintained   nonskid shoes/slippers when out of bed   room organization consistent   safety round/check completed   toileting scheduled  Taken 1/18/2021 0000 by Lesil Bishop RN  Safety Promotion/Fall Prevention:   activity supervised   assistive device/personal items within reach   clutter free environment maintained   fall prevention program maintained   nonskid shoes/slippers when out of bed   room organization consistent   safety round/check completed   toileting scheduled  Taken 1/17/2021 2200 by Lesli Bishop, RN  Safety Promotion/Fall Prevention:   activity supervised   assistive device/personal items within reach   clutter free environment maintained   fall prevention program maintained   nonskid shoes/slippers when out of bed   room organization consistent   safety  round/check completed   toileting scheduled  Taken 1/17/2021 2000 by Lesli Bishop RN  Safety Promotion/Fall Prevention:   activity supervised   assistive device/personal items within reach   clutter free environment maintained   fall prevention program maintained   nonskid shoes/slippers when out of bed   room organization consistent   safety round/check completed   toileting scheduled  Intervention: Prevent Skin Injury  Recent Flowsheet Documentation  Taken 1/18/2021 0400 by Lesli Bishop RN  Body Position: position changed independently  Taken 1/18/2021 0000 by Lesli Bishop RN  Body Position: position changed independently  Taken 1/17/2021 2200 by Lesli Bishop RN  Body Position: position changed independently  Taken 1/17/2021 2000 by Lesli Bishop RN  Body Position: side-lying, right  Intervention: Prevent and Manage VTE (venous thromboembolism) Risk  Recent Flowsheet Documentation  Taken 1/17/2021 2000 by Lesli Bishop RN  VTE Prevention/Management: sequential compression devices on  Goal: Optimal Comfort and Wellbeing  Outcome: Ongoing, Progressing  Intervention: Provide Person-Centered Care  Recent Flowsheet Documentation  Taken 1/17/2021 2000 by Lesli Bishop RN  Trust Relationship/Rapport:   care explained   questions answered   thoughts/feelings acknowledged  Goal: Readiness for Transition of Care  Outcome: Ongoing, Progressing   Goal Outcome Evaluation:  Plan of Care Reviewed With: patient  Progress: improving  Outcome Summary: VSS. Room air. Patient asleep majority of shift. He still claims to feel much better than when he came in. No complaints at this time.

## 2021-01-18 NOTE — DISCHARGE SUMMARY
[facility] Medicine Services  DISCHARGE SUMMARY    Patient Name: Shlomo Mcallister  : 1950  MRN: [id number]    Date of Admission: 2021  2:09 PM  Date of Discharge:  21  Primary Care Physician: Provider, No Known    Consults     No orders found from 2020 to 2021.          Hospital Course     Presenting Problem:   Hyperglycemia [R73.9]  Hyperglycemia [R73.9]  DKA (diabetic ketoacidoses) (CMS/[facility]) [E11.10]    Active Hospital Problems    Diagnosis  POA   • **DKA (diabetic ketoacidoses) (CMS/[facility]) [E11.10]  Yes     Priority: Medium   • Chronic congestive heart failure (CMS/[facility]) [I50.9]  Yes   • Anemia, chronic disease [D63.8]  Yes   • DM2 (diabetes mellitus, type 2) (CMS/[facility]) [E11.9]  Yes   • Prostate cancer metastatic to bone (CMS/[facility]) [C61, C79.51]  Yes   • Essential hypertension [I10]  Yes   • HLD (hyperlipidemia) [E78.5]  Yes   • CAD (coronary artery disease) [I25.10]  Yes      Resolved Hospital Problems   No resolved problems to display.          Hospital Course:  Shlomo Mcallister is a 70 y.o. 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  He presented to the emergency room with complaints of nausea and vomiting for the previous 4 days.  He is also had some increasing weakness.  During this time of nausea vomiting he was not taking his U500 insulin because he did not feel well.  He also admits to being depressed as he wants to get back to his home in [place], however due to some social issues is currently stuck in [place] with some family.  Evaluation revealed DKA with a significant anion gap and a glucose of greater than 500.  He was placed on a insulin drip with close monitoring and anion gap closed.  As sugars improved his nausea and vomiting also resolved.  This morning he has  a tolerating a diet for the last 18 hours or so and feels better.  Seen by physical and Occupational Therapy and is felt safe to be discharged back to home.  Of note he is undergoing a chemotherapy for his metastatic prostate cancer and apparently has an appointment later this week at [facility] for additional chemo which he will keep.  We discussed the importance of continuing his insulin at home and he voiced understanding.      Day of Discharge     HPI:   Feels much better than admission.  Is now tolerating diet with no nausea or vomiting.  Glucose starting to trend back up again.  Did well with therapy and feels comfortable going home.    Review of Systems  Gen- No fevers, chills  CV- No chest pain, palpitations  Resp- No cough, dyspnea  GI- No N/V/D, abd pain    Vital Signs:   Temp:  [97.6 °F (36.4 °C)-98.7 °F (37.1 °C)] 98.2 °F (36.8 °C)  Heart Rate:  [69-84] 84  Resp:  [16-18] 18  BP: (146-177)/(69-84) 177/82     Physical Exam:  Constitutional: No acute distress, awake, alert, chronically ill-appearing  HENT: NCAT, mucous membranes moist  Respiratory: Clear to auscultation bilaterally, respiratory effort normal on room air  Cardiovascular: RRR, no murmurs, rubs, or gallops, port in right chest  Gastrointestinal: Obese, positive bowel sounds, soft, nontender, nondistended  Musculoskeletal: No bilateral ankle edema  Psychiatric: Appropriate affect, cooperative  Neurologic: Oriented x 3, no focal deficits  Skin: No rashes      Pertinent  and/or Most Recent Results     LAB RESULTS:      Lab 01/16/21  2251 01/16/21  1502   WBC 4.43 4.29   HEMOGLOBIN 9.8* 10.1*   HEMATOCRIT 33.8* 34.5*   PLATELETS 231 220   NEUTROS ABS 2.86 2.91   IMMATURE GRANS (ABS) 0.06* 0.04   LYMPHS ABS 1.02 0.91   MONOS ABS 0.43 0.37   EOS ABS 0.03 0.04   .5* 98.6*         Lab 01/17/21  0422 01/16/21  2251 01/16/21  1502   SODIUM 139 131* 134*   POTASSIUM 3.7 3.8 4.4   CHLORIDE 104 94* 93*   CO2 25.0 18.0* 21.0*   ANION GAP  10.0 19.0* 20.0*   BUN 12 15 14   CREATININE 0.68* 0.80 0.86   GLUCOSE 144* 386* 414*   CALCIUM 8.5* 8.7 9.2   MAGNESIUM 1.9 2.1  --    PHOSPHORUS 1.8* 2.6  --    HEMOGLOBIN A1C  --   --  9.50*         Lab 01/16/21  2251 01/16/21  1502   TOTAL PROTEIN 7.6 8.2   ALBUMIN 2.80* 3.40*   GLOBULIN 4.8 4.8   ALT (SGPT) 8 6   AST (SGOT) 22 27   BILIRUBIN 0.3 0.3   ALK PHOS 536* 574*         Lab 01/16/21  1502   PROBNP 5,110.0*   TROPONIN T 0.015                 Lab 01/16/21  1705   PH, ARTERIAL 7.391   PCO2, ARTERIAL 34.9*   PO2 ART 67.5*   FIO2 21   HCO3 ART 21.2   BASE EXCESS ART -3.3*   CARBOXYHEMOGLOBIN 1.4     Brief Urine Lab Results  (Last result in the past 365 days)      Color   Clarity   Blood   Leuk Est   Nitrite   Protein   CREAT   Urine HCG        01/16/21 1859 Yellow Cloudy Small (1+) Negative Negative Negative             Microbiology Results (last 10 days)     Procedure Component Value - Date/Time    COVID PRE-OP / PRE-PROCEDURE SCREENING ORDER (NO ISOLATION) - Swab, Nasopharynx [[id number]]  (Normal) Collected: 01/16/21 1958    Lab Status: Final result Specimen: Swab from Nasopharynx Updated: 01/16/21 2111    Narrative:      The following orders were created for panel order COVID PRE-OP / PRE-PROCEDURE SCREENING ORDER (NO ISOLATION) - Swab, Nasopharynx.  Procedure                               Abnormality         Status                     ---------                               -----------         ------                     COVID-19 and FLU A/B PCR...[[id number]]  Normal              Final result                 Please view results for these tests on the individual orders.    COVID-19 and FLU A/B PCR - Swab, Nasopharynx [[id number]]  (Normal) Collected: 01/16/21 1958    Lab Status: Final result Specimen: Swab from Nasopharynx Updated: 01/16/21 2111     COVID19 Not Detected     Influenza A PCR Not Detected     Influenza B PCR Not Detected    Narrative:      Fact sheet for providers:  https://www.fda.gov/media/383846/download    Fact sheet for patients: https://www.fda.gov/media/274024/download    Test performed by PCR.          Xr Chest 1 View    Result Date: 1/16/2021   EXAMINATION: XR CHEST, SINGLE VIEW - 01/16/2021  INDICATION: Weakness, difficulty walking.  COMPARISON: None.  FINDINGS: Portable chest reveals heart to be enlarged. Underlying chronic changes within the lung fields. Elevation of the right hemidiaphragm. Portacatheter on the right tip in the SVC. Atelectatic changes in the lung bases with no superimposed infectious process. Degenerative changes seen within the spine.         Lung fields are grossly clear with no superimposed infectious process. Portacatheter remains on the right with tip in the SVC. Lung volumes are low.  DICTATED:   01/16/2021 EDITED/ls :   01/16/2021           Discharge Details        Discharge Medications      New Medications      Instructions Start Date   HUMULIN R 500 UNIT/ML CONCENTRATED injection  Generic drug: insulin regular   Per home regimen         Continue These Medications      Instructions Start Date   calcium citrate-vitamin d 200-250 MG-UNIT tablet tablet  Commonly known as: CITRACAL   Oral, Daily      carvedilol 6.25 MG tablet  Commonly known as: COREG   6.25 mg, Oral, Daily      DULoxetine 60 MG capsule  Commonly known as: CYMBALTA   60 mg, Oral, Daily      fish oil 1000 MG capsule capsule   Oral, Daily With Breakfast      furosemide 20 MG tablet  Commonly known as: LASIX   20 mg, Oral, Daily      gabapentin 400 MG capsule  Commonly known as: NEURONTIN   400 mg, Oral, 3 Times Daily      isosorbide mononitrate 60 MG 24 hr tablet  Commonly known as: IMDUR   60 mg, Oral, Daily      metFORMIN 500 MG tablet  Commonly known as: GLUCOPHAGE   500 mg, Oral, Daily With Dinner      multivitamin with minerals tablet tablet   1 tablet, Oral, Daily      oxyCODONE 15 MG immediate release tablet  Commonly known as: ROXICODONE   15 mg, Oral, Every 4 Hours  PRN      pantoprazole 40 MG EC tablet  Commonly known as: PROTONIX   40 mg, Oral, Daily      pravastatin 40 MG tablet  Commonly known as: PRAVACHOL   40 mg, Oral, Daily      rivaroxaban 20 MG tablet  Commonly known as: XARELTO   20 mg, Oral, Daily      Xtampza ER 13.5 MG capsule extended-release 12 hour   Generic drug: oxyCODONE ER   13.5 mg, Oral, 2 times daily             Allergies   Allergen Reactions   • Uroxatral [Alfuzosin Hcl Er] GI Intolerance         Discharge Disposition:  Home or Self Care    Diet:  Hospital:  Diet Order   Procedures   • Diet Regular; Cardiac, Consistent Carbohydrate       Activity:  Activity Instructions     Activity as Tolerated               CODE STATUS:    Code Status and Medical Interventions:   Ordered at: 01/16/21 2100     Level Of Support Discussed With:    Patient     Code Status:    CPR     Medical Interventions (Level of Support Prior to Arrest):    Full       No future appointments.    Additional Instructions for the Follow-ups that You Need to Schedule     Discharge Follow-up with PCP   As directed       Currently Documented PCP:    Provider, No Known    PCP Phone Number:    None     Follow Up Details: PCP within 1 week               Shant Segundo MD  01/18/21      Time Spent on Discharge:  I spent 32 minutes on this discharge activity which included: face-to-face encounter with the patient, reviewing the data in the system, coordination of the care with the nursing staff as well as consultants, documentation, and entering orders.

## 2021-01-18 NOTE — PROGRESS NOTES
Discharge Planning Assessment  [facility]     Patient Name: Shlomo Mcallister  MRN: [id number]  Today's Date: 1/18/2021    Admit Date: 1/16/2021    Discharge Needs Assessment     Row Name 01/18/21 1032       Living Environment    Lives With  spouse pt staying with family in [facility], resides in [place]    Unique Family Situation  has been in [place] since [place], wife broke her foot and can't drive back home. Pt does not drive    Current Living Arrangements  home/apartment/condo    Primary Care Provided by  self    Provides Primary Care For  no one, unable/limited ability to care for self    Family Caregiver if Needed  spouse;child(mera), adult    Family Caregiver Names  wife- Rosa and son Prakash    Quality of Family Relationships  helpful;involved;supportive    Able to Return to Prior Arrangements  yes    Living Arrangement Comments  pt and wife plan to return to [place] once both well for travel       Resource/Environmental Concerns    Resource/Environmental Concerns  none       Transition Planning    Patient/Family Anticipates Transition to  home with family    Patient/Family Anticipated Services at Transition  none    Transportation Anticipated  family or friend will provide       Discharge Needs Assessment    Readmission Within the Last 30 Days  no previous admission in last 30 days    Equipment Currently Used at Home  rollator    Concerns to be Addressed  discharge planning;denies needs/concerns at this time    Anticipated Changes Related to Illness  none    Equipment Needed After Discharge  none    Provided Post Acute Provider List?  N/A    Provided Post Acute Provider Quality & Resource List?  N/A        Discharge Plan     Row Name 01/18/21 1035       Plan    Plan  home    Patient/Family in Agreement with Plan  yes    Plan Comments  CM spoke with pt at bedside. Pt resides in [place] however came to [place] to visit family at [place] and has been unable to return home. Pt reports his  wife Rosa proke her foot and she drives as he is unable to see so until she is better they will continue to stay with family here. Pt reports he does have a PCP in [place] and he has Chemo treatments which he has not been receiving. Per MD notes pt will have [facility] Wednesday [facility], pt reports he is unsure. Pt was ambulatory with therapy services and used RW, recs for home with assistance. Pt reports he has a rollator he uses and will have assistance form his wife and family as needed. Pt plans to return home today and son Prakash will provide private transportation. Pt denies discharge needs at this time. CM will continue to follow.    Final Discharge Disposition Code  01 - home or self-care        Continued Care and Services - Admitted Since 1/16/2021    Coordination has not been started for this encounter.       Expected Discharge Date and Time     Expected Discharge Date Expected Discharge Time    Jan 18, 2021         Demographic Summary     Row Name 01/18/21 1031       General Information    Referral Source  admission list    Reason for Consult  discharge planning    Preferred Language  English     Used During This Interaction  no    General Information Comments  PCP- in [facility]       Contact Information    Permission Granted to Share Info With          Functional Status     Row Name 01/18/21 1031       Functional Status    Usual Activity Tolerance  good    Current Activity Tolerance  moderate       Functional Status, IADL    Medications  independent    Meal Preparation  assistive equipment and person    Housekeeping  assistive equipment and person    Laundry  assistive equipment and person    Shopping  assistive equipment and person       Mental Status Summary    Recent Changes in Mental Status/Cognitive Functioning  no changes       Employment/    Employment/ Comments  Pt confirms he has [employer] Medicare, denies concerns or disruption in  coverage. Pt has prescription drug coverage and denies issues obtaining or affording current medications.        Psychosocial    No documentation.       Abuse/Neglect    No documentation.       Legal    No documentation.       Substance Abuse    No documentation.       Patient Forms    No documentation.           Elissa Jones RN

## 2021-01-19 PROBLEM — R55 SYNCOPE: Status: ACTIVE | Noted: 2021-01-01

## 2021-01-19 PROBLEM — Z86.718 HX OF BLOOD CLOTS: Status: ACTIVE | Noted: 2021-01-01

## 2021-01-19 PROBLEM — R79.89 ELEVATED TSH: Status: ACTIVE | Noted: 2021-01-01

## 2021-01-19 PROBLEM — I21.4 NSTEMI (NON-ST ELEVATED MYOCARDIAL INFARCTION) (HCC): Status: ACTIVE | Noted: 2021-01-01

## 2021-01-19 PROBLEM — H10.9 BILATERAL CONJUNCTIVITIS: Status: ACTIVE | Noted: 2021-01-01

## 2021-01-19 PROBLEM — S91.309A OPEN WOUND OF FOOT: Status: ACTIVE | Noted: 2021-01-01

## 2021-01-19 NOTE — ED PROVIDER NOTES
"Subjective   70-year-old male with extensive past medical history, most notably coronary artery disease, CHF, diabetes, and pancreatic cancer, presents for evaluation following syncopal episode.  The patient states that he is being treated for his pancreatic cancer by an oncologist in [place].  He splits his time between [place] and [place].  Just prior to coming to the emergency department today via EMS, the patient had an episode where he \"passed out.\"  He states that he felt lightheaded and dizzy before the syncopal episode and that his wife found him lying on the floor.  On EMS arrival, the patient was noted to be hypotensive.  He was given IV fluids and began complaining of some left-sided chest pain on the way to our facility.  He denies any active chest pain at this time.  No shortness of breath.  No palpitations.          Review of Systems   Cardiovascular: Positive for chest pain.   Neurological: Positive for syncope.   All other systems reviewed and are negative.      Past Medical History:   Diagnosis Date   • Anemia 1/16/2021   • CAD (coronary artery disease) 1/16/2021   • CHF (congestive heart failure) (CMS/[facility]) 1/16/2021   • DM2 (diabetes mellitus, type 2) (CMS/[facility]) 1/16/2021   • DVT (deep venous thrombosis) (CMS/[facility])    • GERD without esophagitis 1/16/2021   • HLD (hyperlipidemia) 1/16/2021       Allergies   Allergen Reactions   • Uroxatral [Alfuzosin Hcl Er] GI Intolerance       Past Surgical History:   Procedure Laterality Date   • IL REANOMAL CORON ART PA ORIGIN BY GRAFT     • TURP / TRANSURETHRAL INCISION / DRAINAGE PROSTATE         History reviewed. No pertinent family history.    Social History     Socioeconomic History   • Marital status:      Spouse name: Not on file   • Number of children: Not on file   • Years of education: Not on file   • Highest education level: Not on file   Tobacco Use   • Smoking status: Former Smoker   • Smokeless tobacco: Never Used   Substance and Sexual " Activity   • Alcohol use: Not Currently   • Drug use: Never   • Sexual activity: Defer   Social History Narrative    Patient lives in [place] with Wife, she is his primary caregiver           Objective   Physical Exam  Vitals signs and nursing note reviewed.   Constitutional:       General: He is not in acute distress.     Appearance: He is well-developed. He is not diaphoretic.      Comments: Nontoxic-appearing male   HENT:      Head: Normocephalic and atraumatic.   Eyes:      Extraocular Movements: Extraocular movements intact.      Pupils: Pupils are equal, round, and reactive to light.   Neck:      Vascular: No JVD.      Comments: No midline cervical spine tenderness, no step-off or deformity noted  Cardiovascular:      Rate and Rhythm: Normal rate and regular rhythm.      Heart sounds: Normal heart sounds. No murmur. No friction rub. No gallop.    Pulmonary:      Effort: Pulmonary effort is normal. No respiratory distress.      Breath sounds: Normal breath sounds. No wheezing or rales.   Abdominal:      General: Bowel sounds are normal. There is no distension.      Palpations: Abdomen is soft. There is no mass.      Tenderness: There is no abdominal tenderness. There is no guarding.   Musculoskeletal: Normal range of motion.      Right lower leg: No edema.      Left lower leg: No edema.   Skin:     General: Skin is warm and dry.      Coloration: Skin is not pale.      Findings: No erythema or rash.   Neurological:      General: No focal deficit present.      Mental Status: He is alert and oriented to person, place, and time.      Comments: Awake, alert, and oriented x3, clear and fluent speech, no ataxia or dysmetria, neurovascularly intact distally in all fours with bounding distal pulses and normal sensation, 5 out of 5 strength in all fours, no cranial nerve deficits noted with cranial nerves II through XII grossly intact   Psychiatric:         Mood and Affect: Mood normal.         Thought Content: Thought  "content normal.         Judgment: Judgment normal.         Procedures           ED Course  ED Course as of Jan 19 1920   Tue Jan 19, 2021   1450 70-year-old male presents for evaluation of chest pain and syncope.  Of note, the patient has a history of pancreatic cancer for which he is followed by an oncologist in [place].  He also has a history of coronary artery disease.  Just prior to calling EMS today, the patient got lightheaded and \"passed out.\"  On EMS arrival, the patient was hypotensive.  He was given IV fluids and then had some chest pain, prompting his visit to the emergency department.  On arrival, the patient is nontoxic-appearing.  We will obtain labs and imaging, and we will reassess following initial interventions.    [DD]   1453 EKG revealed sinus rhythm with occasional PVCs, heart rate of 82, diffuse T wave inversion, and mildly prolonged QTc interval.    [DD]   1545 Initial labs remarkable for potassium of 3.2 and glucose of 58.  Patient given oral potassium replacement as well as orange juice and a boxed lunch.    [DD]   1616 Repeat point-of-care glucose after oral orange juice was 41.  D50 given.    [DD]   1616 Troponin is mildly elevated.      [DD]   1654 SF Syncope +.    [DD]   1804 Upon reevaluation, the patient's blood sugar has normalized.  He is now normotensive.    [DD]   1805 I discussed the patient's case with Dr. Salazar, the patient will be admitted under his care for further evaluation and treatment.  The patient is aware/agreeable with the plan at this time.    [DD]   1811 HEART score of 5.    [DD]   1818 CT head negative for acute/emergent intracranial process.  Chest CTA negative for pulmonary embolism.  Given clinical correlation, the patient does not have pneumonia.    [DD]      ED Course User Index  [DD] Prakash Youssef MD                                   Recent Results (from the past 24 hour(s))   Troponin    Collection Time: 01/19/21  2:51 PM    Specimen: Blood   Result " Value Ref Range    Troponin T 0.047 (C) 0.000 - 0.030 ng/mL   Comprehensive Metabolic Panel    Collection Time: 01/19/21  2:51 PM    Specimen: Blood   Result Value Ref Range    Glucose 58 (L) 65 - 99 mg/dL    BUN 12 8 - 23 mg/dL    Creatinine 1.18 0.76 - 1.27 mg/dL    Sodium 141 136 - 145 mmol/L    Potassium 3.2 (L) 3.5 - 5.2 mmol/L    Chloride 103 98 - 107 mmol/L    CO2 25.0 22.0 - 29.0 mmol/L    Calcium 9.1 8.6 - 10.5 mg/dL    Total Protein 7.6 6.0 - 8.5 g/dL    Albumin 3.50 3.50 - 5.20 g/dL    ALT (SGPT) 7 1 - 41 U/L    AST (SGOT) 34 1 - 40 U/L    Alkaline Phosphatase 696 (H) 39 - 117 U/L    Total Bilirubin 0.3 0.0 - 1.2 mg/dL    eGFR Non African Amer 61 >60 mL/min/1.73    Globulin 4.1 gm/dL    A/G Ratio 0.9 g/dL    BUN/Creatinine Ratio 10.2 7.0 - 25.0    Anion Gap 13.0 5.0 - 15.0 mmol/L   Lipase    Collection Time: 01/19/21  2:51 PM    Specimen: Blood   Result Value Ref Range    Lipase 19 13 - 60 U/L   BNP    Collection Time: 01/19/21  2:51 PM    Specimen: Blood   Result Value Ref Range    proBNP 6,864.0 (H) 0.0 - 900.0 pg/mL   Light Blue Top    Collection Time: 01/19/21  2:51 PM   Result Value Ref Range    Extra Tube hold for add-on    Green Top (Gel)    Collection Time: 01/19/21  2:51 PM   Result Value Ref Range    Extra Tube Hold for add-ons.    Lavender Top    Collection Time: 01/19/21  2:51 PM   Result Value Ref Range    Extra Tube hold for add-on    Gold Top - SST    Collection Time: 01/19/21  2:51 PM   Result Value Ref Range    Extra Tube Hold for add-ons.    CBC Auto Differential    Collection Time: 01/19/21  2:51 PM    Specimen: Blood   Result Value Ref Range    WBC 6.26 3.40 - 10.80 10*3/mm3    RBC 3.57 (L) 4.14 - 5.80 10*6/mm3    Hemoglobin 10.6 (L) 13.0 - 17.7 g/dL    Hematocrit 34.6 (L) 37.5 - 51.0 %    MCV 96.9 79.0 - 97.0 fL    MCH 29.7 26.6 - 33.0 pg    MCHC 30.6 (L) 31.5 - 35.7 g/dL    RDW 20.1 (H) 12.3 - 15.4 %    RDW-SD 70.5 (H) 37.0 - 54.0 fl    MPV 10.1 6.0 - 12.0 fL    Platelets 252 140 -  450 10*3/mm3    Neutrophil % 56.5 42.7 - 76.0 %    Lymphocyte % 24.3 19.6 - 45.3 %    Monocyte % 13.9 (H) 5.0 - 12.0 %    Eosinophil % 1.9 0.3 - 6.2 %    Basophil % 0.8 0.0 - 1.5 %    Immature Grans % 2.6 (H) 0.0 - 0.5 %    Neutrophils, Absolute 3.54 1.70 - 7.00 10*3/mm3    Lymphocytes, Absolute 1.52 0.70 - 3.10 10*3/mm3    Monocytes, Absolute 0.87 0.10 - 0.90 10*3/mm3    Eosinophils, Absolute 0.12 0.00 - 0.40 10*3/mm3    Basophils, Absolute 0.05 0.00 - 0.20 10*3/mm3    Immature Grans, Absolute 0.16 (H) 0.00 - 0.05 10*3/mm3    nRBC 0.3 (H) 0.0 - 0.2 /100 WBC   T4, Free    Collection Time: 01/19/21  2:51 PM    Specimen: Blood   Result Value Ref Range    Free T4 1.41 0.93 - 1.70 ng/dL   Magnesium    Collection Time: 01/19/21  2:51 PM    Specimen: Blood   Result Value Ref Range    Magnesium 2.0 1.6 - 2.4 mg/dL   TSH    Collection Time: 01/19/21  2:51 PM    Specimen: Blood   Result Value Ref Range    TSH 6.250 (H) 0.270 - 4.200 uIU/mL   Scan Slide    Collection Time: 01/19/21  2:51 PM    Specimen: Blood   Result Value Ref Range    Macrocytes Slight/1+ None Seen    WBC Morphology Normal Normal    Platelet Morphology Normal Normal   POC Glucose Once    Collection Time: 01/19/21  3:59 PM    Specimen: Blood   Result Value Ref Range    Glucose 41 (C) 70 - 130 mg/dL   POC Glucose Once    Collection Time: 01/19/21  4:16 PM    Specimen: Blood   Result Value Ref Range    Glucose 51 (L) 70 - 130 mg/dL   Protime-INR    Collection Time: 01/19/21  4:35 PM    Specimen: Blood   Result Value Ref Range    Protime 17.1 (H) 11.5 - 14.0 Seconds    INR 1.43 (H) 0.85 - 1.16   POC Glucose Once    Collection Time: 01/19/21  4:52 PM    Specimen: Blood   Result Value Ref Range    Glucose 181 (H) 70 - 130 mg/dL     Note: In addition to lab results from this visit, the labs listed above may include labs taken at another facility or during a different encounter within the last 24 hours. Please correlate lab times with ED admission and discharge  times for further clarification of the services performed during this visit.    CT Angiogram Chest   Preliminary Result   1. No PE.   2. Opacifications at the left lung base of atelectasis versus airspace   disease adjacent to a trace volume left pleural effusion.   3. Cholelithiasis.              CT Head Without Contrast   Preliminary Result   No acute intracranial finding specifically no midline shift   or hydrocephalus and no intra-axial hemorrhage with prominence of the   sulci in the mesial temporal regions concerning for cerebral atrophy or   potential early neurodegenerative processes given mild to moderate   chronic small vessel ischemic disease additionally noted in the   supratentorial white matter                Vitals:    01/19/21 1730 01/19/21 1731 01/19/21 1800 01/19/21 1907   BP: 118/66  123/60 137/70   BP Location:    Right arm   Patient Position:    Lying   Pulse:  73 74 75   Resp:    18   Temp:    98.5 °F (36.9 °C)   TempSrc:    Oral   SpO2:  100% 99% 100%   Weight:    98.9 kg (218 lb)   Height:         Medications   sodium chloride 0.9 % flush 10 mL (has no administration in time range)   aspirin chewable tablet 324 mg (324 mg Oral Not Given 1/19/21 1441)   sodium chloride 0.9 % bolus 1,000 mL (0 mL Intravenous Stopped 1/19/21 1606)   potassium chloride (MICRO-K) CR capsule 20 mEq (20 mEq Oral Given 1/19/21 1648)   dextrose (D50W) 25 g/ 50mL Intravenous Solution 25 g (25 g Intravenous Given 1/19/21 1622)   iopamidol (ISOVUE-370) 76 % injection 100 mL (59 mL Intravenous Given 1/19/21 1746)     ECG/EMG Results (last 24 hours)     Procedure Component Value Units Date/Time    ECG 12 Lead [[id number]] Collected: 01/19/21 1438     Updated: 01/19/21 1454    ECG 12 Lead [[id number]] Collected: 01/19/21 1440     Updated: 01/19/21 1455        ECG 12 Lead         ECG 12 Lead         ECG 12 Lead    (Results Pending)               MDM    Final diagnoses:   Syncope, unspecified syncope type   Hypoglycemia    History of pancreatic cancer   Chest pain, unspecified type   History of coronary artery disease   Elevated troponin            Prakash Youssef MD  01/19/21 1920

## 2021-01-19 NOTE — PROGRESS NOTES
Discharge Planning Assessment  [facility]     Patient Name: Shlomo Mcallister  MRN: [id number]  Today's Date: 1/19/2021    Admit Date: 1/19/2021    Discharge Needs Assessment     Row Name 01/19/21 1840       Living Environment    Lives With  spouse    Name(s) of Who Lives With Patient  Rosa    Unique Family Situation  Lives between [facility] and [place]    Current Living Arrangements  home/apartment/condo    Potentially Unsafe Housing Conditions  unable to assess    Primary Care Provided by  self    Provides Primary Care For  no one, unable/limited ability to care for self    Family Caregiver if Needed  spouse    Family Caregiver Names  Rosa    Quality of Family Relationships  helpful;involved;supportive    Able to Return to Prior Arrangements  yes       Resource/Environmental Concerns    Resource/Environmental Concerns  none    Transportation Concerns  car, none       Transition Planning    Patient/Family Anticipates Transition to  home with family    Patient/Family Anticipated Services at Transition  none    Transportation Anticipated  family or friend will provide       Discharge Needs Assessment    Readmission Within the Last 30 Days  current reason for admission unrelated to previous admission    Equipment Currently Used at Home  rollator    Concerns to be Addressed  denies needs/concerns at this time    Anticipated Changes Related to Illness  none    Equipment Needed After Discharge  none    Provided Post Acute Provider List?  N/A    Provided Post Acute Provider Quality & Resource List?  N/A    Current Discharge Risk  chronically ill        Discharge Plan     Row Name 01/19/21 8799       Plan    Plan  initial    Plan Comments  Pt is currently living with his wife in [place]. PCP is in [facility]. Recieving chemo for metastatic prostate cancer. Plan is home when ready        Continued Care and Services - Admitted Since 1/19/2021    Coordination has not been started for this encounter.         Demographic Summary    No  documentation.       Functional Status     Row Name 01/19/21 1839       Functional Status    Usual Activity Tolerance  good       Functional Status, IADL    Medications  independent    Meal Preparation  assistive equipment and person    Housekeeping  assistive equipment and person    Laundry  assistive equipment and person    Shopping  assistive equipment and person       Mental Status    General Appearance WDL  WDL       Mental Status Summary    Recent Changes in Mental Status/Cognitive Functioning  no changes       Employment/    Employment Status  disabled        Psychosocial    No documentation.       Abuse/Neglect    No documentation.       Legal    No documentation.       Substance Abuse    No documentation.       Patient Forms    No documentation.           Maria Teresa La RN

## 2021-01-19 NOTE — OUTREACH NOTE
Prep Survey      Responses   Denominational facility patient discharged from?  [facility]   Is LACE score < 7 ?  No   Emergency Room discharge w/ pulse ox?  No   Eligibility  Readm Mgmt   Discharge diagnosis  DKA   Does the patient have one of the following disease processes/diagnoses(primary or secondary)?  Other   Does the patient have Home health ordered?  No   Is there a DME ordered?  No   Comments regarding appointments  call for apmts   Prep survey completed?  Yes          Rachael Angeles RN

## 2021-01-20 NOTE — PROGRESS NOTES
"Pharmacy Consult-Vancomycin Dosing  Shlomo Mcallister is a  70 y.o. male receiving vancomycin therapy.     Indication: pneumonia  Consulting Provider: hospitalist  ID Consult: No    Goal AUC: 400 - 600 mg/L*hr    Current Antimicrobial Therapy  Anti-Infectives (From admission, onward)      Ordered     Dose/Rate Route Frequency Start Stop    01/19/21 2106  piperacillin-tazobactam (ZOSYN) 3.375 g in iso-osmotic dextrose 50 ml (premix)     Ordering Provider: Malathi Copeland PA-C    3.375 g  over 4 Hours Intravenous Every 8 Hours 01/20/21 0345 01/25/21 0344 01/19/21 2106  piperacillin-tazobactam (ZOSYN) 3.375 g in iso-osmotic dextrose 50 ml (premix)     Ordering Provider: Malathi Copeland PA-C    3.375 g  over 30 Minutes Intravenous Once 01/19/21 2200 01/19/21 2106  vancomycin 2000 mg/500 mL 0.9% NS IVPB ([facility])     Ordering Provider: Malathi Copeland PA-C    20 mg/kg × 98.9 kg Intravenous Once 01/19/21 2200 01/19/21 2106  Pharmacy to dose vancomycin     Ordering Provider: Malathi Copeland PA-C     Does not apply Continuous PRN 01/19/21 2058 01/24/21 2057            Allergies  Allergies as of 01/19/2021 - Reviewed 01/19/2021   Allergen Reaction Noted    Uroxatral [alfuzosin hcl er] GI Intolerance 01/16/2021       Labs  Results from last 7 days   Lab Units 01/19/21  1451 01/18/21  0718 01/17/21  0422   BUN mg/dL 12 10 12   CREATININE mg/dL 1.18 0.83 0.68*     Results from last 7 days   Lab Units 01/19/21  1451 01/16/21  2251 01/16/21  1502   WBC 10*3/mm3 6.26 4.43 4.29       Evaluation of Dosing  Last Dose Received in the ED/Outside Facility:               N/A  Is Patient on Dialysis or Renal Replacement:               No    Ht - 167.6 cm (66\")  Wt - 98.9 kg (218 lb)    Estimated Creatinine Clearance: 65.9 mL/min (by C-G formula based on SCr of 1.18 mg/dL).  Intake & Output (last 3 days)       None            Microbiology and Radiology  Microbiology Results (last 10 days)       Procedure Component Value - " Date/Time    COVID PRE-OP / PRE-PROCEDURE SCREENING ORDER (NO ISOLATION) - Swab, Nasopharynx [[id number]]  (Normal) Collected: 01/16/21 1958    Lab Status: Final result Specimen: Swab from Nasopharynx Updated: 01/16/21 2111    Narrative:      The following orders were created for panel order COVID PRE-OP / PRE-PROCEDURE SCREENING ORDER (NO ISOLATION) - Swab, Nasopharynx.  Procedure                               Abnormality         Status                     ---------                               -----------         ------                     COVID-19 and FLU A/B PCR...[[id number]]  Normal              Final result                 Please view results for these tests on the individual orders.    COVID-19 and FLU A/B PCR - Swab, Nasopharynx [[id number]]  (Normal) Collected: 01/16/21 1958    Lab Status: Final result Specimen: Swab from Nasopharynx Updated: 01/16/21 2111     COVID19 Not Detected     Influenza A PCR Not Detected     Influenza B PCR Not Detected    Narrative:      Fact sheet for providers: https://www.fda.gov/media/680968/download    Fact sheet for patients: https://www.fda.gov/media/552452/download    Test performed by PCR.            Reported Vancomycin Levels                Insight[employer] AUC Calculation:  Current AUC:   -- mg/L*hr    Predicted Steady State AUC on Current Dose: -- mg/L*hr  _________________________________  Predicted Steady State AUC on New Dose:   412 mg/L*hr (Vanc 1500 mg IV q24)    Assessment/Plan:  1. Will give vancomycin 2000 mg IV once                               followed by      Vancomycin 1500 mg IV q 24 hours    2. Vancomycin trough is scheduled 1/21 at 2030 prior to the 3rd dose. Predicted Steady State AUC at current dose: 412 mg/L*hr.      Kaveh Dennis, PharmD, BCPS  1/19/2021  21:10 EST

## 2021-01-20 NOTE — PLAN OF CARE
VSS. Pt complained of 3/10 left chest pain upon arrival to the floor. Latest chest pain is 1/10. 2L NC. Gauze wrap on Bilateral feet. NPO.    Goal Outcome Evaluation:     Problem: Fall Injury Risk  Goal: Absence of Fall and Fall-Related Injury  Outcome: Ongoing, Progressing  Intervention: Identify and Manage Contributors to Fall Injury Risk  Recent Flowsheet Documentation  Taken 1/19/2021 2000 by Jasen Taylor, RN  Medication Review/Management: medications reviewed  Intervention: Promote Injury-Free Environment  Recent Flowsheet Documentation  Taken 1/20/2021 0400 by Jasen Taylor, RN  Safety Promotion/Fall Prevention:   activity supervised   assistive device/personal items within reach  Taken 1/20/2021 0200 by Jasen Taylor RN  Safety Promotion/Fall Prevention:   activity supervised   assistive device/personal items within reach  Taken 1/20/2021 0000 by Jasen Taylor, RN  Safety Promotion/Fall Prevention:   activity supervised   assistive device/personal items within reach  Taken 1/19/2021 2200 by Jasen Taylor, RN  Safety Promotion/Fall Prevention:   activity supervised   assistive device/personal items within reach  Taken 1/19/2021 2000 by Jasen Taylor, RN  Safety Promotion/Fall Prevention:   activity supervised   assistive device/personal items within reach

## 2021-01-20 NOTE — NURSING NOTE
"wocn consulted for ulcerations to bilateral 1st and 2nd toes.     1st and second toes of right foot present almost identical to same digits on left foot. Ulcerations oare scabbed vs. Necrotic dry and black. Current treatment is painting with betadine and leaving open to air per podiatrist at [facility] in [facility]. Continue this therapy daily.     Surrounding ulcers, all digits involved are swollen and red. Wife says that this is known and no treatments were being done at this time.     Patient stated \" they were using santyl and they stopped. They also wanted to cut my toes of but I said no way!\"    Offloading heel boots applied. patietn and wife educated on the importance of pressure relief of all bony prominences in bed. patient and wife verbalized understanding.     wocn will continue to follow. Please contact with questions or concerns.            "

## 2021-01-20 NOTE — CONSULTS
" Discussed and taught patient about type 2 diabetes self-management, risk factors, and importance of blood glucose control to reduce complications. Target blood glucose readings and A1c goals per ADA were reviewed. Reviewed with patient current A1c 9.5 and discussed its significance. Signs, symptoms, and treatment of hyperglycemia and hypoglycemia were discussed. Lifestyle changes such as physical activity with MD approval and healthy eating were encouraged. Stressed the importance of strict blood sugar control after surgery to prevent complications such as infection and to promote healing of incision. Encouraged pt to monitor blood sugar at home 3-4  times per day and to call PCP if blood sugar is trending sachi. Encouraged to keep record of blood glucose readings to take to follow up appointment with PCP. Provided patient with copy of [employer]'s \"What is Diabetes\" handout, \"Blood Glucose Goals\" handout, and \"What is A1c\" handout. Patient does weara CGM at home, did discuss CGMs and ways to keep CGMs on better as patient reports they often fall off. Thank you for this referral, please re consult as needed.  "

## 2021-01-20 NOTE — CONSULTS
[facility]  Consultation H&P    Patient: Shlomo Mcallister  1950  [address]  [address]  [facility] [address]    PCP:  Provider, No Known   Treatment Team:   Attending Provider: Ligia Carrillo MD  Consulting Physician: Sissy Delong MD  Admitting Provider: Ligia Carrillo MD   1/19/2021      DATE OF CONSULTATION: 1/20/2021 11:53 EST     IDENTIFICATION: A 70 y.o. male, splits time between [place], [place] with his family and [place], [place]    REASON FOR CONSULTATION: syncope    PROBLEM LIST:    Syncope    Anemia, chronic disease    DM2 (diabetes mellitus, type 2) (CMS/HCC)    Prostate cancer metastatic to bone (CMS/HCC)    Essential hypertension    HLD (hyperlipidemia)    GERD without esophagitis    CAD (coronary artery disease)    CHF (congestive heart failure) (CMS/HCC)    Bilateral conjunctivitis    NSTEMI (non-ST elevated myocardial infarction) (CMS/HCC)    Hx of blood clots    Open wound of foot    Elevated TSH    1. Coronary artery disease:  a.  CABG x4, [facility] in [place] in July 2009 -- data deficit.   b. Lexiscan myocardial perfusion study, 02/06/2011:  Positive for anterior and inferior reversible ischemia.  LVEF 51%.  c. Cardiac catheterization, at [facility], February 2011, with 3 out of 4 patent grafts with no ischemic territories noted.   d. Abnormal Cardiolite, 07/23/2012, showing an area of potential anterolateral ischemia and possible inferior scar.  e. Left heart catheterization,  07/30/2012, Dr. Delong, for abnormal stress test:  Fractional flow reserve to the third diagonal branch with stenting using a 2.5 x 8 mm Promus Element drug-eluting stent, PTCA/stent placement of the inferior subsequent of the posterolateral branch  using a 2.5 x 12 mm Promus Element drug-eluting stent.  f. Echocardiogram, 08/13/2012, revealing no evidence of pericardial effusion with normal left ventricular systolic function of 55% to  60%.  g. Atypical chest pain, probable inflammatory.  h. ~2017 [facility] w 2 stents, Dr. Claudia Buenrostro [place], idb  2. Bilateral pulmonary emboli noted following admission, February 2014, for dyspnea and atypical chest  pain:  a. Cardiac catheterization revealed widely patent grafts  and stents at that time.  CT scan per PE protocol was positive for multiple bilateral pulmonary emboli.  Xarelto initiated.    b. Hypercoagulable profile revealed a PSA of 12.7.  Homocysteine left of 13.5 and negative ALISTAIR as well as negative cardiolipin  antibody and negative Antiphospholipid antibody.  No evidence of antithrombin III mutation.  Factor V Leiden negative.  Heterozygote mutation for thrombin gene mutation.        3. Hypertension.   4. Hyperlipidemia, recently taken off Simvastatin due to legs cramping.   5. Diabetes mellitus, type 2, poorly controlled:  a. Peripheral neuropathy.  6. Gastroesophageal reflux disease.  7. Candida esophagitis, resolved with Diflucan.  8. Lower leg swelling.  9. Obstructive sleep apnea.  10. Back pain.  11. Obesity,  BMI on 08/24/2011 is 44.   12. Intermittent renal insufficiency.  13. Prostate cancer with metastases to the pelvis and lower extremities:  a.  Status post biopsy, April 2014.  b. Diagnosis of cancer, May 2014.  c. Unable to tolerate drug trial secondary to hypoglycemia, 2014.  d. Chemotherapy [place], [place] 2020  14.  Surgical history:  a. Circumcision, 1985.  b. Right carpal tunnel  release, 2003.  c. Left carpal tunnel release, 2004.   d. TURP, 2008.   e. Left shoulder surgery x4.  f. Status post bilateral cataract extraction and lens implantation.    Past Medical History:   Diagnosis Date   • Anemia 1/16/2021   • CAD (coronary artery disease) 1/16/2021   • CHF (congestive heart failure) (CMS/HCC) 1/16/2021   • DM2 (diabetes mellitus, type 2) (CMS/[facility]) 1/16/2021   • DVT (deep venous thrombosis) (CMS/[facility])    • GERD without esophagitis 1/16/2021   • HLD (hyperlipidemia) 1/16/2021      Past Surgical History:   Procedure Laterality Date   • OK REANOMAL CORON ART PA ORIGIN BY GRAFT     • TURP / TRANSURETHRAL INCISION / DRAINAGE PROSTATE         Allergies  Allergies   Allergen Reactions   • Uroxatral [Alfuzosin Hcl Er] GI Intolerance       Current Medications    Current Facility-Administered Medications:   •  acetaminophen (TYLENOL) tablet 650 mg, 650 mg, Oral, Q4H PRN **OR** acetaminophen (TYLENOL) 160 MG/5ML solution 650 mg, 650 mg, Oral, Q4H PRN **OR** acetaminophen (TYLENOL) suppository 650 mg, 650 mg, Rectal, Q4H PRN, Malathi Copeland PA-C  •  dextrose (D50W) 25 g/ 50mL Intravenous Solution 25 g, 25 g, Intravenous, Q15 Min PRN, Geraldo Salazar,   •  dextrose (GLUTOSE) oral gel 15 g, 15 g, Oral, Q15 Min PRN, Geraldo Salazar DO  •  glucagon (human recombinant) (GLUCAGEN DIAGNOSTIC) injection 1 mg, 1 mg, Subcutaneous, Q15 Min PRN, Geraldo Salazar DO  •  insulin detemir (LEVEMIR) injection 10 Units, 10 Units, Subcutaneous, Q12H, Geraldo Salazar DO, 10 Units at 01/20/21 0855  •  insulin lispro (humaLOG, ADMELOG) injection 0-9 Units, 0-9 Units, Subcutaneous, 4x Daily With Meals & Nightly, Geraldo Salazar, , 7 Units at 01/20/21 1134  •  nitroglycerin (NITROSTAT) SL tablet 0.4 mg, 0.4 mg, Sublingual, Q5 Min PRN, Malathi Copeland PA-C  •  pantoprazole (PROTONIX) EC tablet 40 mg, 40 mg, Oral, Daily, Malathi Copeland PA-C, 40 mg at 01/20/21 0508  •  potassium chloride (MICRO-K) CR capsule 40 mEq, 40 mEq, Oral, PRN, 40 mEq at 01/19/21 2306 **OR** potassium chloride (KLOR-CON) packet 40 mEq, 40 mEq, Oral, PRN **OR** potassium chloride 10 mEq in 100 mL IVPB, 10 mEq, Intravenous, Q1H PRN, Malathi Copeland PA-C  •  pravastatin (PRAVACHOL) tablet 40 mg, 40 mg, Oral, Daily, Malathi Copeland PA-C, 40 mg at 01/20/21 0855  •  rivaroxaban (XARELTO) tablet 20 mg, 20 mg, Oral, Daily With Breakfast, Malathi Copeland PA-C, 20 mg at 01/20/21 0855  •  sodium chloride 0.9 % flush 10 mL,  "10 mL, Intravenous, PRN, Malathi Copeland PA-C  •  sodium chloride 0.9 % flush 10 mL, 10 mL, Intravenous, Q12H, Malathi Copeland PA-C, 10 mL at 01/19/21 2202  •  sodium chloride 0.9 % flush 10 mL, 10 mL, Intravenous, PRN, Malathi Copeland PA-C  •  trimethoprim-polymyxin b (POLYTRIM) 40409-0.1 UNIT/ML-% ophthalmic solution 1 drop, 1 drop, Both Eyes, Q4H, Malathi Copeland PA-C, 1 drop at 01/20/21 1136       History of Present Illness   Shlomo Mcallister is a 70 y.o. year old male with a past medical history significant for CAD s/p remote CABG, Hx PE on chronic Xarelto, Hx reported CHF, HTN, HLD, T2DM, MARIAH, back pain, obseity, GERD, and metastatic prostate cancer currently undergoing chemo who presented to the [facility] ED yesterday 1/19/2021 following a syncopal episode.    He was recently admitted at [facility] from 1/16/2021 through 1/18/2021 for DKA and had no cardiac complaints at that time.    The pt reports when he woke up yesterday morning he felt weak and \"off\".  He got up out of bed and was walking to the kitchen to get something to eat when he noticed 1 dog and had an accident on the floor.  He bent over to clean it up, and is unsure if he lost consciousness before or after falling and hitting the floor.  He came to relatively quickly and states he felt nauseated and weak.  His wife then arrived home from a doctor's appointment, helped him get up into the chair, and asked if he wanted to go to the hospital, to which he replied heat did not want to go.  About 20 minutes after this he then began experiencing severe 8 out of 10 sharp left-sided chest pain associated with nausea.  The patient's wife states that he looked very pale.  She called 911.  When EMS arrived his BP was reportedly low, and BG reportedly in the 50s.  Upon further discussion, the patient reports that he has had chest pain off and on over the last year.  He states the sharp character pain is identical to what he is experienced prior to his CABG in 2009, and " several heart caths requiring stents since.  He reports his cardiologist in [place] had planned on him having an outpatient stress test this month, but he will be staying in [place] for several more weeks.  His last ischemic evaluation was a heart cath 2 to 3 years ago where he had 2 stents placed, idb.  At present he is CP free, but continues to feel weak. He was given his Xarelto this morning with breakfast.    He previously followed w Dr. Delong of our practice, but has not been seen in 10/2015. He reports he has had all medical care in [place] since. He is now staying in [place] with family d/t his wife breaking her foot and not being able to drive them back home.     ROS  Review of Systems   Constitution: Positive for malaise/fatigue.   Cardiovascular: Positive for chest pain, dyspnea on exertion, leg swelling, palpitations and syncope.   Respiratory: Positive for shortness of breath.    Musculoskeletal: Positive for falls and muscle weakness.   Neurological: Positive for dizziness, loss of balance and weakness.   All other systems reviewed and are negative.      SOCIAL HX  Social History     Socioeconomic History   • Marital status:      Spouse name: Not on file   • Number of children: Not on file   • Years of education: Not on file   • Highest education level: Not on file   Tobacco Use   • Smoking status: Former Smoker   • Smokeless tobacco: Never Used   Substance and Sexual Activity   • Alcohol use: Not Currently   • Drug use: Never   • Sexual activity: Defer   Social History Narrative    Patient lives in [place] with Wife, she is his primary caregiver       FAMILY HX  History reviewed. No pertinent family history.    OBJECTIVE:  Vitals:    01/20/21 0908 01/20/21 0909 01/20/21 0912 01/20/21 1059   BP: (!) 181/104 178/99 155/88 171/89   BP Location: Right arm Right arm Right arm Right arm   Patient Position: Lying Sitting Standing Lying   Pulse: 89 89 103 90   Resp:    16   Temp:    98.2 °F  (36.8 °C)   TempSrc:    Oral   SpO2:   98%    Weight:       Height:         I/O last 3 completed shifts:  In: -   Out: 1500 [Urine:1500]  No intake/output data recorded.  Intake & Output (last 3 days)       01/17 0701 - 01/18 0700 01/18 0701 - 01/19 0700 01/19 0701 - 01/20 0700 01/20 0701 - 01/21 0700    Urine (mL/kg/hr)   1500     Total Output   1500     Net   -1500                    PHYSICAL EXAMINATION:  Vitals signs and nursing note reviewed.   Constitutional:       General: Not in acute distress.     Appearance: Well-developed and not in distress. Obese. Chronically ill-appearing.   Eyes:      Conjunctiva/sclera: Conjunctivae normal.   HENT:      Head: Normocephalic and atraumatic.      Right Ear: External ear normal.      Left Ear: External ear normal.      Nose: Nose normal.   Neck:      Musculoskeletal: Neck supple.      Thyroid: No thyromegaly.      Vascular: No carotid bruit or JVD.   Pulmonary:      Effort: Pulmonary effort is normal. No respiratory distress.      Breath sounds: Normal breath sounds. No decreased breath sounds. No wheezing. No rhonchi. No rales.   Cardiovascular:      Normal rate. Regular rhythm. Normal S1. Normal S2.      Murmurs: There is no murmur.      No gallop. No click. No rub.   Pulses:     No decreased pulses.      Radial: 2+ bilaterally.     Femoral: 2+ on the right side.     Dorsalis pedis: 1+ bilaterally.  Edema:     Peripheral edema absent.   Abdominal:      General: Bowel sounds are normal.      Palpations: Abdomen is soft.      Tenderness: There is no abdominal tenderness.   Musculoskeletal: Normal range of motion.   Skin:     General: Skin is warm and dry.      Coloration: Skin is pale.      Findings: No erythema.   Neurological:      Mental Status: Alert and oriented to person, place, and time.      Comments: No focal deficits.   Psychiatric:         Thought Content: Thought content normal.         Diagnostic Data:  Lab Results (last 24 hours)     Procedure Component  Value Units Date/Time    POC Glucose Once [[id number]]  (Abnormal) Collected: 01/20/21 1106    Specimen: Blood Updated: 01/20/21 1108     Glucose 332 mg/dL     MRSA Screen, PCR (Inpatient) - Swab, Nares [[id number]]  (Abnormal) Collected: 01/19/21 2222    Specimen: Swab from Nares Updated: 01/20/21 0918     MRSA PCR Positive    POC Glucose Once [[id number]]  (Abnormal) Collected: 01/20/21 0718    Specimen: Blood Updated: 01/20/21 0719     Glucose 265 mg/dL     Cortisol - AM [[id number]] Collected: 01/20/21 0437    Specimen: Blood Updated: 01/20/21 0536     Cortisol - AM 17.79 mcg/dL     Narrative:      Cortisol Reference Ranges:    Cortisol 6AM - 10AM Range: 6.02-18.40 mcg/dl  Cortisol 4PM - 8PM Range: 2.68-10.50 mcg/dl        Basic Metabolic Panel [[id number]]  (Abnormal) Collected: 01/20/21 0437    Specimen: Blood Updated: 01/20/21 0535     Glucose 239 mg/dL      BUN 13 mg/dL      Creatinine 1.08 mg/dL      Sodium 141 mmol/L      Potassium 4.2 mmol/L      Chloride 105 mmol/L      CO2 26.0 mmol/L      Calcium 8.7 mg/dL      eGFR Non African Amer 68 mL/min/1.73      BUN/Creatinine Ratio 12.0     Anion Gap 10.0 mmol/L     Narrative:      GFR Normal >60  Chronic Kidney Disease <60  Kidney Failure <15      Lactic Acid, Reflex [[id number]]  (Normal) Collected: 01/20/21 0437    Specimen: Blood Updated: 01/20/21 0527     Lactate 1.4 mmol/L      Comment: Falsely depressed results may occur on samples drawn from patients receiving N-Acetylcysteine (NAC) or Metamizole.       CBC (No Diff) [[id number]]  (Abnormal) Collected: 01/20/21 0437    Specimen: Blood Updated: 01/20/21 0505     WBC 5.41 10*3/mm3      RBC 3.33 10*6/mm3      Hemoglobin 9.9 g/dL      Hematocrit 33.2 %      MCV 99.7 fL      MCH 29.7 pg      MCHC 29.8 g/dL      RDW 20.0 %      RDW-SD 73.3 fl      MPV 9.8 fL      Platelets 205 10*3/mm3     POC Glucose Once [[id number]]  (Abnormal) Collected: 01/20/21 0325    Specimen: Blood Updated: 01/20/21 0326     Glucose 195  mg/dL     Lactic Acid, Reflex Timer (This will reflex a repeat order 3-3:15 hours after ordered.) [[id number]] Collected: 01/19/21 2142    Specimen: Blood Updated: 01/20/21 0145     Hold Tube Hold for add-ons.     Comment: Auto resulted.       Urinalysis With Culture If Indicated - Urine, Clean Catch [[id number]]  (Normal) Collected: 01/20/21 0000    Specimen: Urine, Clean Catch Updated: 01/20/21 0144     Color, UA Yellow     Appearance, UA Clear     pH, UA 7.5     Specific Gravity, UA 1.013     Glucose, UA Negative     Ketones, UA Negative     Bilirubin, UA Negative     Blood, UA Negative     Protein, UA Negative     Leuk Esterase, UA Negative     Nitrite, UA Negative     Urobilinogen, UA 0.2 E.U./dL    Narrative:      Urine microscopic not indicated.    COVID PRE-OP / PRE-PROCEDURE SCREENING ORDER (NO ISOLATION) - Swab, Nasopharynx [[id number]]  (Normal) Collected: 01/19/21 2222    Specimen: Swab from Nasopharynx Updated: 01/20/21 0049    Narrative:      The following orders were created for panel order COVID PRE-OP / PRE-PROCEDURE SCREENING ORDER (NO ISOLATION) - Swab, Nasopharynx.  Procedure                               Abnormality         Status                     ---------                               -----------         ------                     COVID-19 and FLU A/B PCR...[[id number]]  Normal              Final result                 Please view results for these tests on the individual orders.    COVID-19 and FLU A/B PCR - Swab, Nasopharynx [[id number]]  (Normal) Collected: 01/19/21 2222    Specimen: Swab from Nasopharynx Updated: 01/20/21 0049     COVID19 Not Detected     Influenza A PCR Not Detected     Influenza B PCR Not Detected    Narrative:      Fact sheet for providers: https://www.fda.gov/media/813680/download    Fact sheet for patients: https://www.fda.gov/media/780110/download    Test performed by PCR.    POC Glucose Once [[id number]]  (Abnormal) Collected: 01/19/21 2347    Specimen: Blood Updated:  01/19/21 2348     Glucose 159 mg/dL     Blood Culture - Blood, Arm, Left [[id number]] Collected: 01/19/21 2147    Specimen: Blood from Arm, Left Updated: 01/19/21 2239    Blood Culture - Blood, Arm, Left [[id number]] Collected: 01/19/21 2142    Specimen: Blood from Arm, Left Updated: 01/19/21 2239    Troponin [[id number]]  (Normal) Collected: 01/19/21 2142    Specimen: Blood Updated: 01/19/21 2239     Troponin T 0.010 ng/mL     Narrative:      Troponin T Reference Range:  <= 0.03 ng/mL-   Negative for AMI  >0.03 ng/mL-     Abnormal for myocardial necrosis.  Clinicians would have to utilize clinical acumen, EKG, Troponin and serial changes to determine if it is an Acute Myocardial Infarction or myocardial injury due to an underlying chronic condition.       Results may be falsely decreased if patient taking Biotin.      Lactic Acid, Plasma [[id number]]  (Abnormal) Collected: 01/19/21 2142    Specimen: Blood Updated: 01/19/21 2234     Lactate 2.5 mmol/L      Comment: Falsely depressed results may occur on samples drawn from patients receiving N-Acetylcysteine (NAC) or Metamizole.       POC Glucose Once [[id number]]  (Abnormal) Collected: 01/19/21 2155    Specimen: Blood Updated: 01/19/21 2157     Glucose 150 mg/dL     Protime-INR [[id number]]  (Abnormal) Collected: 01/19/21 1635    Specimen: Blood Updated: 01/19/21 1710     Protime 17.1 Seconds      INR 1.43    POC Glucose Once [[id number]]  (Abnormal) Collected: 01/19/21 1652    Specimen: Blood Updated: 01/19/21 1703     Glucose 181 mg/dL     CBC & Differential [[id number]]  (Abnormal) Collected: 01/19/21 1451    Specimen: Blood Updated: 01/19/21 1653    Narrative:      The following orders were created for panel order CBC & Differential.  Procedure                               Abnormality         Status                     ---------                               -----------         ------                     Scan Slide[[id number]]                                       Final  result               CBC Auto Differential[[id number]]        Abnormal            Final result                 Please view results for these tests on the individual orders.    CBC Auto Differential [[id number]]  (Abnormal) Collected: 01/19/21 1451    Specimen: Blood Updated: 01/19/21 1653     WBC 6.26 10*3/mm3      RBC 3.57 10*6/mm3      Hemoglobin 10.6 g/dL      Hematocrit 34.6 %      MCV 96.9 fL      MCH 29.7 pg      MCHC 30.6 g/dL      RDW 20.1 %      RDW-SD 70.5 fl      MPV 10.1 fL      Platelets 252 10*3/mm3      Neutrophil % 56.5 %      Lymphocyte % 24.3 %      Monocyte % 13.9 %      Eosinophil % 1.9 %      Basophil % 0.8 %      Immature Grans % 2.6 %      Neutrophils, Absolute 3.54 10*3/mm3      Lymphocytes, Absolute 1.52 10*3/mm3      Monocytes, Absolute 0.87 10*3/mm3      Eosinophils, Absolute 0.12 10*3/mm3      Basophils, Absolute 0.05 10*3/mm3      Immature Grans, Absolute 0.16 10*3/mm3      nRBC 0.3 /100 WBC     Narrative:      Appended report. These results have been appended to a previously verified report.    Scan Slide [[id number]] Collected: 01/19/21 1451    Specimen: Blood Updated: 01/19/21 1653     Macrocytes Slight/1+     WBC Morphology Normal     Platelet Morphology Normal    POC Glucose Once [[id number]]  (Abnormal) Collected: 01/19/21 1616    Specimen: Blood Updated: 01/19/21 1618     Glucose 51 mg/dL     Troponin [[id number]]  (Abnormal) Collected: 01/19/21 1451    Specimen: Blood Updated: 01/19/21 1603     Troponin T 0.047 ng/mL     Narrative:      Troponin T Reference Range:  <= 0.03 ng/mL-   Negative for AMI  >0.03 ng/mL-     Abnormal for myocardial necrosis.  Clinicians would have to utilize clinical acumen, EKG, Troponin and serial changes to determine if it is an Acute Myocardial Infarction or myocardial injury due to an underlying chronic condition.       Results may be falsely decreased if patient taking Biotin.      POC Glucose Once [[id number]]  (Abnormal) Collected: 01/19/21 9194     Specimen: Blood Updated: 01/19/21 1602     Glucose 41 mg/dL     T4, Free [[id number]]  (Normal) Collected: 01/19/21 1451    Specimen: Blood Updated: 01/19/21 1553     Free T4 1.41 ng/dL     Narrative:      Results may be falsely increased if patient taking Biotin.      BNP [[id number]]  (Abnormal) Collected: 01/19/21 1451    Specimen: Blood Updated: 01/19/21 1552     proBNP 6,864.0 pg/mL     Narrative:      Among patients with dyspnea, NT-proBNP is highly sensitive for the detection of acute congestive heart failure. In addition NT-proBNP of <300 pg/ml effectively rules out acute congestive heart failure with 99% negative predictive value.    Results may be falsely decreased if patient taking Biotin.      TSH [[id number]]  (Abnormal) Collected: 01/19/21 1451    Specimen: Blood Updated: 01/19/21 1552     TSH 6.250 uIU/mL     Narrative:      Due to abnormal TSH results, suggest ordering Free T4.    Comprehensive Metabolic Panel [[id number]]  (Abnormal) Collected: 01/19/21 1451    Specimen: Blood Updated: 01/19/21 1538     Glucose 58 mg/dL      BUN 12 mg/dL      Creatinine 1.18 mg/dL      Sodium 141 mmol/L      Potassium 3.2 mmol/L      Chloride 103 mmol/L      CO2 25.0 mmol/L      Calcium 9.1 mg/dL      Total Protein 7.6 g/dL      Albumin 3.50 g/dL      ALT (SGPT) 7 U/L      AST (SGOT) 34 U/L      Alkaline Phosphatase 696 U/L      Total Bilirubin 0.3 mg/dL      eGFR Non African Amer 61 mL/min/1.73      Globulin 4.1 gm/dL      A/G Ratio 0.9 g/dL      BUN/Creatinine Ratio 10.2     Anion Gap 13.0 mmol/L     Narrative:      GFR Normal >60  Chronic Kidney Disease <60  Kidney Failure <15      Lipase [[id number]]  (Normal) Collected: 01/19/21 1451    Specimen: Blood Updated: 01/19/21 1538     Lipase 19 U/L     Magnesium [[id number]]  (Normal) Collected: 01/19/21 1451    Specimen: Blood Updated: 01/19/21 1538     Magnesium 2.0 mg/dL         ECG/EMG Results (last 24 hours)     Procedure Component Value Units Date/Time    ECG 12 Lead  [[id number]] Collected: 01/19/21 1438     Updated: 01/19/21 1925     QT Interval 432 ms      QTC Interval 507 ms     Narrative:      Test Reason : chest pain  Blood Pressure : **/** mmHG  Vent. Rate : 083 BPM     Atrial Rate : 083 BPM     P-R Int : 186 ms          QRS Dur : 092 ms      QT Int : 432 ms       P-R-T Axes : 000 -26 203 degrees     QTc Int : 507 ms    Poor data quality  Sinus rhythm with occasional premature ventricular complexes  Left ventricular hypertrophy with repolarization abnormality  Prolonged QT  Abnormal ECG  When compared with ECG of 16-JAN-2021 21:13,  T wave inversion now evident in Inferior leads  T wave inversion more evident in Anterior leads  Confirmed by MD Youssef Michael ([id number]) on 1/19/2021 7:25:41 PM    Referred By:  SHAYLEE           Confirmed By:Warner Youssef MD    ECG 12 Lead [[id number]] Collected: 01/19/21 1440     Updated: 01/19/21 1925     QT Interval 436 ms      QTC Interval 509 ms     Narrative:      Test Reason : REPEAT  Blood Pressure : **/** mmHG  Vent. Rate : 082 BPM     Atrial Rate : 082 BPM     P-R Int : 182 ms          QRS Dur : 090 ms      QT Int : 436 ms       P-R-T Axes : -15 -26 209 degrees     QTc Int : 509 ms    Sinus rhythm with occasional premature ventricular complexes  Left ventricular hypertrophy with repolarization abnormality  Prolonged QT  Abnormal ECG  When compared with ECG of 19-JAN-2021 14:38, (Unconfirmed)  No significant change was found  Confirmed by MD Youssef Michael ([id number]) on 1/19/2021 7:25:47 PM    Referred By:  SHAYLEE           Confirmed By:Warner Youssef MD    ECG 12 Lead [[id number]] Collected: 01/20/21 0440     Updated: 01/20/21 0657     QT Interval 374 ms      QTC Interval 450 ms     Narrative:      Test Reason : chest pain  Blood Pressure : **/** mmHG  Vent. Rate : 087 BPM     Atrial Rate : 087 BPM     P-R Int : 182 ms          QRS Dur : 092 ms      QT Int : 374 ms       P-R-T Axes : 036 -19 187 degrees     QTc Int : 450 ms    Normal sinus  rhythm  Possible Left atrial enlargement  Left ventricular hypertrophy  T wave abnormality, consider lateral ischemia  Abnormal ECG  When compared with ECG of 19-JAN-2021 14:40,  premature ventricular complexes are no longer present  T wave inversion less evident in Anterior leads  QT has shortened    Referred By:             Confirmed By:                Syncope    Anemia, chronic disease    DM2 (diabetes mellitus, type 2) (CMS/HCC)    Prostate cancer metastatic to bone (CMS/HCC)    Essential hypertension    HLD (hyperlipidemia)    GERD without esophagitis    CAD (coronary artery disease)    CHF (congestive heart failure) (CMS/[facility])    Bilateral conjunctivitis    NSTEMI (non-ST elevated myocardial infarction) (CMS/[facility])    Hx of blood clots    Open wound of foot    Elevated TSH        ASSESSMENT/PLAN:  Syncope  CP  Known CAD, increasing angina  Chronic CHF  - hx CABG 2009 in [facility]/2012 w D3 stent and posterolateral stent per Dr. Delong  -Troponin was 0.016 on 1/16/21 recent admission, then trended 0.047, 0.01 this admission  - EKG abnormal, chronic lateral T wave inversions, some transient anterior/inferior T wave inversions  - CTA no PE, atelectasis vs. Airspace dz L base  CT head nothing acute  - proBNP 5,110  - suspect syncope d/t hypoglycemia vs hypotension  - with recurrent CP similar in character to prior anginal pain, would recommend ischemic testing.  With increasing angina on antianginals including Coreg and isosorbide will plan for cardiac catheterization tomorrow  -N.p.o. after midnight  -Hold Xarelto  -Start aspirin 81 mg  - echo pending    Hypokalemia  - replacement protocol  - holding home lasix    HTN  -Resume coreg, imdur as patient is now hypertensive, l  -Currently off of Lasix      HLD  - home pravastatin  -LDL 51    T2DM  Recent DKA  - a1c 9.5%, recent admission 1/16 - 1/18 for DKA  - hypoglycemia noted en route to ED per EMS  - ssi per primary team    Hx DVT/PEs  - chronically on  Xarelto will hold tomorrow for cardiac cath    Metastatic prostate cancer  - dx 2014 with bone mnetastasis, maintained on po chemo until 2020 at which time he was switched to IV chemo, follows w oncology in [place]  - follows w oncology in [place], but scheduled to start chemo at [facility] this week     Anemia of chronic dz      Scribed for Dr. Booker Herbert MD by Marian Leal PA-C. 1/20/2021  11:54 EST     I,Booker Herbert M.D., personally performed the services described in this documentation as scribed by the above named individual in my presence, and it is both accurate and complete.  1/20/2021  14:26 EST    Patient seen and examined in a face-to-face encounter.  Presents with main complaints.  First complaint is a syncopal episode that appears to be related to a combination of hypotension and hypoglycemia.  The patient was recently discharged after treatment for DKA and has had some persistent nausea and poor p.o. intake.  Has just generally felt unwell no evidence of any arrhythmias on telemetry.  Agree with the echocardiogram that is pending    Second complaint is chest pain.  After the syncopal episode yesterday the patient had a sharp episode of chest pain and it felt severe enough that he wanted to be evaluated in the hospital.  He has been having these with some increasing frequency/severity over the last 6 months.  He was planned to have a stress test tomorrow with his primary cardiologist in [place] but has been here in [place] since Christmas.  Angina is occurring despite being on carvedilol and isosorbide at home.  Describes it is similar in nature to when he is required previous PCI.  Troponin mildly elevated on admission but now within normal limits.  EKG with anterolateral T wave inversions.    Discussed the risk and benefits of further evaluation with a cardiac catheterization he is agreeable.  We will keep n.p.o. after midnight and schedule heart cath tomorrow with Dr. Whitney.  We will hold his  Xarelto at this time

## 2021-01-20 NOTE — PLAN OF CARE
Goal Outcome Evaluation:  Plan of Care Reviewed With: patient  Progress: improving  Outcome Summary: BP increased this afternoon. Home meds restarted. Blood sugar elevated this evening. Insulin given. Continue to monitor blood sugar. No c/o chest pressure since this morning. Left heart cath scheduled for tomorrow. NPO after midnight. Will continue to monitor.      Problem: Syncope  Goal: Absence of Syncopal Symptoms  Outcome: Ongoing, Progressing     Problem: Adult Inpatient Plan of Care  Goal: Plan of Care Review  Outcome: Ongoing, Progressing  Flowsheets (Taken 1/20/2021 2861)  Progress: improving  Plan of Care Reviewed With: patient  Outcome Summary: BP increased this afternoon. Home meds restarted. Blood sugar elevated this evening. Insulin given. Continue to monitor blood sugar. No c/o chest pressure since this morning. Left heart cath scheduled for tomorrow. NPO after midnight. Will continue to monitor.

## 2021-01-20 NOTE — THERAPY EVALUATION
Patient Name: Shlomo Mcallister  : 1950    MRN: [id number]                              Today's Date: 2021       Admit Date: 2021    Visit Dx:     ICD-10-CM ICD-9-CM   1. Syncope, unspecified syncope type  R55 780.2   2. Hypoglycemia  E16.2 251.2   3. History of pancreatic cancer  Z85.07 V10.09   4. Chest pain, unspecified type  R07.9 786.50   5. History of coronary artery disease  Z86.79 V12.59   6. Elevated troponin  R77.8 790.6     Patient Active Problem List   Diagnosis   • DKA (diabetic ketoacidoses) (CMS/[facility])   • Anemia, chronic disease   • DM2 (diabetes mellitus, type 2) (CMS/[facility])   • Prostate cancer metastatic to bone (CMS/[facility])   • Essential hypertension   • HLD (hyperlipidemia)   • GERD without esophagitis   • CAD (coronary artery disease)   • CHF (congestive heart failure) (CMS/[facility])   • Syncope   • Bilateral conjunctivitis   • NSTEMI (non-ST elevated myocardial infarction) (CMS/[facility])   • Hx of blood clots   • Open wound of foot   • Elevated TSH     Past Medical History:   Diagnosis Date   • Anemia 2021   • CAD (coronary artery disease) 2021   • CHF (congestive heart failure) (CMS/[facility]) 2021   • DM2 (diabetes mellitus, type 2) (CMS/[facility]) 2021   • DVT (deep venous thrombosis) (CMS/HCC)    • GERD without esophagitis 2021   • HLD (hyperlipidemia) 2021     Past Surgical History:   Procedure Laterality Date   • KS REANOMAL CORON ART PA ORIGIN BY GRAFT     • TURP / TRANSURETHRAL INCISION / DRAINAGE PROSTATE       General Information     Row Name 21 1025          OT Time and Intention    Document Type  evaluation  -AC     Mode of Treatment  occupational therapy  -AC     Row Name 21 1028          General Information    Patient Profile Reviewed  yes  -AC     Prior Level of Function  independent:;all household mobility;ADL's;driving  -AC     Existing Precautions/Restrictions  fall admit with syncope  -AC     Barriers to Rehab  -- bone cancer  -AC     Row Name  01/20/21 1025          Living Environment    Lives With  spouse  -     Row Name 01/20/21 1025          Home Main Entrance    Number of Stairs, Main Entrance  seven  -     Stair Railings, Main Entrance  railings on both sides of stairs  -     Row Name 01/20/21 1025          Stairs Within Home, Primary    Stairs, Within Home, Primary  Pt is visiting family in [place], plans to returnt o home in [place].  7 steps front entrance, and ramp in back and mainly enters in back.  1 story, tub and walkin shower  -     Row Name 01/20/21 1025          Cognition    Orientation Status (Cognition)  oriented x 4  -     Row Name 01/20/21 1025          Safety Issues, Functional Mobility    Safety Issues Affecting Function (Mobility)  safety precaution awareness  -     Impairments Affecting Function (Mobility)  endurance/activity tolerance;strength  -       User Key  (r) = Recorded By, (t) = Taken By, (c) = Cosigned By    Initials Name Provider Type     Cara Berry OT Occupational Therapist          Mobility/ADL's     Row Name 01/20/21 1025          Bed Mobility    Bed Mobility  supine-sit;sit-supine  -     Supine-Sit [place] (Bed Mobility)  modified independence  -     Sit-Supine [place] (Bed Mobility)  modified independence  -     Assistive Device (Bed Mobility)  head of bed elevated;bed rails  -     Row Name 01/20/21 1025          Transfers    Transfers  sit-stand transfer  -     Sit-Stand [place] (Transfers)  supervision  -     Row Name 01/20/21 1025          Sit-Stand Transfer    Assistive Device (Sit-Stand Transfers)  walker, front-wheeled  -     Row Name 01/20/21 1025          Functional Mobility    Functional Mobility- Ind. Level  contact guard assist  -     Functional Mobility- Device  rolling walker  -     Functional Mobility-Distance (Feet)  20  -     Row Name 01/20/21 1025          Activities of Daily Living    BADL Assessment/Intervention  lower body dressing  -     Row  Name 01/20/21 1025          Lower Body Dressing Assessment/Training    [place]att Level (Lower Body Dressing)  doff;don;shoes/slippers;set up;supervision  -AC     Position (Lower Body Dressing)  edge of bed sitting  -AC       User Key  (r) = Recorded By, (t) = Taken By, (c) = Cosigned By    Initials Name Provider Type    Cara Mistry, OT Occupational Therapist        Obj/Interventions     Row Name 01/20/21 1025          Sensory Assessment (Somatosensory)    Sensory Assessment (Somatosensory)  UE sensation intact;bilateral UE  -AC     Row Name 01/20/21 1025          Range of Motion Comprehensive    General Range of Motion  bilateral upper extremity ROM WNL  -AC     Row Name 01/20/21 1025          Strength Comprehensive (MMT)    Comment, General Manual Muscle Testing (MMT) Assessment  BUE grossly 4+/5  -AC       User Key  (r) = Recorded By, (t) = Taken By, (c) = Cosigned By    Initials Name Provider Type    AC Cara Berry, OT Occupational Therapist        Goals/Plan     Row Name 01/20/21 1025          Transfer Goal 1 (OT)    Activity/Assistive Device (Transfer Goal 1, OT)  bed-to-chair/chair-to-bed;toilet;walker, rolling  -AC     [place] Level/Cues Needed (Transfer Goal 1, OT)  standby assist  -AC     Time Frame (Transfer Goal 1, OT)  by discharge  -AC     Progress/Outcome (Transfer Goal 1, OT)  goal ongoing  -     Row Name 01/20/21 1025          Dressing Goal 1 (OT)    Activity/Device (Dressing Goal 1, OT)  lower body dressing don pants/undergarment  -AC     Pratt/Cues Needed (Dressing Goal 1, OT)  set-up required;standby assist  -AC     Time Frame (Dressing Goal 1, OT)  by discharge  -AC     Progress/Outcome (Dressing Goal 1, OT)  goal ongoing  -     Row Name 01/20/21 1025          Toileting Goal 1 (OT)    Activity/Device (Toileting Goal 1, OT)  adjust/manage clothing;perform perineal hygiene  -AC     [place]att Level/Cues Needed (Toileting Goal 1, OT)  standby assist  -AC     Time Frame  (Toileting Goal 1, OT)  by discharge  -     Progress/Outcome (Toileting Goal 1, OT)  goal ongoing  -AC       User Key  (r) = Recorded By, (t) = Taken By, (c) = Cosigned By    Initials Name Provider Type    AC Cara Berry, OT Occupational Therapist        Clinical Impression     Row Name 01/20/21 1025          Pain Assessment    Additional Documentation  Pain Scale: Numbers Pre/Post-Treatment (Group)  -     Row Name 01/20/21 1025          Pain Scale: Numbers Pre/Post-Treatment    Pretreatment Pain Rating  0/10 - no pain  -AC     Posttreatment Pain Rating  0/10 - no pain  -AC     Pre/Posttreatment Pain Comment  pt reporrts no chest pain presently  -     Row Name 01/20/21 1025          Plan of Care Review    Plan of Care Reviewed With  patient;spouse  -     Outcome Summary  OT eval complete.  Pt mod independent with bed mobility, supervision to don/doff slippers, supervision STS,  CGA to ambualte 20 ft with RW.  OT will follow to advance pt toward increased independence with ADLs.  Recommend home upon d/c.  -     Row Name 01/20/21 1025          Therapy Assessment/Plan (OT)    Rehab Potential (OT)  good, to achieve stated therapy goals  -     Criteria for Skilled Therapeutic Interventions Met (OT)  yes;skilled treatment is necessary  -     Therapy Frequency (OT)  daily  -     Row Name 01/20/21 1025          Therapy Plan Review/Discharge Plan (OT)    Anticipated Discharge Disposition (OT)  home  -     Row Name 01/20/21 1025          Vital Signs    Pre Systolic BP Rehab  155  -AC     Pre Treatment Diastolic BP  88  -AC     Intra Systolic BP Rehab  156  -AC     Intra Treatment Diastolic BP  103  -AC     Post Systolic BP Rehab  (S) 191 notified RN  -AC     Post Treatment Diastolic BP  99  -AC     Pretreatment Heart Rate (beats/min)  96  -AC     Intratreatment Heart Rate (beats/min)  93  -AC     Posttreatment Heart Rate (beats/min)  105  -AC     Pre SpO2 (%)  100  -AC     O2 Delivery Pre Treatment  room  air  -AC     O2 Delivery Intra Treatment  room air  -AC     Post SpO2 (%)  99  -AC     O2 Delivery Post Treatment  room air  -AC     Pre Patient Position  Supine  -AC     Intra Patient Position  Standing  -AC     Post Patient Position  Supine  -AC     Row Name 01/20/21 1025          Positioning and Restraints    Pre-Treatment Position  in bed  -AC     Post Treatment Position  bed  -AC     In Bed  supine;call light within reach;encouraged to call for assist;exit alarm on;with family/caregiver  -       User Key  (r) = Recorded By, (t) = Taken By, (c) = Cosigned By    Initials Name Provider Type    Cara Mistry OT Occupational Therapist        Outcome Measures     Row Name 01/20/21 1025          How much help from another is currently needed...    Putting on and taking off regular lower body clothing?  3  -AC     Bathing (including washing, rinsing, and drying)  3  -AC     Toileting (which includes using toilet bed pan or urinal)  3  -AC     Putting on and taking off regular upper body clothing  4  -AC     Taking care of personal grooming (such as brushing teeth)  4  -AC     Eating meals  4  -AC     AM-PAC 6 Clicks Score (OT)  21  -     Row Name 01/20/21 1025          Functional Assessment    Outcome Measure Options  AM-PAC 6 Clicks Daily Activity (OT)  -       User Key  (r) = Recorded By, (t) = Taken By, (c) = Cosigned By    Initials Name Provider Type    Cara Mistry OT Occupational Therapist        Occupational Therapy Education                 Title: PT OT SLP Therapies (In Progress)     Topic: Occupational Therapy (In Progress)     Point: ADL training (Done)     Description:   Instruct learner(s) on proper safety adaptation and remediation techniques during self care or transfers.   Instruct in proper use of assistive devices.              Learning Progress Summary           Patient Acceptance, E, VU by TEDDY at 1/20/2021 1025    Comment: benefits of activity, role of OT, d/c plan   Family  Acceptance, E, VU by  at 1/20/2021 1025    Comment: benefits of activity, role of OT, d/c plan                   Point: Home exercise program (Not Started)     Description:   Instruct learner(s) on appropriate technique for monitoring, assisting and/or progressing therapeutic exercises/activities.              Learner Progress:  Not documented in this visit.          Point: Precautions (Not Started)     Description:   Instruct learner(s) on prescribed precautions during self-care and functional transfers.              Learner Progress:  Not documented in this visit.          Point: Body mechanics (Not Started)     Description:   Instruct learner(s) on proper positioning and spine alignment during self-care, functional mobility activities and/or exercises.              Learner Progress:  Not documented in this visit.                      User Key     Initials Effective Dates Name Provider Type [facility] 06/23/15 -  Cara Berry, OT Occupational Therapist OT              OT Recommendation and Plan  Therapy Frequency (OT): daily  Plan of Care Review  Plan of Care Reviewed With: patient, spouse  Outcome Summary: OT eval complete.  Pt mod independent with bed mobility, supervision to don/doff slippers, supervision STS,  CGA to ambualte 20 ft with RW.  OT will follow to advance pt toward increased independence with ADLs.  Recommend home upon d/c.     Time Calculation:   Time Calculation- OT     Row Name 01/20/21 1025             Time Calculation- OT    OT Start Time  1025  -      OT Received On  01/20/21  -      OT Goal Re-Cert Due Date  01/30/21  -        User Key  (r) = Recorded By, (t) = Taken By, (c) = Cosigned By    Initials Name Provider Type     Cara Berry, OT Occupational Therapist        Therapy Charges for Today     Code Description Service Date Service Provider Modifiers Qty    [id number]  OT EVAL LOW COMPLEXITY 4 1/20/2021 Cara Berry, OT GO 1               Cara ALTMAN. Jamal  OT  1/20/2021

## 2021-01-20 NOTE — PLAN OF CARE
Problem: Adult Inpatient Plan of Care  Goal: Plan of Care Review  Recent Flowsheet Documentation  Taken 1/20/2021 1025 by Cara Berry OT  Plan of Care Reviewed With:   patient   spouse  Outcome Summary: OT eval complete.  Pt mod independent with bed mobility, supervision to don/doff slippers, supervision STS,  CGA to ambulate 20 ft with RW.  OT will follow to advance pt toward increased independence with ADLs.  Recommend home upon d/c.

## 2021-01-20 NOTE — OUTREACH NOTE
Medical Week 1 Survey      Responses   [facility] patient discharged from?  [place]   Does the patient have one of the following disease processes/diagnoses(primary or secondary)?  Other   Week 1 attempt successful?  No   Unsuccessful attempts  Attempt 1   Revoke  Readmitted          Aliyah Hdz RN

## 2021-01-20 NOTE — PROGRESS NOTES
[facility] Medicine Services  Clinical Decision Unit  PROGRESS NOTE    Patient Name: Shlomo Andres  : 1950  MRN: [id number]    Admission Date and Time: 2021  2:33 PM  Primary Care Physician: Provider, No Known    Subjective   Subjective     CC:  CP and syncope, hypoglycemic episode    HPI:  FSBS's much improved.  More history reveals has only been on his U500 insulin x1 month, gets FSBS as low as 40-50's at home several times a week.      No current CP but gives hx of exertional pressure concerning for unstable angina.    Review of Systems  Gen- No fevers, chills, HA, uncontrolled pain  CV- No current chest pain, palpitations, new edema  Resp- No SOA, cough  GI- No N/V/D, abd pain, constipation  Skin - No rash        Objective   Objective     Vital Signs:   Temp:  [97.9 °F (36.6 °C)-99.2 °F (37.3 °C)] 98 °F (36.7 °C)  Heart Rate:  [] 103  Resp:  [16-18] 16  BP: (111-182)/() 155/88  Flow (L/min):  [2] 2     Physical Exam:  Constitutional: No acute distress, awake, alert, nontoxic, obese body habitus  Respiratory: Dull bases, poor effort, nonlabored respirations   Cardiovascular: RRR, no murmur  Musculoskeletal: Trace peripheral edema, normal muscle tone for age  Psychiatric: Appropriate affect, good insight and judgement, cooperative      Result Review:  IResults for SHLOMO ANDRES (MRN [id number]) as of 2021 09:58   Ref. Range 2021 03:25 2021 04:37 2021 07:18   Glucose Latest Ref Range: 70 - 130 mg/dL 195 (H) 239 (H) 265 (H)   Results for SHLMOO ANDRES (MRN [id number]) as of 2021 09:58   Ref. Range 2021 04:37   Cortisol - AM Latest Units: mcg/dL 17.79   Results for SHLOMO ANDRES (MRN [id number]) as of 2021 09:58   Ref. Range 2021 14:51 2021 21:42   Troponin T Latest Ref Range: 0.000 - 0.030 ng/mL 0.047 (C) 0.010     Assessment/Plan   Assessment / Plan     Active Hospital Problems    Diagnosis  POA   • **Syncope  [R55]  Yes   • Bilateral conjunctivitis [H10.9]  Yes   • NSTEMI (non-ST elevated myocardial infarction) (CMS/[facility]) [I21.4]  Yes   • Hx of blood clots [Z86.718]  Not Applicable   • CHF (congestive heart failure) (CMS/[facility]) [I50.9]  Unknown   • Anemia, chronic disease [D63.8]  Yes   • CAD (coronary artery disease) [I25.10]  Yes   • DM2 (diabetes mellitus, type 2) (CMS/[facility]) [E11.9]  Yes   • Essential hypertension [I10]  Yes   • GERD without esophagitis [K21.9]  Yes   • HLD (hyperlipidemia) [E78.5]  Yes   • Prostate cancer metastatic to bone (CMS/[facility]) [C61, C79.51]  Yes   • Open wound of foot [S91.309A]  Unknown   • Elevated TSH [R79.89]  Yes      Resolved Hospital Problems   No resolved problems to display.        Brief course to date:  Shlomo Mcallister is a 70 y.o. male with CAD s/p CABG, CHF unknown EF, uncontrolled DM2, prior DVT/PE on Xarelto, metastatic prostate cancer usually treated in [place] but being referred to .  He was admitted here 1/16/2021 for DKA and discharged 1/18/2021, he has recurring hypoglycemic episodes chronically due to brittle diabetes.  Brought to [facility] ED for anginal chest pain and syncopal spell, found to be hypoglycemic on evaluation as well as have initial troponin elevation which subsequently trended down.    Plan:  - continue current insulin regimen, allowing some permissive mild hyperglycemia -->  At discharge plan to decrease u500 insulin from 35 units to 30 units BID  - Cardiology plans for LHC in am     DVT Prophylaxis:  Xarelto    Discharge readiness criteria:   1. LHC in am    Possible d/c tomorrow if LHC without intervention    Ligia Carrillo MD  01/20/21

## 2021-01-20 NOTE — H&P
"    [facility] Medicine Services  Clinical Decision Unit (CDU)  History and Physical    Patient Name: Shlomo Mcallister  : 1950  MRN: [id number]  Primary Care Physician: Provider, No Known  Date of admission: 2021  2:33 PM      Subjective   Subjective     Chief Complaint:  Recurrent syncope     HPI:  Shlomo Mcallister is a 70 y.o. 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 ED after a syncopal episode via EMS. Patient was recently admitted here on 21 for nausea and vomiting and was found to be in DKA. He was discharged home on 21.  Patient receives his health care in [place] but is currently staying with a family member here in [place]. The patient reports losing consciousness this afternoon but denies hitting his head.  He denies presyncopal dizziness or palpitations.  On further questioning, he admits to syncopal episodes approximately 2x per month for over a year. He attributes it to \"the cancer\" and his chemotherapy. Additionally, the patient currently c/o right-sided chest pressure. He states the chest pain has been occurring more frequently over the past several months.  He takes nitro at home for the chest pain.  He admits to associated dyspnea. The pain radiates from the right side of his chest to the left side.  The patient's cardiologist in [place] had him scheduled for a stress test that he has been unable to get home for.  He reports a \"fair appetite\" but has lost 55 pounds over the past year since starting chemo. The patient follows with an oncologist in [place] but due to his current circumstances he will start back on chemo at . The patient denies fever or chills. He denies cough, congestion, or sore throat. No loss of taste or smell. No N/V/D or abdominal pain. No urinary symptoms. He c/o of  generalized weakness and requires a walker " to ambulate. No numbness, facial droop, confusion. He also c/o irritation and crusting in both of his eyes. He states a PCP in [place] gave him saline drops for his eyes but they haven't helped. The patient's glucose was 188 at lunch today. He reports giving himself 35 units this morning. The patient reports taking Coreg and Imdur this morning as well. EMS reported the patient's BP was low prior to transport.    While in the ED, the patient had a low grade temp of 99.2. Labs revealed glucose 58, troponin 0.047, potassium 3.2, proBNP 6,864, hgb 10.6, TSH 6.250. CTA chest shows opacifications in the left lung base atelectasis vs. Airspace disease adjacent to a trace left pleural effusion. CT head shows nothing acute. The patient received dextrose, potassium and 1L of fluid in the ED. He will be admitted to the hospitalist service for further medical management.     Review of Systems   Constitutional: Positive for fatigue and unexpected weight change (55lbs over 1 year). Negative for appetite change, chills, diaphoresis and fever.   HENT: Positive for rhinorrhea (chronic). Negative for congestion, sore throat and trouble swallowing.    Eyes: Negative for pain and visual disturbance.   Respiratory: Positive for shortness of breath. Negative for cough, chest tightness and wheezing.    Cardiovascular: Positive for chest pain. Negative for palpitations and leg swelling.   Gastrointestinal: Negative for abdominal pain, blood in stool, constipation, diarrhea, nausea and vomiting.   Genitourinary: Negative for difficulty urinating and dysuria.   Musculoskeletal: Positive for gait problem (2/2 weakness). Negative for back pain.   Skin: Positive for wound (bilateral feet). Negative for rash.   Neurological: Positive for syncope, weakness (generalized) and headaches. Negative for dizziness, tremors, seizures, facial asymmetry, speech difficulty, light-headedness and numbness.   Hematological: Negative for adenopathy. Does not  bruise/bleed easily.   Psychiatric/Behavioral: Positive for dysphoric mood. Negative for agitation and confusion.      All other systems reviewed and negative    Personal History     Past Medical History:   Diagnosis Date   • Anemia 1/16/2021   • CAD (coronary artery disease) 1/16/2021   • CHF (congestive heart failure) (CMS/[facility]) 1/16/2021   • DM2 (diabetes mellitus, type 2) (CMS/[facility]) 1/16/2021   • DVT (deep venous thrombosis) (CMS/[facility])    • GERD without esophagitis 1/16/2021   • HLD (hyperlipidemia) 1/16/2021       Past Surgical History:   Procedure Laterality Date   • VT REANOMAL CORON ART PA ORIGIN BY GRAFT     • TURP / TRANSURETHRAL INCISION / DRAINAGE PROSTATE         Family History: family history is not on file. Otherwise pertinent FHx was reviewed and unremarkable.     Social History:  reports that he has quit smoking. He has never used smokeless tobacco. He reports previous alcohol use. He reports that he does not use drugs.  Social History     Social History Narrative    Patient lives in [place] with Wife, she is his primary caregiver       Medications:  DULoxetine, calcium citrate-vitamin d, carvedilol, fish oil, furosemide, gabapentin, insulin regular, isosorbide mononitrate, metFORMIN, multivitamin with minerals, oxyCODONE, oxyCODONE ER, pantoprazole, pravastatin, and rivaroxaban    Allergies   Allergen Reactions   • Uroxatral [Alfuzosin Hcl Er] GI Intolerance       Objective   Objective     Vital Signs:   Temp:  [98.5 °F (36.9 °C)-99.2 °F (37.3 °C)] 98.5 °F (36.9 °C)  Heart Rate:  [73-82] 75  Resp:  [16-18] 18  BP: (111-153)/() 137/70    Physical Exam   Constitutional: Awake, alert, lying in bed, chronically-ill appearing   Eyes: PERRLA, sclerae anicteric, bilateral conjunctival injection with yellowish crusting of eyelashes  HENT: NCAT, mucous membranes moist  Neck: Supple, no thyromegaly, no lymphadenopathy, trachea midline  Respiratory: Decreased breath sounds bilaterally, nonlabored  respirations, room air    Cardiovascular: RRR, no murmurs appreciated, palpable pedal pulses bilaterally, port on right side of chest  Gastrointestinal: Positive bowel sounds, soft, nontender, no distention or guarding   Musculoskeletal: No bilateral ankle edema, no clubbing or cyanosis to extremities  Psychiatric: Appropriate affect, cooperative  Neurologic: Oriented x 3, strength symmetric in all extremities, Cranial Nerves grossly intact to confrontation, speech clear  Skin: several open ulcers on bilateral 1st and 2nd toes without erythema or drainage      Results Reviewed:  Glucose 58, potassium 3.2, troponin 0.047  ECG with prolonged QTc  CTA chest shows opacifications at the left lung base of atelectasis versus airspace disease adjacent to trace left pleural effusion    Assessment/Plan   Assessment / Plan     Active Hospital Problems    Diagnosis POA   • **Syncope [R55] Yes   • Bilateral conjunctivitis [H10.9] Yes   • NSTEMI (non-ST elevated myocardial infarction) (CMS/[facility]) [I21.4] Yes   • Hx of blood clots [Z86.718] Not Applicable   • CHF (congestive heart failure) (CMS/[facility]) [I50.9] Unknown   • Anemia, chronic disease [D63.8] Yes   • CAD (coronary artery disease) [I25.10] Yes   • DM2 (diabetes mellitus, type 2) (CMS/[facility]) [E11.9] Yes   • Essential hypertension [I10] Yes   • GERD without esophagitis [K21.9] Yes   • HLD (hyperlipidemia) [E78.5] Yes   • Prostate cancer metastatic to bone (CMS/[facility]) [C61, C79.51] Yes   • Open wound of foot [S91.309A] Unknown   • Elevated TSH [R79.89] Yes     Plan:  Shlomo Mcallister is a 70 y.o. 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     1. Fall with possible LOC  - patient hypotensive and hypoglycemic upon arrival   - check a.m. cortisol   -Will check echo, especially in light of positive troponins and history of CHF without  documented EF  - orthostatic vitals   - fall precautions     2. Elevated troponin with left-sided sharp chest pain  -Initial troponin positive 0.047, trend  -EKG without apparent acute ST segment changes  -Previously scheduled for stress test in [place]  -S/p 324 mg of ASA from EMS, continue aspirin  -Per patient these chest pains are very similar to prior chest pains associated with previous CABG and stenting  -CTA without PE  -Has seen Dr. Delong in the remote past, will consult for opinion on stress testing this admission or outpatient follow-up  -N.p.o. at midnight  -Echo as noted    3. DM type 2, uncontrolled with hyper and hypoglycemia, A1c 9.5%  - recent admission for DKA 1/16/2021-1/18/2021  - glucose 58 and received D50 in ED   - hold home insulin and Metformin   -Takes U500 approximately 35U in the a.m. and 35U in the p.m.  -Levemir with sliding scale insulin  - finger sticks q3h overnight   - diabetes educator     4. Hypokalemia   - received 20 mEq in the ED  - recheck and follow electrolyte protocol   - mag WNL    5. Anemia  - stable, monitor     6. Chronic CHF with unknown EF  - appears euvolemic  - echo tomorrow for fall and elevated troponins  - hold Lasix d/t hypotension   - daily weights, strict I&O's    7. Hx of PE / DVT   - continue Xarelto     8. HTN / HLD  - hold Coreg d/t hypotension   - continue statin     9. Metastatic prostate cancer  - Oncologist in [place]  - scheduled to start chemo at  this week     10. Bilateral conjunctivitis   - start opthalmic antibiotics     11. Foot ulcers  - consult [facility]    CODE STATUS:    Code Status and Medical Interventions:   Ordered at: 01/19/21 2106     Level Of Support Discussed With:    Patient     Code Status:    CPR     Medical Interventions (Level of Support Prior to Arrest):    Full     Admission Status: INPATIENT status due to intravenous antibiotics and cardiac evaluation I feel patient’s risk for adverse outcomes and need for care warrant  "INPATIENT evaluation and I predict the patient’s care encounter to likely last beyond 2 midnights.    Discharge Blueprint (criteria for discharge):   1. Vitals stable   2. Blood glucose stable   3. Cardiology sign off     Electronically signed by Malathi Copeland PA-C, 01/19/21, 9:15 PM EST.        Attending   Admission Attestation       I have seen and examined the patient, performing an independent face-to-face diagnostic evaluation with plan of care reviewed and developed with the advanced practice clinician (APC).      Brief Summary Statement:   Shlomo Mcallister is a 70 y.o. male with CAD s/p CABG, CHF unknown EF, uncontrolled DM2, prior DVT/PE on Xarelto, metastatic prostate cancer usually treated in [place] but being referred to .  He was admitted here 1/16/2021 for DKA and discharged 1/18/2021.  Today he felt unwell when he got out of bed, later in the morning he was cleaning up dog waste and bent over then subsequently fell over, he thinks that he lost consciousness however he is not sure, he was having associated chest pains at the time.  Due to recurrent chest pains his wife insisted he come to the ED for evaluation.  He reports left-sided stabbing chest pains that are similar/nearly identical to prior pains he has had when he needed CABG and previous stents.  He does not recall anything that makes them worse however they are improved by nitro \"when I have some.\"  He does report some shortness of breath however he denies cough, wheeze, GANNON, change in taste or smell.  His blood glucose on arrival was 58 and required intravenous dextrose in the ED, he does report regular hypoglycemic episodes at home.    Remainder of detailed HPI is as noted by APC and has been reviewed and/or edited by me for completeness.    Attending Physical Exam:  Constitutional: Awake, alert, chronically ill appearing  Eyes: PERRL, sclerae anicteric, purulent conjunctival drainage  HENT: NCAT, mucous membranes moist  Neck: Supple, " trachea midline  Respiratory: Clear to auscultation bilaterally, nonlabored respirations   Cardiovascular: RRR, no murmurs, rubs, or gallops, palpable radial pulses bilaterally  Gastrointestinal: Positive bowel sounds, soft, nontender, nondistended  Musculoskeletal: No bilateral ankle edema, no clubbing or cyanosis to extremities  Psychiatric: Flat affect, cooperative  Neurologic: Oriented x 3, strength symmetric in all extremities, speech clear    Brief Assessment/Plan :  See detailed assessment and plan developed with APC which I have reviewed and/or edited for completeness.        Admission Status: I believe that this patient meets OBSERVATION status, however if further evaluation or treatment plans warrant, status may change.  Based upon current information, I predict patient's care encounter to be less than or equal to 2 midnights.        Geraldo Salazar,   01/19/21

## 2021-01-20 NOTE — PROGRESS NOTES
Clinical Nutrition   Reason For Visit: Identified at risk by screening criteria, MST score 2+    Patient Name: Shlomo Mcallister  YOB: 1950  MRN: [id number]  Date of Encounter: 01/20/21 11:44 EST  Admission date: 1/19/2021      Nutrition Assessment     Admission Problem List:  Fall  Chest pain  Elevated troponin  Hypokalemia  Anemia  Bilateral conjunctivitis  Foot ulcers - WOC eval pending  Hypoglycemic episodes      Applicable PMH:  HTN, HLD  CAD s/p CABG  CHF  T2DM  DVT  PE  Metastatic prostate cancer s/p TURP and chemotherapy      Reported/Observed/Food/Nutrition Related History   Patient and wife present during visit. Wife reports for > 1 year patient has been eating a little less than usual (</=75% estimated by wife) with resulting unintentional weight loss. Patient states over 1 year ago he weighed 275 lbs. Wife states patient eats most of what she serves at meals. Denies food allergies and difficulty chewing/swallowing. Patient agrees to Boost/Ensure supplements 2x daily during this admission. Patient eager to eat. Throughout visit patient keeps turning over onto side and not listening to RD's questions. Wife encouraging patient to listen to RD and answer questions.    Anthropometrics   Height: 66 in  Weight: 218 lbs (bed scale weight 1/19 per [facility] doc)  BMI: 35.2  BMI classification: Obese Class I: 30-34.9kg/m2   IBW: 142 lbs    UBW: 275 lbs > 1 year ago per patient/wife  Weight change: Unintentional weight loss of 57 lbs (20.7%) in > 1 year (between 1-2 years).    Labs reviewed   Labs reviewed: Yes    Medications reviewed   Medications reviewed: Yes  Pertinent: insulin, protonix    Current Nutrition Prescription   PO: NPO Diet    Evaluation of Received Nutrient/Fluid Intake: insufficient data    Nutrition Diagnosis     Problem Inadequate oral intake   Etiology Decreased appetite   Signs/Symptoms Patient and wife report </= 75% of usual PO intake for more than 1 year with resulting unintentional  weight loss of 57 lbs in more than 1 year (between 1-2 years)     Intervention   Intervention: Follow treatment progress, Care plan reviewed, Interview for preferences, Await begin PO, Supplement provided     -Ordered Ensure HP 2x daily to begin when appropriate diet initiated.    Goal:   General: Nutrition support treatment  PO: Initiate diet as medically appropriate    Monitoring/Evaluation:   Monitoring/Evaluation: Per protocol, Pertinent labs, Weight, Skin status    Linda Wick RD  Time Spent: 45 min

## 2021-01-21 NOTE — PLAN OF CARE
VSS. Pt exhibited high blood sugars throughout shift in the 300s-400s range. Medication adjusted per provider. Monitoring blood glucose levels. Administered PRN pain medication per MAR. Will continue to monitor.

## 2021-01-21 NOTE — THERAPY EVALUATION
Patient Name: Shlomo Mcallister  : 1950    MRN: [id number]                              Today's Date: 2021       Admit Date: 2021    Visit Dx:     ICD-10-CM ICD-9-CM   1. Syncope, unspecified syncope type  R55 780.2   2. Hypoglycemia  E16.2 251.2   3. History of pancreatic cancer  Z85.07 V10.09   4. Chest pain, unspecified type  R07.9 786.50   5. History of coronary artery disease  Z86.79 V12.59   6. Elevated troponin  R77.8 790.6     Patient Active Problem List   Diagnosis   • DKA (diabetic ketoacidoses) (CMS/[facility])   • Anemia, chronic disease   • DM2 (diabetes mellitus, type 2) (CMS/[facility])   • Prostate cancer metastatic to bone (CMS/[facility])   • Essential hypertension   • HLD (hyperlipidemia)   • GERD without esophagitis   • CAD (coronary artery disease)   • CHF (congestive heart failure) (CMS/HCC)   • Syncope   • Bilateral conjunctivitis   • NSTEMI (non-ST elevated myocardial infarction) (CMS/HCC)   • Hx of blood clots   • Open wound of foot   • Elevated TSH     Past Medical History:   Diagnosis Date   • Anemia 2021   • CAD (coronary artery disease) 2021   • CHF (congestive heart failure) (CMS/[facility]) 2021   • DM2 (diabetes mellitus, type 2) (CMS/[facility]) 2021   • DVT (deep venous thrombosis) (CMS/HCC)    • GERD without esophagitis 2021   • HLD (hyperlipidemia) 2021     Past Surgical History:   Procedure Laterality Date   • TX REANOMAL CORON ART PA ORIGIN BY GRAFT     • TURP / TRANSURETHRAL INCISION / DRAINAGE PROSTATE       General Information     Row Name 21 0834          Physical Therapy Time and Intention    Document Type  evaluation  -DM     Mode of Treatment  physical therapy  -DM     Row Name 21 0834          General Information    Patient Profile Reviewed  yes  -DM     Prior Level of Function  independent:;gait;transfer;bed mobility;ADL's;driving;all household mobility indep gt w/ R wx; Has bad R hip/leg from met CA  -DM     Existing Precautions/Restrictions   fall;other (see comments) Syncope PTA, w/ L CP,elev trop (? AMI r/o'd; tent.C cancelled 1-21); h/o CA w/ mets to bone, DVT, Sycope 2x/mo.avg;recent hosp [facility] w/ N/V/DKA  -DM     Barriers to Rehab  previous functional deficit bone mets  -DM     Row Name 01/21/21 0834          Living Environment    Lives With  spouse staying w/ aunt in [place] currently  -DM     Row Name 01/21/21 0834          Home Main Entrance    Number of Stairs, Main Entrance  seven has 2 homes;house in [place] w/ 7 ent steps,2 rails; house in [place] W/ 7 steps@front,ramp@back, w/ both tub show. & W.I. shower  -DM     Stair Railings, Main Entrance  railings on both sides of stairs  -DM     Row Name 01/21/21 0834          Cognition    Orientation Status (Cognition)  oriented x 4  -DM     Row Name 01/21/21 0834          Safety Issues, Functional Mobility    Safety Issues Affecting Function (Mobility)  safety precaution awareness;safety precautions follow-through/compliance  -DM     Impairments Affecting Function (Mobility)  endurance/activity tolerance;pain;strength  -DM       User Key  (r) = Recorded By, (t) = Taken By, (c) = Cosigned By    Initials Name Provider Type    DM Migdalia Ortiz, PT Physical Therapist        Mobility     Row Name 01/21/21 0834          Bed Mobility    Bed Mobility  rolling left;rolling right;supine-sit;sit-supine  -DM     Rolling Left Edwards (Bed Mobility)  modified independence  -DM     Rolling Right [place] (Bed Mobility)  modified independence  -DM     Supine-Sit [place] (Bed Mobility)  contact guard  -DM     Sit-Supine [place] (Bed Mobility)  verbal cues;minimum assist (75% patient effort) min A to lift RLE to sup  -DM     Assistive Device (Bed Mobility)  bed rails;head of bed elevated  -DM     Comment (Bed Mobility)  agreed to LE exer EOB;PT issued loaner R wx  -DM     Row Name 01/21/21 0834          Transfers    Comment (Transfers)  decl. transf. d/t R hip/LE pain from CA mets;did transf STS 1-20 W/ R wx  w/ OT (CGA)  -DM     Row Name 01/21/21 0834          Sit-Stand Transfer    Sit-Stand Anderson (Transfers)  unable to assess  -DM     Row Name 01/21/21 0834          Gait/Stairs (Locomotion)    [place] Level (Gait)  unable to assess  -DM     Comment (Gait/Stairs)  Decl. d/t R hip/LE pain, but did amb 20 ft w/ OT (CGA) 1-20 w/ Rwx  -DM       User Key  (r) = Recorded By, (t) = Taken By, (c) = Cosigned By    Initials Name Provider Type    DM Migdalia Ortiz, PT Physical Therapist        Obj/Interventions     Row Name 01/21/21 0834          Range of Motion Comprehensive    General Range of Motion  lower extremity range of motion deficits identified  -DM     Comment, General Range of Motion  R hip seo. 25% d/t bony mets pain  -DM     Row Name 01/21/21 0834          Strength Comprehensive (MMT)    General Manual Muscle Testing (MMT) Assessment  lower extremity strength deficits identified  -DM     Comment, General Manual Muscle Testing (MMT) Assessment  R Hip grossly 3- to 3+/5; R quad 4/5  -DM     Row Name 01/21/21 0834          Motor Skills    Therapeutic Exercise  hip;knee;ankle issued copy of HEP & instructed  -DM     Row Name 01/21/21 0834          Hip (Therapeutic Exercise)    Hip (Therapeutic Exercise)  AROM (active range of motion);isometric exercises;AAROM (active assistive range of motion)  -DM     Hip AROM (Therapeutic Exercise)  bilateral;flexion;extension;aBduction;aDduction;external rotation;internal rotation;supine;sitting;10 repetitions also sit.marches; min A for R hip abd d/t pain  -DM     Hip AAROM (Therapeutic Exercise)  right;aBduction;supine  -DM     Hip Isometrics (Therapeutic Exercise)  bilateral;gluteal sets;supine;10 repetitions also L hip add squeezes w/ pillow  -DM     Row Name 01/21/21 0834          Knee (Therapeutic Exercise)    Knee (Therapeutic Exercise)  AROM (active range of motion)  -DM     Knee AROM (Therapeutic Exercise)  bilateral;flexion;extension;SAQ (short arc quad);LAQ  (long arc quad);heel slides;sitting;supine;10 repetitions  -DM     Row Name 01/21/21 0834          Ankle (Therapeutic Exercise)    Ankle (Therapeutic Exercise)  AROM (active range of motion)  -DM     Ankle AROM (Therapeutic Exercise)  bilateral;dorsiflexion;plantarflexion;supine;10 repetitions & AC  -DM     Row Name 01/21/21 0834          Balance    Balance Assessment  sitting static balance;sitting dynamic balance  -DM     Static Sitting Balance  WNL;unsupported;sitting, edge of bed LE exer; PLB  -DM     Dynamic Sitting Balance  WNL;unsupported;sitting, edge of bed Recip scooting  -DM     Balance Interventions  sitting;static;dynamic;weight shifting activity  -DM       User Key  (r) = Recorded By, (t) = Taken By, (c) = Cosigned By    Initials Name Provider Type    DM Migdalia Ortiz, PT Physical Therapist        Goals/Plan     Row Name 01/21/21 0834          Bed Mobility Goal 1 (PT)    Activity/Assistive Device (Bed Mobility Goal 1, PT)  bed mobility activities, all  -DM     [place] Level/Cues Needed (Bed Mobility Goal 1, PT)  independent  -DM     Time Frame (Bed Mobility Goal 1, PT)  short term goal (STG);3 days  -DM     Row Name 01/21/21 0834          Transfer Goal 1 (PT)    Activity/Assistive Device (Transfer Goal 1, PT)  sit-to-stand/stand-to-sit;bed-to-chair/chair-to-bed;walker, rolling  -DM     [place] Level/Cues Needed (Transfer Goal 1, PT)  independent  -DM     Time Frame (Transfer Goal 1, PT)  short term goal (STG);3 days  -DM     Row Name 01/21/21 0834          Gait Training Goal 1 (PT)    Activity/Assistive Device (Gait Training Goal 1, PT)  gait (walking locomotion);walker, rolling stable VS  -DM     [place] Level (Gait Training Goal 1, PT)  independent  -DM     Distance (Gait Training Goal 1, PT)  75  -DM     Time Frame (Gait Training Goal 1, PT)  short term goal (STG);3 days  -DM     Row Name 01/21/21 0834          Stairs Goal 1 (PT)    Activity/Assistive Device (Stairs Goal 1, PT)   ascending stairs;descending stairs;using handrail, right  -DM     [place] Level/Cues Needed (Stairs Goal 1, PT)  contact guard assist  -DM     Number of Stairs (Stairs Goal 1, PT)  7  -DM     Time Frame (Stairs Goal 1, PT)  short term goal (STG);3 days  -DM     Row Name 01/21/21 0834          Patient Education Goal (PT)    Activity (Patient Education Goal, PT)  HEP exer  -DM     Henry/Cues/Accuracy (Memory Goal 2, PT)  demonstrates adequately;verbalizes understanding  -DM     Time Frame (Patient Education Goal, PT)  short term goal (STG);3 days  -DM     Progress/Outcome (Patient Education Goal, PT)  goal met  -DM       User Key  (r) = Recorded By, (t) = Taken By, (c) = Cosigned By    Initials Name Provider Type    DM Migdalia Ortiz, PT Physical Therapist        Clinical Impression     Row Name 01/21/21 0834          Pain    Additional Documentation  Pain Scale: FACES Pre/Post-Treatment (Group)  -DM     Row Name 01/21/21 0834          Pain Scale: Numbers Pre/Post-Treatment    Pain Intervention(s)  Repositioned;Elevated;Rest  -DM     Row Name 01/21/21 0834          Pain Scale: FACES Pre/Post-Treatment    Pain: FACES Scale, Pretreatment  6-->hurts even more  -DM     Posttreatment Pain Rating  8-->hurts whole lot  -DM     Pain Location - Side  Right  -DM     Pain Location - Orientation  lower  -DM     Pain Location  extremity Dominique.R hip  -DM     Row Name 01/21/21 0834          Plan of Care Review    Plan of Care Reviewed With  patient  -DM     Progress  improving  -DM     Outcome Summary  PT eval completed. Presents w/ syncopal episode, L CP & elev trop.(presumably d/t metabolic factors;? AMI r/o'd & tent. [facility] cancelled 1-21), h/o CA w/bony mets causing RLE pain/limited mvmt, decr LE strength/endurance & impaired funct mobil. Performed rolling L/R indep, transf to sitting EOB w/ CGA, LE ther exer sup & sit w/ occ rests d/t incr RLE discomf., sit to sup transf w/ min A to lift RLE, but declined STS and gt w/  shlomo R wx d/t RLE pain. Noted signif decr BP EOB vs sup, pale appearance/low HGB (9.0),& HR to 98. Pt plans d/c home this PM since [facility] cancelled, but other MD will need to sign off.If pt not d/c'd, will attempt to amb next session.Is not interested in PT f/u intervention at d/c.  -DM     Row Name 01/21/21 0834          Therapy Assessment/Plan (PT)    Patient/Family Therapy Goals Statement (PT)  impaired funct mobil  -DM     Rehab Potential (PT)  good, to achieve stated therapy goals  -DM     Criteria for Skilled Interventions Met (PT)  yes;meets criteria;skilled treatment is necessary  -DM     Row Name 01/21/21 0834          Vital Signs    Pre Systolic BP Rehab  148  -DM     Pre Treatment Diastolic BP  83  -DM     Post Systolic BP Rehab  125  -DM     Post Treatment Diastolic BP  70  -DM     Pretreatment Heart Rate (beats/min)  89  -DM     Intratreatment Heart Rate (beats/min)  98  -DM     Posttreatment Heart Rate (beats/min)  87  -DM     Pre SpO2 (%)  97  -DM     O2 Delivery Pre Treatment  room air  -DM     Intra SpO2 (%)  95  -DM     O2 Delivery Intra Treatment  room air  -DM     Post SpO2 (%)  96  -DM     O2 Delivery Post Treatment  room air  -DM     Pre Patient Position  Supine  -DM     Intra Patient Position  Sitting  -DM     Post Patient Position  Supine  -DM     Row Name 01/21/21 0834          Positioning and Restraints    Pre-Treatment Position  in bed  -DM     Post Treatment Position  bed  -DM     In Bed  notified nsg;supine;call light within reach;encouraged to call for assist;exit alarm on;legs elevated  -DM       User Key  (r) = Recorded By, (t) = Taken By, (c) = Cosigned By    Initials Name Provider Type    Migdalia De La Paz, PT Physical Therapist        Outcome Measures     Row Name 01/21/21 0834          How much help from another person do you currently need...    Turning from your back to your side while in flat bed without using bedrails?  4  -DM     Moving from lying on back to sitting on the  side of a flat bed without bedrails?  3  -DM     Moving to and from a bed to a chair (including a wheelchair)?  3  -DM     Standing up from a chair using your arms (e.g., wheelchair, bedside chair)?  3  -DM     Climbing 3-5 steps with a railing?  3  -DM     To walk in hospital room?  3  -DM     AM-PAC 6 Clicks Score (PT)  19  -DM     Row Name 01/21/21 0834          Functional Assessment    Outcome Measure Options  AM-PAC 6 Clicks Basic Mobility (PT)  -DM       User Key  (r) = Recorded By, (t) = Taken By, (c) = Cosigned By    Initials Name Provider Type    DM Migdalia Ortiz, PT Physical Therapist        Physical Therapy Education                 Title: PT OT SLP Therapies (In Progress)     Topic: Physical Therapy (Done)     Point: Mobility training (Done)     Learning Progress Summary           Patient Acceptance, E,D,H, DU,VU by DM at 1/21/2021 1032                   Point: Home exercise program (Done)     Learning Progress Summary           Patient Acceptance, E,D,H, DU,VU by DM at 1/21/2021 1032                   Point: Body mechanics (Done)     Learning Progress Summary           Patient Acceptance, E,D,H, DU,VU by DM at 1/21/2021 1032                   Point: Precautions (Done)     Learning Progress Summary           Patient Acceptance, E,D,H, DU,VU by DM at 1/21/2021 1032                               User Key     Initials Effective Dates Name Provider Type Discipline    DM 06/19/15 -  Migdalia Ortiz, PT Physical Therapist PT              PT Recommendation and Plan  Planned Therapy Interventions (PT): balance training, bed mobility training, gait training, home exercise program, stair training, strengthening, transfer training  Plan of Care Reviewed With: patient  Progress: improving  Outcome Summary: PT eval completed. Presents w/ syncopal episode, L CP & elev trop.(presumably d/t metabolic factors;? AMI r/o'd & tent. [facility] cancelled 1-21), h/o CA w/bony mets causing RLE pain/limited mvmt, decr LE  strength/endurance & impaired funct mobil. Performed rolling L/R indep, transf to sitting EOB w/ CGA, LE ther exer sup & sit w/ occ rests d/t incr RLE discomf., sit to sup transf w/ min A to lift RLE, but declined STS and gt w/ loaner R wx d/t RLE pain. Noted signif decr BP EOB vs sup, pale appearance/low HGB (9.0),& HR to 98. Pt plans d/c home this PM since [facility] cancelled, but other MD will need to sign off.If pt not d/c'd, will attempt to amb next session.Is not interested in PT f/u intervention at d/c.     Time Calculation:   PT Charges     Row Name 01/21/21 1033             Time Calculation    Start Time  0834  -DM      PT Received On  01/21/21  -DM      PT Goal Re-Cert Due Date  01/31/21  -DM         Time Calculation- PT    Total Timed Code Minutes- PT  45 minute(s)  -DM         Timed Charges    81991 - PT Therapeutic Exercise Minutes  19  -DM      69805 - PT Therapeutic Activity Minutes  26  -DM        User Key  (r) = Recorded By, (t) = Taken By, (c) = Cosigned By    Initials Name Provider Type    DM Migdalia Ortiz, PT Physical Therapist        Therapy Charges for Today     Code Description Service Date Service Provider Modifiers Qty    02142866221 HC PT THER PROC EA 15 MIN 1/21/2021 Migdalia Ortiz, PT GP 1    07000745052 HC PT THERAPEUTIC ACT EA 15 MIN 1/21/2021 Migdalia Ortiz, PT GP 2    25882626964 HC PT EVAL MOD COMPLEXITY 4 1/21/2021 Migdalia Ortiz, PT GP 1          PT G-Codes  Outcome Measure Options: AM-PAC 6 Clicks Basic Mobility (PT)  AM-PAC 6 Clicks Score (PT): 24  AM-PAC 6 Clicks Score (OT): 21    Migdalia Ortiz PT  1/21/2021

## 2021-01-21 NOTE — PROGRESS NOTES
[facility] Medicine Services  Clinical Decision Unit  PROGRESS NOTE    Patient Name: Shlomo Andres  : 1950  MRN: [id number]    Admission Date and Time: 2021  2:33 PM  Primary Care Physician: Provider, No Known    Subjective   Subjective     CC:  CP and syncope, hypoglycemic episode    HPI:  No further angina symptoms.    Patient has extremely brittle diabetes.  He was admitted with hypoglycemia, now having persistent hyperglycemia despite escalation in insulin dosings.    Have discussed with pharmacy, will restart U5 100 insulin starting this evening at a reduced dose from his prior home doses and monitor....      Review of Systems  Gen- No fevers, chills, HA, uncontrolled pain  CV- No current chest pain, palpitations, new edema  Resp- No SOA, cough  GI- No N/V/D, abd pain, constipation  Skin - No rash        Objective   Objective     Vital Signs:   Temp:  [98.3 °F (36.8 °C)-99 °F (37.2 °C)] 98.6 °F (37 °C)  Heart Rate:  [89-98] 93  Resp:  [16-18] 18  BP: (118-148)/(62-86) 118/62     Physical Exam:  Constitutional: No acute distress, awake, alert, nontoxic, obese body habitus  Respiratory: Dull bases, poor effort, nonlabored respirations   Cardiovascular: RRR, no murmur  Musculoskeletal: Trace peripheral edema, normal muscle tone for age  Psychiatric: Appropriate affect, good insight and judgement, cooperative      Result Review:  IResults for SHLOMO ANDRES (MRN [id number]) as of 2021 09:58   Ref. Range 2021 03:25 2021 04:37 2021 07:18   Glucose Latest Ref Range: 70 - 130 mg/dL 195 (H) 239 (H) 265 (H)   Results for SHLOMO ANDRES (MRN [id number]) as of 2021 09:58   Ref. Range 2021 04:37   Cortisol - AM Latest Units: mcg/dL 17.79   Results for SHLOMO ANDRES (MRN [id number]) as of 2021 09:58   Ref. Range 2021 14:51 2021 21:42   Troponin T Latest Ref Range: 0.000 - 0.030 ng/mL 0.047 (C) 0.010     Assessment/Plan   Assessment /  Plan     Active Hospital Problems    Diagnosis  POA   • **Syncope [R55]  Yes   • Bilateral conjunctivitis [H10.9]  Yes   • NSTEMI (non-ST elevated myocardial infarction) (CMS/[facility]) [I21.4]  Yes   • Hx of blood clots [Z86.718]  Not Applicable   • CHF (congestive heart failure) (CMS/[facility]) [I50.9]  Unknown   • Anemia, chronic disease [D63.8]  Yes   • CAD (coronary artery disease) [I25.10]  Yes   • DM2 (diabetes mellitus, type 2) (CMS/[facility]) [E11.9]  Yes   • Essential hypertension [I10]  Yes   • GERD without esophagitis [K21.9]  Yes   • HLD (hyperlipidemia) [E78.5]  Yes   • Prostate cancer metastatic to bone (CMS/[facility]) [C61, C79.51]  Yes   • Open wound of foot [S91.309A]  Unknown   • Elevated TSH [R79.89]  Yes      Resolved Hospital Problems   No resolved problems to display.        Brief course to date:  Shlomo Mcallister is a 70 y.o. male with CAD s/p CABG, CHF unknown EF, uncontrolled DM2, prior DVT/PE on Xarelto, metastatic prostate cancer usually treated in [place] but being referred to .  He was admitted here 1/16/2021 for DKA and discharged 1/18/2021, he has recurring hypoglycemic episodes chronically due to brittle diabetes.  Brought to [facility] ED for anginal chest pain and syncopal spell, found to be hypoglycemic on evaluation as well as have initial troponin elevation which subsequently trended down.    Plan:  - using IV regular insulin push doses to get his FSBS down this afternoon, will check FSBS every 2-3hrs and dose again if remains >300  - continue high dose SSI  - stop Levemir and transition to his home u500 insulin decrease from 35 units to 30 units BID (since at admission was hypoglycemic and states he was often hypoglycemic to 50's at home on u500)  - he has failed multiple DM regimens in past including many of the new med classes and ultimately specifically Rx'd u500 after much outpatient trials of other insulin regimens, follows with his doctors in [facility]  - Cardiology increased Coreg but is deferring  any ischemic eval at this time for his chronic angina    DVT Prophylaxis:  Xarelto    Discharge readiness criteria:   1. Brittle diabetic, FSBS's do not need to be perfect for discharge but at least need to be reliably within an reasonable range.  Today we are reinitiating his home U500 twice daily insulin at a reduced dose and will monitor overnight and with morning FSBS to determine if stable enough for discharge.  2. Please note: Patient lives in [facility] and has all of his care there.  He has known foot ulcers that he has not received care for locally and has refused in the past, he also has a cardiologist that he follows with regularly in [facility] whom he plans to follow-up with after discharge.    Planning for home tomorrow as long as FSBS within acceptable range, hopefully ~100-200's would be ideal but I'm unsure he can be that tightly controlled.       Ligia Carrillo MD  01/21/21

## 2021-01-21 NOTE — PROGRESS NOTES
"[facility] at [facility]  IP Progress Note      Chief Complaint: Follow-up of chest pain/CAD.    Subjective   Feels tired and fatigued but denies any chest pain.  No shortness of breath.  States that he mostly just lays around at the house and has a very sedentary lifestyle.  He part-time lives in [place] and part-time in [place], in [place] he stays with his son.  He does not drive and his wife drove him from [place].  He is waiting to see [facility] for continuation of chemotherapy if of metastatic prostate cancer.  States that he has had chest pain for 6 months, after coming to [place] he felt poorly and worse and decided to come to the hospital.  Has no chest pain this morning.    Objective     Blood pressure 148/83, pulse 89, temperature 98.5 °F (36.9 °C), temperature source Oral, resp. rate 18, height 167.6 cm (65.98\"), weight 96.3 kg (212 lb 3.2 oz), SpO2 96 %.     Intake/Output Summary (Last 24 hours) at 1/21/2021 0827  Last data filed at 1/21/2021 0412  Gross per 24 hour   Intake --   Output 1825 ml   Net -1825 ml       Physical Exam:  General: No acute distress.   Neck: no JVD.  Chest:No respiratory distress, breath sounds are normal. No wheezes,  rhonchi or rales.  Cardiovascular: Normal S1 and S2, no murmer, gallop or rub.    Extremities: No edema.  Toe ulcerations and scabs with mild cellulitis.    Results Review:     I reviewed the patient's new clinical results.    Results from last 7 days   Lab Units 01/21/21  0417   WBC 10*3/mm3 5.09   HEMOGLOBIN g/dL 9.0*   HEMATOCRIT % 30.4*   PLATELETS 10*3/mm3 192     Results from last 7 days   Lab Units 01/21/21  0417  01/19/21  1451   SODIUM mmol/L 133*   < > 141   POTASSIUM mmol/L 4.1   < > 3.2*   CHLORIDE mmol/L 98   < > 103   CO2 mmol/L 23.0   < > 25.0   BUN mg/dL 14   < > 12   CREATININE mg/dL 0.88   < > 1.18   CALCIUM mg/dL 8.6   < > 9.1   BILIRUBIN mg/dL  --   --  0.3   ALK PHOS U/L  --   --  696*   ALT (SGPT) U/L  --   --  " 7   AST (SGOT) U/L  --   --  34   GLUCOSE mg/dL 380*   < > 58*    < > = values in this interval not displayed.     Results from last 7 days   Lab Units 01/21/21  0417   SODIUM mmol/L 133*   POTASSIUM mmol/L 4.1   CHLORIDE mmol/L 98   CO2 mmol/L 23.0   BUN mg/dL 14   CREATININE mg/dL 0.88   GLUCOSE mg/dL 380*   CALCIUM mg/dL 8.6     Results from last 7 days   Lab Units 01/19/21  1635   INR  1.43*     Lab Results   Component Value Date    TROPONINI 0.16 02/01/2014    TROPONINT 0.010 01/19/2021     Results from last 7 days   Lab Units 01/19/21  1451   TSH uIU/mL 6.250*   FREE T4 ng/dL 1.41               Tele: Sinus Rythym      Assessment:  1. Chest pain, resolved, no evidence of ACS or acute MI.  Normal EF of 55% by echocardiogram.  2. Near syncope/syncope, probably orthostatic and triggered by generalized weakness and lethargy.  Doubt cardiac arrhythmia.  3. CAD, status post previous CABG and interventions.  Not even on optimal antianginal medical therapy.  4. Hypertension.  5. Dyslipidemia, well controlled.  6. Type 2 diabetes, on insulin.  7. Anemia.  8. History of pulmonary embolism.  9. Metastatic prostate cancer.    Plan:  1. Do not think cardiac catheterization is indicated or in his best interest, risk/benefit not favorable.  If he complains of any recurrent chest pain that does not respond to optimal medical therapy a cardiac PET scan can be considered for ischemic evaluation.  His previous angiographic evaluations have shown adequate revascularization.   2. At this time best strategy would be to optimize his medical therapy.  3. Continue low-dose aspirin, restart Xarelto at discharge.  4. Not adequately beta blocked, will increase carvedilol and continue isosorbide.  In case of recurrent/ongoing chest pain Ranexa can be added.  5. It will be worthwhile to know the extent of his metastatic cancer and reinitiate further management/treatment.  6. Management of foot ulcers per hospitalist, may benefit from wound  care nurse evaluation.  7. Evaluate anemia, may benefit from iron infusion.  8. Treatment of hypothyroidism per hospitalist.  9. Dr. Delong will see him for follow-up tomorrow.  10. I am canceling scheduled cardiac ablation study, patient can resume diet.    Mayo Guerrero MD, FACC, [facility]

## 2021-01-21 NOTE — PLAN OF CARE
VSS. No complaints of pain. Room air. Glucose 573 last night, scheduled Levemir and PRN humulin given. Cara DANGELO notified. Glucose 349 this AM. Left heart cath 1100 today.    Goal Outcome Evaluation:  Plan of Care Reviewed With: patient  Progress: improving     Problem: Fall Injury Risk  Goal: Absence of Fall and Fall-Related Injury  Outcome: Ongoing, Progressing  Intervention: Identify and Manage Contributors to Fall Injury Risk  Recent Flowsheet Documentation  Taken 1/20/2021 2000 by Jasen Taylor, RN  Medication Review/Management: medications reviewed  Intervention: Promote Injury-Free Environment  Recent Flowsheet Documentation  Taken 1/21/2021 0200 by Jasen Taylor, RN  Safety Promotion/Fall Prevention:   activity supervised   assistive device/personal items within reach  Taken 1/21/2021 0000 by Jasen Taylor, RN  Safety Promotion/Fall Prevention:   activity supervised   assistive device/personal items within reach  Taken 1/20/2021 2200 by Jasen Taylor, RN  Safety Promotion/Fall Prevention:   activity supervised   assistive device/personal items within reach  Taken 1/20/2021 2000 by Jasen Taylor, RN  Safety Promotion/Fall Prevention:   activity supervised   assistive device/personal items within reach

## 2021-01-21 NOTE — PLAN OF CARE
Problem: Adult Inpatient Plan of Care  Goal: Plan of Care Review  Recent Flowsheet Documentation  Taken 1/21/2021 0834 by Migdalia Ortiz, PT  Progress: improving  Plan of Care Reviewed With: patient  Outcome Summary:   PT eval completed. Presents w/ syncopal episode, L CP & elev trop.(presumably d/t metabolic factors   ? AMI r/o'd & tent. [facility] cancelled 1-21), h/o CA w/bony mets causing RLE pain/limited mvmt, decr LE strength/endurance & impaired funct mobil. Performed rolling L/R indep, transf to sitting EOB w/ CGA, LE ther exer sup & sit w/ occ rests d/t incr RLE discomf., sit to sup transf w/ min A to lift RLE, but declined STS and gt w/ loaner R wx d/t RLE pain. Noted signif decr BP EOB vs sup, pale appearance/low HGB (9.0),& HR to 98. Pt plans d/c home this PM since [facility] cancelled, but other MD will need to sign off.If pt not d/c'd, will attempt to amb next session.Is not interested in PT f/u intervention at d/c.   Goal Outcome Evaluation:  Plan of Care Reviewed With: patient  Progress: improving  Outcome Summary: PT eval completed. Presents w/ syncopal episode, L CP & elev trop.(presumably d/t metabolic factors;? AMI r/o'd & tent. [facility] cancelled 1-21), h/o CA w/bony mets causing RLE pain/limited mvmt, decr LE strength/endurance & impaired funct mobil. Performed rolling L/R indep, transf to sitting EOB w/ CGA, LE ther exer sup & sit w/ occ rests d/t incr RLE discomf., sit to sup transf w/ min A to lift RLE, but declined STS and gt w/ loaner R wx d/t RLE pain. Noted signif decr BP EOB vs sup, pale appearance/low HGB (9.0),& HR to 98. Pt plans d/c home this PM since [facility] cancelled, but other MD will need to sign off.If pt not d/c'd, will attempt to amb next session.Is not interested in PT f/u intervention at d/c.

## 2021-01-22 NOTE — PROGRESS NOTES
Continued Stay Note  [facility]     Patient Name: Shlomo Mcallister  MRN: [id number]  Today's Date: 1/22/2021    Admit Date: 1/19/2021    Discharge Plan     Row Name 01/22/21 1616       Plan    Plan  Home at DC    Patient/Family in Agreement with Plan  other (see comments)    Plan Comments  I did not wake. No new DC needs identified at this time.    Final Discharge Disposition Code  01 - home or self-care        Discharge Codes    No documentation.       Expected Discharge Date and Time     Expected Discharge Date Expected Discharge Time    Jan 21, 2021             Cortney Goodson RN

## 2021-01-22 NOTE — PROGRESS NOTES
Clinical Nutrition   Reason For Visit: Follow-up protocol    Patient Name: Shlomo Mcallister  YOB: 1950  MRN: [id number]  Date of Encounter: 01/22/21 10:45 EST  Admission date: 1/19/2021      Nutrition Assessment     Admission Problem List:  Fall  Chest pain  Elevated troponin  Hypokalemia  Anemia  Bilateral conjunctivitis  Foot ulcers - WOC eval pending  Hypoglycemic episodes, brittle diabetes      Applicable PMH:  HTN, HLD  CAD s/p CABG  CHF  T2DM  DVT  PE  Metastatic prostate cancer s/p TURP and chemotherapy      Reported/Observed/Food/Nutrition Related History     Pt reports that he is feeling tired today, but wants to go home. States that he is tolerating solid foods and that his appetite is fair, no N/V. Reports that he has not tried the Ensure HP supplements yet. Pt thought that the Ensure HP supplements had 50 g carbohydrates per supplement. RD discussed with pt that there are 19 g of carbohydrates per supplement, RD showed pt on the nutrition facts label. Pt states that he will try the supplement and look into getting them when he is d/c'd home. Reports that he is not a picky eater and does not have any food preferences at this time.       Anthropometrics   Height: 66 in  Weight: 218 lbs (bed scale weight 1/19 per [facility] doc)  BMI: 35.2  BMI classification: Obese Class I: 30-34.9kg/m2   IBW: 142 lbs    Per previous RD note (1/20):  UBW: 275 lbs > 1 year ago per patient/wife  Weight change: Unintentional weight loss of 57 lbs (20.7%) in > 1 year (between 1-2 years).    Labs reviewed   Labs reviewed: Yes    Medications reviewed   Medications reviewed: Yes  Pertinent: lasix, neurontin, insulin, protonix  PRN: tylenol, melatonin, oxycodone    Current Nutrition Prescription   PO: Diet Regular; Cardiac, Consistent Carbohydrate   Oral nutrition supplement: Ensure HP 2x/day    Evaluation of Received Nutrient/Fluid Intake: insufficient data    Nutrition Diagnosis   1/20, updated 1/22  Problem Inadequate  oral intake   Etiology Decreased appetite   Signs/Symptoms Patient and wife report </= 75% of usual PO intake for more than 1 year with resulting unintentional weight loss of 57 lbs in more than 1 year (between 1-2 years)   Status: diet advanced to solid foods, however no PO intake has been documented since admission    Intervention   Intervention: Follow treatment progress, Care plan reviewed, Interview for preferences, Encourage intake       Goal:   General: Nutrition support treatment  PO: Establish PO     Monitoring/Evaluation:   Monitoring/Evaluation: Per protocol, PO intake, Supplement intake, Pertinent labs, Weight, Skin status    Beckie Savage, MS RD/LD CNSC  Time Spent: 20 minutes

## 2021-01-22 NOTE — SIGNIFICANT NOTE
Called per nursing secondary to . Hold am dose of U500 due to concern for hypoglycemia. Will plan for continued am sliding scale for now.

## 2021-01-22 NOTE — PROGRESS NOTES
"[facility] Cardiology Daily Note       LOS: 0 days   Patient Care Team:  Provider, No Known as PCP - General  Provider, No Known as PCP - Family Medicine    Chief Complaint:  Chest pain; syncope; CAD    Subjective     Subjective: Occasional brief chest pains.  Seems to be worse with breathing.  No acute issues overnight.  Blood pressure currently low.  Initially systolic of 81 mmHg now systolic is 97 mmHg.  No further syncope or presyncope but the patient does not feel well this morning.      Review of Systems:   As above.    Medications:  aspirin, 81 mg, Oral, Daily  carvedilol, 12.5 mg, Oral, BID With Meals  carvedilol, 6.25 mg, Oral, Once  DULoxetine, 60 mg, Oral, Daily  furosemide, 20 mg, Oral, Daily  gabapentin, 400 mg, Oral, TID  insulin lispro, 0-14 Units, Subcutaneous, 4x Daily With Meals & Nightly  insulin regular, 30 Units, Subcutaneous, BID  isosorbide mononitrate, 60 mg, Oral, Q24H  pantoprazole, 40 mg, Oral, Daily  pravastatin, 40 mg, Oral, Daily  sodium chloride, 10 mL, Intravenous, Q12H  trimethoprim-polymyxin b, 1 drop, Both Eyes, Q4H        Objective     Vital Sign Min/Max for last 24 hours  Temp  Min: 98 °F (36.7 °C)  Max: 98.7 °F (37.1 °C)   BP  Min: 97/56  Max: 162/84   Pulse  Min: 64  Max: 93   Resp  Min: 16  Max: 18   SpO2  Min: 94 %  Max: 98 %   No data recorded   Weight  Min: 96.3 kg (212 lb 4.8 oz)  Max: 96.3 kg (212 lb 4.8 oz)      Intake/Output Summary (Last 24 hours) at 1/22/2021 0822  Last data filed at 1/22/2021 0320  Gross per 24 hour   Intake --   Output 300 ml   Net -300 ml        Flowsheet Rows      First Filed Value   Admission Height  170.2 cm (67\") Documented at 01/19/2021 1437   Admission Weight  99.8 kg (220 lb) Documented at 01/19/2021 1437          Physical Exam:    General: Alert and oriented   Cardiovascular: Heart has a nondisplaced focal PMI. Regular rate and rhythm without murmur, gallop or rub.  Lungs: Clear without rales or wheezes. Equal expansion is " noted.   Extremities: Show no edema.  Skin: warm and dry.  Neurologic: nonfocal       Results Review:    I reviewed the patient's new clinical results.  EKG:  Tele: NSR    Labs:    Results from last 7 days   Lab Units 01/21/21 0417 01/20/21 2224 01/20/21 0437 01/19/21  1451  01/16/21  2251 01/16/21  1502   SODIUM mmol/L 133* 132* 141 141   < > 131* 134*   POTASSIUM mmol/L 4.1 4.4 4.2 3.2*   < > 3.8 4.4   CHLORIDE mmol/L 98 97* 105 103   < > 94* 93*   CO2 mmol/L 23.0 24.0 26.0 25.0   < > 18.0* 21.0*   BUN mg/dL 14 15 13 12   < > 15 14   CREATININE mg/dL 0.88 0.99 1.08 1.18   < > 0.80 0.86   CALCIUM mg/dL 8.6 8.6 8.7 9.1   < > 8.7 9.2   BILIRUBIN mg/dL  --   --   --  0.3  --  0.3 0.3   ALK PHOS U/L  --   --   --  696*  --  536* 574*   ALT (SGPT) U/L  --   --   --  7  --  8 6   AST (SGOT) U/L  --   --   --  34  --  22 27   GLUCOSE mg/dL 380* 498* 239* 58*   < > 386* 414*    < > = values in this interval not displayed.     Results from last 7 days   Lab Units 01/21/21 0417 01/20/21 0437 01/19/21  1451   WBC 10*3/mm3 5.09 5.41 6.26   HEMOGLOBIN g/dL 9.0* 9.9* 10.6*   HEMATOCRIT % 30.4* 33.2* 34.6*   PLATELETS 10*3/mm3 192 205 252     Lab Results   Component Value Date    TROPONINI 0.16 02/01/2014    TROPONINT 0.010 01/19/2021    TROPONINT 0.047 (C) 01/19/2021    TROPONINT 0.015 01/16/2021     No results found for: CHOL  Lab Results   Component Value Date    TRIG 151 (H) 10/28/2015     Lab Results   Component Value Date    HDL 44 10/28/2015     No components found for: LDLCALC  Lab Results   Component Value Date    INR 1.43 (H) 01/19/2021    INR 1.10 05/29/2015    INR 1.20 04/07/2015    PROTIME 17.1 (H) 01/19/2021    PROTIME 11.6 (H) 05/29/2015    PROTIME 12.8 (H) 04/07/2015         Echo:    · Estimated left ventricular EF = 55% Left ventricular systolic function is normal.  · Normal valvular structure and function    Assessment   Assessment:    1. Syncope  1. Probably orthostatic.  no source of syncope noted on  echo; no rhythm issues to explain syncopal event.  2. Orthostatic hypotension with a fall from 181-155 systolic on standing  3. CAD, status post CABG and coronary stents  4. Mildly elevated troponin  chest pain  5. Hypertension, controlled  6. Hyperlipidemia  7. History of bilateral pulmonary emboli  8. Type 2 diabetes  9. Anemia    Plan:    1. Continue aspirin 81 mg for coronary disease  2. Resume Xarelto for history of pulmonary embolus  3. Optimize medical therapy for coronary disease.  If the patient continues to have angina despite optimal medical therapy a PET scan could be considered to localize the potential area of ischemia.  4. Continue carvedilol for coronary disease.  5. Consider ACEI however the patient is orthostatic with a drop in pressure on January 20 from 181-155 with standing.  6. Discontinue Imdur due to orthostasis and systolic pressure this morning of 81 to 97 mmHg.  7. Begin Ranexa 500 mg every 12  8. Continue statin therapy.  9. Follow-up with cardiologist in [place]  10. Order lipid profile         Electronically signed by LORETO Mendoza, 01/22/21, 8:28 AM EST.      I Sissy Delong MD personally performed the services described in this documentation as scribed by the above individual in my presence, and it is both accurate and complete.    Sissy Delong MD, [facility]

## 2021-01-22 NOTE — PLAN OF CARE
VS stable. Blood glucose continues to be elevated today. Insulin adjusted U-500, 20 units BID added and one time dose of 10 units IV regular insulin given.  No complaints of chest pain noted at this time. Will continue to monitor.  Problem: Fall Injury Risk  Goal: Absence of Fall and Fall-Related Injury  Outcome: Ongoing, Progressing

## 2021-01-22 NOTE — PROGRESS NOTES
[facility] Medicine Services  Clinical Decision Unit  PROGRESS NOTE    Patient Name: Shlomo Andres  : 1950  MRN: [id number]    Admission Date and Time: 2021  2:33 PM  Primary Care Physician: Provider, No Known    Subjective   Subjective     CC:  CP and syncope, hypoglycemic episode    HPI:  Has only experienced a small amt of chest aching when his blood pressures dropped this morning. Started two new meds and has felt terrible today due to low pressures. Wife at bedside. NAD.       Review of Systems  Gen- No fevers, chills  CV- No chest pain, palpitations  Resp- No cough, dyspnea  GI- No N/V/D, abd pain       Objective   Objective     Vital Signs:   Temp:  [98 °F (36.7 °C)-98.7 °F (37.1 °C)] 98.1 °F (36.7 °C)  Heart Rate:  [64-91] 75  Resp:  [16] 16  BP: ()/(42-84) 97/57     Physical Exam:    Constitutional: Awake, alert, chronically ill appearing   Eyes: PERRLA, sclerae anicteric, no conjunctival injection  HENT: NCAT, mucous membranes moist  Respiratory: Clear to auscultation bilaterally, nonlabored respirations   Cardiovascular: RRR, no murmur   Gastrointestinal: Positive bowel sounds, soft, nontender, nondistended  Musculoskeletal: No bilateral ankle edema   Psychiatric: Appropriate affect, cooperative  Neurologic: Oriented x 3, MEDEROS, speech clear  Skin: No rashes        Result Review:  IResults for SHLOMO ANDRES (MRN [id number]) as of 2021 09:58   Ref. Range 2021 03:25 2021 04:37 2021 07:18   Glucose Latest Ref Range: 70 - 130 mg/dL 195 (H) 239 (H) 265 (H)   Results for SHLOMO ANDRES (MRN [id number]) as of 2021 09:58   Ref. Range 2021 04:37   Cortisol - AM Latest Units: mcg/dL 17.79   Results for SHLOMO ANDRES (MRN [id number]) as of 2021 09:58   Ref. Range 2021 14:51 2021 21:42   Troponin T Latest Ref Range: 0.000 - 0.030 ng/mL 0.047 (C) 0.010     Assessment/Plan   Assessment / Plan     Active Hospital Problems     Diagnosis  POA   • **Syncope [R55]  Yes   • Bilateral conjunctivitis [H10.9]  Yes   • NSTEMI (non-ST elevated myocardial infarction) (CMS/[facility]) [I21.4]  Yes   • Hx of blood clots [Z86.718]  Not Applicable   • CHF (congestive heart failure) (CMS/[facility]) [I50.9]  Unknown   • Anemia, chronic disease [D63.8]  Yes   • CAD (coronary artery disease) [I25.10]  Yes   • DM2 (diabetes mellitus, type 2) (CMS/[facility]) [E11.9]  Yes   • Essential hypertension [I10]  Yes   • GERD without esophagitis [K21.9]  Yes   • HLD (hyperlipidemia) [E78.5]  Yes   • Prostate cancer metastatic to bone (CMS/[facility]) [C61, C79.51]  Yes   • Open wound of foot [S91.309A]  Unknown   • Elevated TSH [R79.89]  Yes      Resolved Hospital Problems   No resolved problems to display.        Brief course to date:  Shlomo Mcallister is a 70 y.o. male with CAD s/p CABG, CHF unknown EF, uncontrolled DM2, prior DVT/PE on Xarelto, metastatic prostate cancer usually treated in [place] but being referred to .  He was admitted here 1/16/2021 for DKA and discharged 1/18/2021, he has recurring hypoglycemic episodes chronically due to brittle diabetes.  Brought to [facility] ED for anginal chest pain and syncopal spell, found to be hypoglycemic on evaluation as well as have initial troponin elevation which subsequently trended down.    Plan:  - had to use IV insulin again this afternoon for hyperglycemia, appears U500 was held early this morning, needs to continue to receive BID   - continue high dose SSI  - stop Levemir and transition to his home u500 insulin decrease from 30 to 20 units BID (since at admission was hypoglycemic and states he was often hypoglycemic to 50's at home on u500)  - he has failed multiple DM regimens in past including many of the new med classes and ultimately specifically Rx'd u500 after much outpatient trials of other insulin regimens, follows with his doctors in [facility]  - Cardiology increased Coreg but is deferring any ischemic eval at this time for  his chronic angina, additionally added Ranexa and stopping Imdur     DVT Prophylaxis:  Xarelto    Discharge readiness criteria:   1. Brittle diabetic, FSBS's do not need to be perfect for discharge but at least need to be reliably within an reasonable range hopefully 100-200's.   2. Stable blood pressures   3. Please note: Patient lives in [facility] and has all of his care there.  He has known foot ulcers that he has not received care for locally and has refused in the past, he also has a cardiologist that he follows with regularly in [facility] whom he plans to follow-up with after discharge.    Home with blood pressures and blood sugars are stable       Seda West, APRN  01/22/21

## 2021-01-22 NOTE — PLAN OF CARE
VSS. Pt denies any chest pain. PRN pain medicine given for bilateral hip pain. Room air. Glucose 211 this AM, provider notified, U500 held.    Goal Outcome Evaluation:  Plan of Care Reviewed With: patient  Progress: improving

## 2021-01-23 NOTE — DISCHARGE INSTRUCTIONS
Near-Syncope  Near-syncope is when you suddenly get weak or dizzy, or you feel like you might pass out (faint). This may also be called presyncope. This is due to a lack of blood flow to the brain. During an episode of near-syncope, you may:  · Feel dizzy, weak, or light-headed.  · Feel sick to your stomach (nauseous).  · See all white or all black.  · See spots.  · Have cold, clammy skin.  This condition is caused by a sudden decrease in blood flow to the brain. This decrease can result from various causes, but most of those causes are not dangerous. However, near-syncope may be a sign of a serious medical problem, so it is important to seek medical care.  Follow these instructions at home:  Medicines  · Take over-the-counter and prescription medicines only as told by your doctor.  · If you are taking blood pressure or heart medicine, get up slowly and spend many minutes getting ready to sit and then stand. This can help with dizziness.  General instructions  · Be aware of any changes in your symptoms.  · Talk with your doctor about your symptoms. You may need to have testing to find the cause of your near-syncope.  · If you start to feel like you might pass out, lie down right away. Raise (elevate) your feet above the level of your heart. Breathe deeply and steadily. Wait until all of the symptoms are gone.  · Have someone stay with you until you feel stable.  · Do not drive, use machinery, or play sports until your doctor says it is okay.  · Drink enough fluid to keep your pee (urine) pale yellow.  · Keep all follow-up visits as told by your doctor. This is important.  Get help right away if you:  · Have a seizure.  · Have pain in your:  ? Chest.  ? Belly (abdomen).  ? Back.  · Faint once or more than once.  · Have a very bad headache.  · Are bleeding from your mouth or butt.  · Have black or tarry poop (stool).  · Have a very fast or uneven heartbeat (palpitations).  · Are mixed up (confused).  · Have trouble  walking.  · Are very weak.  · Have trouble seeing.  These symptoms may be an emergency. Do not wait to see if the symptoms will go away. Get medical help right away. Call your local emergency services (911 in the [place] Do not drive yourself to the hospital.  Summary  · Near-syncope is when you suddenly get weak or dizzy, or you feel like you might pass out (faint).  · This condition is caused by a lack of blood flow to the brain.  · Near-syncope may be a sign of a serious medical problem, so it is important to seek medical care.  This information is not intended to replace advice given to you by your health care provider. Make sure you discuss any questions you have with your health care provider.  Document Revised: 04/10/2020 Document Reviewed: 11/06/2019  Elsevier Patient Education © 2020 [employer]  Diabetes Basics    Diabetes (diabetes mellitus) is a long-term (chronic) disease. It occurs when the body does not properly use sugar (glucose) that is released from food after you eat.  Diabetes may be caused by one or both of these problems:  · Your pancreas does not make enough of a hormone called insulin.  · Your body does not react in a normal way to insulin that it makes.  Insulin lets sugars (glucose) go into cells in your body. This gives you energy. If you have diabetes, sugars cannot get into cells. This causes high blood sugar (hyperglycemia).  Follow these instructions at home:  How is diabetes treated?  You may need to take insulin or other diabetes medicines daily to keep your blood sugar in balance. Take your diabetes medicines every day as told by your doctor. List your diabetes medicines here:  Diabetes medicines  · Name of medicine: ______________________________  ? Amount (dose): _______________ Time (a.m./p.m.): _______________ Notes: ___________________________________  · Name of medicine: ______________________________  ? Amount (dose): _______________ Time (a.m./p.m.): _______________ Notes:  ___________________________________  · Name of medicine: ______________________________  ? Amount (dose): _______________ Time (a.m./p.m.): _______________ Notes: ___________________________________  If you use insulin, you will learn how to give yourself insulin by injection. You may need to adjust the amount based on the food that you eat. List the types of insulin you use here:  Insulin  · Insulin type: ______________________________  ? Amount (dose): _______________ Time (a.m./p.m.): _______________ Notes: ___________________________________  · Insulin type: ______________________________  ? Amount (dose): _______________ Time (a.m./p.m.): _______________ Notes: ___________________________________  · Insulin type: ______________________________  ? Amount (dose): _______________ Time (a.m./p.m.): _______________ Notes: ___________________________________  · Insulin type: ______________________________  ? Amount (dose): _______________ Time (a.m./p.m.): _______________ Notes: ___________________________________  · Insulin type: ______________________________  ? Amount (dose): _______________ Time (a.m./p.m.): _______________ Notes: ___________________________________  How do I manage my blood sugar?    Check your blood sugar levels using a blood glucose monitor as directed by your doctor.  Your doctor will set treatment goals for you. Generally, you should have these blood sugar levels:  · Before meals (preprandial):  mg/dL (4.4-7.2 mmol/L).  · After meals (postprandial): below 180 mg/dL (10 mmol/L).  · A1c level: less than 7%.  Write down the times that you will check your blood sugar levels:  Blood sugar checks  · Time: _______________ Notes: ___________________________________  · Time: _______________ Notes: ___________________________________  · Time: _______________ Notes: ___________________________________  · Time: _______________ Notes: ___________________________________  · Time: _______________  Notes: ___________________________________  · Time: _______________ Notes: ___________________________________    What do I need to know about low blood sugar?  Low blood sugar is called hypoglycemia. This is when blood sugar is at or below 70 mg/dL (3.9 mmol/L). Symptoms may include:  · Feeling:  ? Hungry.  ? Worried or nervous (anxious).  ? Sweaty and clammy.  ? Confused.  ? Dizzy.  ? Sleepy.  ? Sick to your stomach (nauseous).  · Having:  ? A fast heartbeat.  ? A headache.  ? A change in your vision.  ? Tingling or no feeling (numbness) around the mouth, lips, or tongue.  ? Jerky movements that you cannot control (seizure).  · Having trouble with:  ? Moving (coordination).  ? Sleeping.  ? Passing out (fainting).  ? Getting upset easily (irritability).  Treating low blood sugar  To treat low blood sugar, eat or drink something sugary right away. If you can think clearly and swallow safely, follow the 15:15 rule:  · Take 15 grams of a fast-acting carb (carbohydrate). Talk with your doctor about how much you should take.  · Some fast-acting carbs are:  ? Sugar tablets (glucose pills). Take 3-4 glucose pills.  ? 6-8 pieces of hard candy.  ? 4-6 oz (120-150 mL) of fruit juice.  ? 4-6 oz (120-150 mL) of regular (not diet) soda.  ? 1 Tbsp (15 mL) honey or sugar.  · Check your blood sugar 15 minutes after you take the carb.  · If your blood sugar is still at or below 70 mg/dL (3.9 mmol/L), take 15 grams of a carb again.  · If your blood sugar does not go above 70 mg/dL (3.9 mmol/L) after 3 tries, get help right away.  · After your blood sugar goes back to normal, eat a meal or a snack within 1 hour.  Treating very low blood sugar  If your blood sugar is at or below 54 mg/dL (3 mmol/L), you have very low blood sugar (severe hypoglycemia). This is an emergency. Do not wait to see if the symptoms will go away. Get medical help right away. Call your local emergency services (911 in the [place] Do not drive yourself to the  [facility].  Questions to ask your health care provider  · Do I need to meet with a diabetes educator?  · What equipment will I need to care for myself at home?  · What diabetes medicines do I need? When should I take them?  · How often do I need to check my blood sugar?  · What number can I call if I have questions?  · When is my next doctor's visit?  · Where can I find a support group for people with diabetes?  Where to find more information  · American Diabetes Association: www.diabetes.org  · American Association of Diabetes Educators: www.diabeteseducator.org/patient-resources  Contact a doctor if:  · Your blood sugar is at or above 240 mg/dL (13.3 mmol/L) for 2 days in a row.  · You have been sick or have had a fever for 2 days or more, and you are not getting better.  · You have any of these problems for more than 6 hours:  ? You cannot eat or drink.  ? You feel sick to your stomach (nauseous).  ? You throw up (vomit).  ? You have watery poop (diarrhea).  Get help right away if:  · Your blood sugar is lower than 54 mg/dL (3 mmol/L).  · You get confused.  · You have trouble:  ? Thinking clearly.  ? Breathing.  Summary  · Diabetes (diabetes mellitus) is a long-term (chronic) disease. It occurs when the body does not properly use sugar (glucose) that is released from food after digestion.  · Take insulin and diabetes medicines as told.  · Check your blood sugar every day, as often as told.  · Keep all follow-up visits as told by your doctor. This is important.  This information is not intended to replace advice given to you by your health care provider. Make sure you discuss any questions you have with your health care provider.  Document Revised: 09/09/2020 Document Reviewed: 03/22/2019  Elsevier Patient Education © 2020 [employer]  Diabetes Mellitus and Nutrition, Adult  When you have diabetes (diabetes mellitus), it is very important to have healthy eating habits because your blood sugar (glucose) levels are  greatly affected by what you eat and drink. Eating healthy foods in the appropriate amounts, at about the same times every day, can help you:  · Control your blood glucose.  · Lower your risk of heart disease.  · Improve your blood pressure.  · Reach or maintain a healthy weight.  Every person with diabetes is different, and each person has different needs for a meal plan. Your health care provider may recommend that you work with a diet and nutrition specialist (dietitian) to make a meal plan that is best for you. Your meal plan may vary depending on factors such as:  · The calories you need.  · The medicines you take.  · Your weight.  · Your blood glucose, blood pressure, and cholesterol levels.  · Your activity level.  · Other health conditions you have, such as heart or kidney disease.  How do carbohydrates affect me?  Carbohydrates, also called carbs, affect your blood glucose level more than any other type of food. Eating carbs naturally raises the amount of glucose in your blood. Carb counting is a method for keeping track of how many carbs you eat. Counting carbs is important to keep your blood glucose at a healthy level, especially if you use insulin or take certain oral diabetes medicines.  It is important to know how many carbs you can safely have in each meal. This is different for every person. Your dietitian can help you calculate how many carbs you should have at each meal and for each snack.  Foods that contain carbs include:  · Bread, cereal, rice, pasta, and crackers.  · Potatoes and corn.  · Peas, beans, and lentils.  · Milk and yogurt.  · Fruit and juice.  · Desserts, such as cakes, cookies, ice cream, and candy.  How does alcohol affect me?  Alcohol can cause a sudden decrease in blood glucose (hypoglycemia), especially if you use insulin or take certain oral diabetes medicines. Hypoglycemia can be a life-threatening condition. Symptoms of hypoglycemia (sleepiness, dizziness, and confusion) are  "similar to symptoms of having too much alcohol.  If your health care provider says that alcohol is safe for you, follow these guidelines:  · Limit alcohol intake to no more than 1 drink per day for nonpregnant women and 2 drinks per day for men. One drink equals 12 oz of beer, 5 oz of wine, or 1½ oz of hard liquor.  · Do not drink on an empty stomach.  · Keep yourself hydrated with water, diet soda, or unsweetened iced tea.  · Keep in mind that regular soda, juice, and other mixers may contain a lot of sugar and must be counted as carbs.  What are tips for following this plan?    Reading food labels  · Start by checking the serving size on the \"Nutrition Facts\" label of packaged foods and drinks. The amount of calories, carbs, fats, and other nutrients listed on the label is based on one serving of the item. Many items contain more than one serving per package.  · Check the total grams (g) of carbs in one serving. You can calculate the number of servings of carbs in one serving by dividing the total carbs by 15. For example, if a food has 30 g of total carbs, it would be equal to 2 servings of carbs.  · Check the number of grams (g) of saturated and trans fats in one serving. Choose foods that have low or no amount of these fats.  · Check the number of milligrams (mg) of salt (sodium) in one serving. Most people should limit total sodium intake to less than 2,300 mg per day.  · Always check the nutrition information of foods labeled as \"low-fat\" or \"nonfat\". These foods may be higher in added sugar or refined carbs and should be avoided.  · Talk to your dietitian to identify your daily goals for nutrients listed on the label.  Shopping  · Avoid buying canned, premade, or processed foods. These foods tend to be high in fat, sodium, and added sugar.  · Shop around the outside edge of the grocery store. This includes fresh fruits and vegetables, bulk grains, fresh meats, and fresh dairy.  Cooking  · Use low-heat " cooking methods, such as baking, instead of high-heat cooking methods like deep frying.  · Cook using healthy oils, such as olive, canola, or sunflower oil.  · Avoid cooking with butter, cream, or high-fat meats.  Meal planning  · Eat meals and snacks regularly, preferably at the same times every day. Avoid going long periods of time without eating.  · Eat foods high in fiber, such as fresh fruits, vegetables, beans, and whole grains. Talk to your dietitian about how many servings of carbs you can eat at each meal.  · Eat 4-6 ounces (oz) of lean protein each day, such as lean meat, chicken, fish, eggs, or tofu. One oz of lean protein is equal to:  ? 1 oz of meat, chicken, or fish.  ? 1 egg.  ? ¼ cup of tofu.  · Eat some foods each day that contain healthy fats, such as avocado, nuts, seeds, and fish.  Lifestyle  · Check your blood glucose regularly.  · Exercise regularly as told by your health care provider. This may include:  ? 150 minutes of moderate-intensity or vigorous-intensity exercise each week. This could be brisk walking, biking, or water aerobics.  ? Stretching and doing strength exercises, such as yoga or weightlifting, at least 2 times a week.  · Take medicines as told by your health care provider.  · Do not use any products that contain nicotine or tobacco, such as cigarettes and e-cigarettes. If you need help quitting, ask your health care provider.  · Work with a counselor or diabetes educator to identify strategies to manage stress and any emotional and social challenges.  Questions to ask a health care provider  · Do I need to meet with a diabetes educator?  · Do I need to meet with a dietitian?  · What number can I call if I have questions?  · When are the best times to check my blood glucose?  Where to find more information:  · American Diabetes Association: diabetes.org  · Academy of Nutrition and Dietetics: www.eatright.org  · National [place] of Diabetes and Digestive and Kidney Diseases  ([facility]): www.niddk.nih.gov  Summary  · A healthy meal plan will help you control your blood glucose and maintain a healthy lifestyle.  · Working with a diet and nutrition specialist (dietitian) can help you make a meal plan that is best for you.  · Keep in mind that carbohydrates (carbs) and alcohol have immediate effects on your blood glucose levels. It is important to count carbs and to use alcohol carefully.  This information is not intended to replace advice given to you by your health care provider. Make sure you discuss any questions you have with your health care provider.  Document Revised: 11/30/2018 Document Reviewed: 01/22/2018  Elsevier Patient Education © 2020 [employer]

## 2021-01-23 NOTE — DISCHARGE SUMMARY
[facility] Medicine Services  DISCHARGE SUMMARY    Patient Name: Shlomo Mcallister  : 1950  MRN: [id number]    Date of Admission: 2021  2:33 PM  Date of Discharge:  2021  Primary Care Physician: Provider, No Known    Consults     Date and Time Order Name Status Description    2021 0032 Inpatient Cardiology Consult Completed           Hospital Course     Presenting Problem:   Syncope [R55]  Syncope [R55]    Active Hospital Problems    Diagnosis  POA   • **Syncope [R55]  Yes   • Bilateral conjunctivitis [H10.9]  Yes   • NSTEMI (non-ST elevated myocardial infarction) (CMS/Formerly [facility]) [I21.4]  Yes   • Hx of blood clots [Z86.718]  Not Applicable   • CHF (congestive heart failure) (CMS/Formerly [facility]) [I50.9]  Unknown   • Anemia, chronic disease [D63.8]  Yes   • CAD (coronary artery disease) [I25.10]  Yes   • DM2 (diabetes mellitus, type 2) (CMS/Formerly [facility]) [E11.9]  Yes   • Essential hypertension [I10]  Yes   • GERD without esophagitis [K21.9]  Yes   • HLD (hyperlipidemia) [E78.5]  Yes   • Prostate cancer metastatic to bone (CMS/[facility]ly [facility]) [C61, C79.51]  Yes   • Open wound of foot [S91.309A]  Unknown   • Elevated TSH [R79.89]  Yes      Resolved Hospital Problems   No resolved problems to display.          Hospital Course:  Shlomo Mcallister is a 70 y.o. male  with CAD s/p CABG, CHF unknown EF, uncontrolled DM2, prior DVT/PE on Xarelto, metastatic prostate cancer usually treated in [place] but being referred to .  He was admitted here 2021 for DKA and discharged 2021, he has recurring hypoglycemic episodes chronically due to brittle diabetes.  Brought to [facility] ED for anginal chest pain and syncopal spell, found to be hypoglycemic on evaluation as well as have initial troponin elevation which subsequently trended down. Cardiology consultation, increased Coreg but is deferring any ischemic eval at this time for his chronic angina, additionally added Ranexa and stopping Imdur. Consider PET scan if  angina continues despite maximal medical therapy. Patient is medically stable and ready for discharge, wants to follow up with his primary cardiologist in [place].     Discharge Follow Up Recommendations for outpatient labs/diagnostics:   PCP 1 week with close monitoring of glucose   Primary Cardiologist 2 weeks     Day of Discharge     HPI:   Feels well today, up in bed eating breakfast. NAD. No pain. Very eager to go home. No further episodes of CP. Denies f/c,n/v/d, soa or cp.     Review of Systems  All other systems negative     Vital Signs:   Temp:  [98.5 °F (36.9 °C)-100 °F (37.8 °C)] 99 °F (37.2 °C)  Heart Rate:  [72-86] 83  Resp:  [16-17] 16  BP: ()/(56-65) 134/61     Physical Exam:  Constitutional: Awake, alert  Eyes: PERRLA, sclerae anicteric, no conjunctival injection  HENT: NCAT, mucous membranes moist  Neck: Supple, no thyromegaly, no lymphadenopathy, trachea midline  Respiratory: Clear to auscultation bilaterally, nonlabored respirations   Cardiovascular: RRR, no murmurs, rubs, or gallops, palpable pedal pulses bilaterally  Gastrointestinal: Positive bowel sounds, soft, nontender, nondistended  Musculoskeletal: No bilateral ankle edema, no clubbing or cyanosis to extremities  Psychiatric: Appropriate affect, cooperative  Neurologic: Oriented x 3, MEDEROS, speech clear  Skin: No rashes     Pertinent  and/or Most Recent Results     LAB RESULTS:      Lab 01/21/21  0417 01/20/21  0437 01/19/21  2142 01/19/21  1635 01/19/21  1451 01/16/21  2251   WBC 5.09 5.41  --   --  6.26 4.43   HEMOGLOBIN 9.0* 9.9*  --   --  10.6* 9.8*   HEMATOCRIT 30.4* 33.2*  --   --  34.6* 33.8*   PLATELETS 192 205  --   --  252 231   NEUTROS ABS 2.86  --   --   --  3.54 2.86   IMMATURE GRANS (ABS) 0.21*  --   --   --  0.16* 0.06*   LYMPHS ABS 1.26  --   --   --  1.52 1.02   MONOS ABS 0.66  --   --   --  0.87 0.43   EOS ABS 0.07  --   --   --  0.12 0.03   MCV 96.2 99.7*  --   --  96.9 101.5*   LACTATE  --  1.4 2.5*  --   --   --       PROTIME  --   --   --  17.1*  --   --          Lab 01/21/21  0417 01/20/21  2224 01/20/21  0437 01/19/21  1451 01/18/21  0718 01/17/21  0422 01/16/21  2251   SODIUM 133* 132* 141 141 135* 139 131*   POTASSIUM 4.1 4.4 4.2 3.2* 4.0 3.7 3.8   CHLORIDE 98 97* 105 103 102 104 94*   CO2 23.0 24.0 26.0 25.0 22.0 25.0 18.0*   ANION GAP 12.0 11.0 10.0 13.0 11.0 10.0 19.0*   BUN 14 15 13 12 10 12 15   CREATININE 0.88 0.99 1.08 1.18 0.83 0.68* 0.80   GLUCOSE 380* 498* 239* 58* 411* 144* 386*   CALCIUM 8.6 8.6 8.7 9.1 8.2* 8.5* 8.7   MAGNESIUM 1.9  --   --  2.0 2.2 1.9 2.1   PHOSPHORUS  --   --   --   --  2.0* 1.8* 2.6   TSH  --   --   --  6.250*  --   --   --          Lab 01/19/21  1451 01/16/21  2251   TOTAL PROTEIN 7.6 7.6   ALBUMIN 3.50 2.80*   GLOBULIN 4.1 4.8   ALT (SGPT) 7 8   AST (SGOT) 34 22   BILIRUBIN 0.3 0.3   ALK PHOS 696* 536*   LIPASE 19  --          Lab 01/19/21  2142 01/19/21  1635 01/19/21  1451   PROBNP  --   --  6,864.0*   TROPONIN T 0.010  --  0.047*   PROTIME  --  17.1*  --    INR  --  1.43*  --          Lab 01/22/21  1320   CHOLESTEROL 88   LDL CHOL 25   HDL CHOL 32*   TRIGLYCERIDES 192*             Lab 01/16/21  1705   PH, ARTERIAL 7.391   PCO2, ARTERIAL 34.9*   PO2 ART 67.5*   FIO2 21   HCO3 ART 21.2   BASE EXCESS ART -3.3*   CARBOXYHEMOGLOBIN 1.4     Brief Urine Lab Results  (Last result in the past 365 days)      Color   Clarity   Blood   Leuk Est   Nitrite   Protein   CREAT   Urine HCG        01/20/21 0000 Yellow Clear Negative Negative Negative Negative             Microbiology Results (last 10 days)     Procedure Component Value - Date/Time    COVID PRE-OP / PRE-PROCEDURE SCREENING ORDER (NO ISOLATION) - Swab, Nasopharynx [[id number]]  (Normal) Collected: 01/19/21 2222    Lab Status: Final result Specimen: Swab from Nasopharynx Updated: 01/20/21 0049    Narrative:      The following orders were created for panel order COVID PRE-OP / PRE-PROCEDURE SCREENING ORDER (NO ISOLATION) - Swab,  Nasopharynx.  Procedure                               Abnormality         Status                     ---------                               -----------         ------                     COVID-19 and FLU A/B PCR...[[id number]]  Normal              Final result                 Please view results for these tests on the individual orders.    COVID-19 and FLU A/B PCR - Swab, Nasopharynx [[id number]]  (Normal) Collected: 01/19/21 2222    Lab Status: Final result Specimen: Swab from Nasopharynx Updated: 01/20/21 0049     COVID19 Not Detected     Influenza A PCR Not Detected     Influenza B PCR Not Detected    Narrative:      Fact sheet for providers: https://www.fda.gov/media/063509/download    Fact sheet for patients: https://www.fda.gov/media/443693/download    Test performed by PCR.    MRSA Screen, PCR (Inpatient) - Swab, Nares [[id number]]  (Abnormal) Collected: 01/19/21 2222    Lab Status: Final result Specimen: Swab from Nares Updated: 01/20/21 0918     MRSA PCR Positive    Blood Culture - Blood, Arm, Left [[id number]] Collected: 01/19/21 2147    Lab Status: Preliminary result Specimen: Blood from Arm, Left Updated: 01/22/21 2245     Blood Culture No growth at 3 days    Blood Culture - Blood, Arm, Left [[id number]] Collected: 01/19/21 2142    Lab Status: Preliminary result Specimen: Blood from Arm, Left Updated: 01/22/21 2245     Blood Culture No growth at 3 days    COVID PRE-OP / PRE-PROCEDURE SCREENING ORDER (NO ISOLATION) - Swab, Nasopharynx [[id number]]  (Normal) Collected: 01/16/21 1958    Lab Status: Final result Specimen: Swab from Nasopharynx Updated: 01/16/21 2111    Narrative:      The following orders were created for panel order COVID PRE-OP / PRE-PROCEDURE SCREENING ORDER (NO ISOLATION) - Swab, Nasopharynx.  Procedure                               Abnormality         Status                     ---------                               -----------         ------                     COVID-19 and FLU A/B  PCR...[[id number]]  Normal              Final result                 Please view results for these tests on the individual orders.    COVID-19 and FLU A/B PCR - Swab, Nasopharynx [[id number]]  (Normal) Collected: 01/16/21 1958    Lab Status: Final result Specimen: Swab from Nasopharynx Updated: 01/16/21 2111     COVID19 Not Detected     Influenza A PCR Not Detected     Influenza B PCR Not Detected    Narrative:      Fact sheet for providers: https://www.fda.gov/media/203204/download    Fact sheet for patients: https://www.fda.gov/media/399330/download    Test performed by PCR.          Ct Head Without Contrast    Result Date: 1/20/2021  EXAMINATION: CT HEAD WO CONTRAST-01/19/2021:  INDICATION: Syncope.  TECHNIQUE: CT head without intravenous contrast.  The radiation dose reduction device was turned on for each scan per the ALARA (As Low as Reasonably Achievable) protocol.  COMPARISON: NONE.  FINDINGS: The midline structures are symmetric without evidence of mass, mass effect or midline shift. Ventricles and sulci demonstrate prominence of the sulci in the mesial temporal regions along with mild-to-moderate low attenuation in the periventricular and deep white matter suggesting mild-to-moderate chronic small vessel ischemic disease without intra-axial hemorrhage or extra-axial fluid collection. Globes and orbits are unremarkable. The paranasal sinuses and mastoid air cells are grossly clear and well pneumatized. Calvarium intact.      No acute intracranial findings specifically, no midline shift or hydrocephalus and no intra-axial hemorrhage, with prominence of the sulci in the mesial temporal regions concerning for cerebral atrophy or potential early neurodegenerative processes given mild-to-moderate chronic small vessel ischemic disease additionally noted in the supratentorial white matter.  D:  01/19/2021 E:  01/20/2021  This report was finalized on 1/20/2021 6:00 PM by Dr. oMises Pineda.      Xr Chest 1  View    Result Date: 1/18/2021   EXAMINATION: XR CHEST, SINGLE VIEW - 01/16/2021  INDICATION: Weakness, difficulty walking.  COMPARISON: None.  FINDINGS: Portable chest reveals heart to be enlarged. Underlying chronic changes within the lung fields. Elevation of the right hemidiaphragm. Portacatheter on the right tip in the SVC. Atelectatic changes in the lung bases with no superimposed infectious process. Degenerative changes seen within the spine.         Lung fields are grossly clear with no superimposed infectious process. Portacatheter remains on the right with tip in the SVC. Lung volumes are low.  DICTATED:   01/16/2021 EDITED/ls :   01/16/2021  This report was finalized on 1/18/2021 2:25 PM by Dr. Kaylee Byrd MD.      Ct Angiogram Chest    Result Date: 1/20/2021  EXAMINATION: CT ANGIOGRAM CHEST- 01/19/2021  INDICATION: Chest pain, history of CA, syncope  TECHNIQUE: CT angiogram chest with intravenous contrast administration following PE protocol. 2-D reconstructions performed.  The radiation dose reduction device was turned on for each scan per the ALARA (As Low as Reasonably Achievable) protocol.  COMPARISON: CT angiogram chest dated 02/01/2014  FINDINGS: Thyroid is homogeneous. No bulky mediastinal adenopathy with cardiac silhouette remaining enlarged status post median sternotomy and CABG with coronary calcifications. Patent nonaneurysmal thoracic aorta with patent great vessel origins. Satisfactory opacification of the pulmonary arterial tree without filling defect to suggest pulmonary embolus. Extended lung windows reveal opacifications at the left lung base of airspace disease versus atelectasis adjacent to a trace volume left pleural effusion. Degenerative changes of the spine without aggressive osseous lesion. No soft tissue body wall findings of acuity. Visualized portions of the upper abdomen reveal cholelithiasis.      1. No PE. 2. Opacifications at the left lung base of atelectasis versus  airspace disease adjacent to a trace volume left pleural effusion. 3. Cholelithiasis.  D:  01/19/2021 E:  01/20/2021  This report was finalized on 1/20/2021 6:00 PM by Dr. Moises Pineda.                  Results for orders placed during the hospital encounter of 01/19/21   Adult Transthoracic Echo Complete W/ Cont if Necessary Per Protocol    Narrative · Estimated left ventricular EF = 55% Left ventricular systolic function   is normal.  · Normal valvular structure and function          Plan for Follow-up of Pending Labs/Results: PCP   Pending Labs     Order Current Status    Blood Culture - Blood, Arm, Left Preliminary result    Blood Culture - Blood, Arm, Left Preliminary result        Discharge Details        Discharge Medications      New Medications      Instructions Start Date   aspirin 81 MG EC tablet   81 mg, Oral, Daily      ranolazine 500 MG 12 hr tablet  Commonly known as: RANEXA   500 mg, Oral, Every 12 Hours Scheduled         Changes to Medications      Instructions Start Date   carvedilol 12.5 MG tablet  Commonly known as: COREG  What changed:   · medication strength  · how much to take   12.5 mg, Oral, 2 Times Daily With Meals      insulin regular 500 UNIT/ML CONCENTRATED injection  Commonly known as: humuLIN R  What changed:   · how much to take  · how to take this  · when to take this  · additional instructions   25 Units, Subcutaneous, 2 Times Daily         Continue These Medications      Instructions Start Date   calcium citrate-vitamin d 200-250 MG-UNIT tablet tablet  Commonly known as: CITRACAL   Oral, Daily      DULoxetine 60 MG capsule  Commonly known as: CYMBALTA   60 mg, Oral, Daily      fish oil 1000 MG capsule capsule   Oral, Daily With Breakfast      furosemide 20 MG tablet  Commonly known as: LASIX   20 mg, Oral, Daily      gabapentin 400 MG capsule  Commonly known as: NEURONTIN   400 mg, Oral, 3 Times Daily      metFORMIN 500 MG tablet  Commonly known as: GLUCOPHAGE   500 mg, Oral, Daily  With Dinner      multivitamin with minerals tablet tablet   1 tablet, Oral, Daily      oxyCODONE 15 MG immediate release tablet  Commonly known as: ROXICODONE   15 mg, Oral, Every 4 Hours PRN      pantoprazole 40 MG EC tablet  Commonly known as: PROTONIX   40 mg, Oral, Daily      pravastatin 40 MG tablet  Commonly known as: PRAVACHOL   40 mg, Oral, Daily      rivaroxaban 20 MG tablet  Commonly known as: XARELTO   20 mg, Oral, Daily         Stop These Medications    isosorbide mononitrate 60 MG 24 hr tablet  Commonly known as: IMDUR     Xtampza ER 13.5 MG capsule extended-release 12 hour   Generic drug: oxyCODONE ER            Allergies   Allergen Reactions   • Uroxatral [Alfuzosin Hcl Er] GI Intolerance         Discharge Disposition:  Home or Self Care    Diet:  Hospital:  No active diet order      Activity:  Activity Instructions     Activity as Tolerated                 CODE STATUS:    Code Status and Medical Interventions:   Ordered at: 01/19/21 2106     Level Of Support Discussed With:    Patient     Code Status:    CPR     Medical Interventions (Level of Support Prior to Arrest):    Full       No future appointments.    Additional Instructions for the Follow-ups that You Need to Schedule     Discharge Follow-up with PCP   As directed       Currently Documented PCP:    Provider, No Known    PCP Phone Number:    None     Follow Up Details: 1 week         Discharge Follow-up with Specified Provider: Primary Cardiology; 2 Weeks   As directed      To: Primary Cardiology    Follow Up: 2 Weeks                     Seda West, LORETO  01/23/21      Time Spent on Discharge:  I spent  45  minutes on this discharge activity which included: face-to-face encounter with the patient, reviewing the data in the system, coordination of the care with the nursing staff as well as consultants, documentation, and entering orders.

## 2021-01-23 NOTE — PROGRESS NOTES
"[facility] Cardiology Daily Note       LOS: 0 days   Patient Care Team:  Provider, No Known as PCP - General  Provider, No Known as PCP - Family Medicine    Chief Complaint:  Chest pain; syncope; CAD    Subjective     Subjective: no acute events overnight. Denies recurrent chest pain, near syncope, syncope, LH, dizziness. No complaints.     Review of Systems:   As above.    Medications:  aspirin, 81 mg, Oral, Daily  carvedilol, 12.5 mg, Oral, BID With Meals  carvedilol, 6.25 mg, Oral, Once  DULoxetine, 60 mg, Oral, Daily  furosemide, 20 mg, Oral, Daily  gabapentin, 400 mg, Oral, TID  insulin lispro, 0-14 Units, Subcutaneous, 4x Daily With Meals & Nightly  insulin regular, 20 Units, Subcutaneous, BID  pantoprazole, 40 mg, Oral, Daily  pravastatin, 40 mg, Oral, Daily  ranolazine, 500 mg, Oral, Q12H  rivaroxaban, 20 mg, Oral, Daily With Breakfast  sodium chloride, 10 mL, Intravenous, Q12H  trimethoprim-polymyxin b, 1 drop, Both Eyes, Q4H        Objective     Vital Sign Min/Max for last 24 hours  Temp  Min: 98.1 °F (36.7 °C)  Max: 100 °F (37.8 °C)   BP  Min: 81/42  Max: 125/63   Pulse  Min: 69  Max: 86   Resp  Min: 16  Max: 17   SpO2  Min: 91 %  Max: 95 %   No data recorded   Weight  Min: 97.7 kg (215 lb 6.4 oz)  Max: 97.7 kg (215 lb 6.4 oz)      Intake/Output Summary (Last 24 hours) at 1/23/2021 0853  Last data filed at 1/23/2021 0755  Gross per 24 hour   Intake 714 ml   Output 1100 ml   Net -386 ml        Flowsheet Rows      First Filed Value   Admission Height  170.2 cm (67\") Documented at 01/19/2021 1437   Admission Weight  99.8 kg (220 lb) Documented at 01/19/2021 1437          Physical Exam:    General: Alert and oriented   Cardiovascular: Heart has a nondisplaced focal PMI. Regular rate and rhythm without murmur, gallop or rub.  Lungs: Clear without rales or wheezes. Equal expansion is noted.   Extremities: Show no edema.  Skin: warm and dry.  Neurologic: nonfocal       Results Review:    I " reviewed the patient's new clinical results.  EKG:  Tele: NSR    Labs:    Results from last 7 days   Lab Units 01/21/21  0417 01/20/21  2224 01/20/21  0437 01/19/21  1451  01/16/21  2251 01/16/21  1502   SODIUM mmol/L 133* 132* 141 141   < > 131* 134*   POTASSIUM mmol/L 4.1 4.4 4.2 3.2*   < > 3.8 4.4   CHLORIDE mmol/L 98 97* 105 103   < > 94* 93*   CO2 mmol/L 23.0 24.0 26.0 25.0   < > 18.0* 21.0*   BUN mg/dL 14 15 13 12   < > 15 14   CREATININE mg/dL 0.88 0.99 1.08 1.18   < > 0.80 0.86   CALCIUM mg/dL 8.6 8.6 8.7 9.1   < > 8.7 9.2   BILIRUBIN mg/dL  --   --   --  0.3  --  0.3 0.3   ALK PHOS U/L  --   --   --  696*  --  536* 574*   ALT (SGPT) U/L  --   --   --  7  --  8 6   AST (SGOT) U/L  --   --   --  34  --  22 27   GLUCOSE mg/dL 380* 498* 239* 58*   < > 386* 414*    < > = values in this interval not displayed.     Results from last 7 days   Lab Units 01/21/21 0417 01/20/21  0437 01/19/21  1451   WBC 10*3/mm3 5.09 5.41 6.26   HEMOGLOBIN g/dL 9.0* 9.9* 10.6*   HEMATOCRIT % 30.4* 33.2* 34.6*   PLATELETS 10*3/mm3 192 205 252       Lipid Panel    Lipid Panel 1/22/21   Triglycerides 192 (A)   HDL Cholesterol 32 (A)   VLDL Cholesterol 31   LDL Cholesterol  25   LDL/HDL Ratio 0.55   (A) Abnormal value              Echo:    · Estimated left ventricular EF = 55% Left ventricular systolic function is normal.  · Normal valvular structure and function    Assessment   Assessment:    1. Syncope  1. Probably orthostatic.  no source of syncope noted on echo; no rhythm issues to explain syncopal event.  2. Orthostatic hypotension with a fall from 181-155 systolic on standing  1. Discontinued imdur  3. CAD, status post CABG and coronary stents  4. Mildly elevated troponin  chest pain  5. Hypertension, controlled  6. Hyperlipidemia, controlled.   7. History of bilateral pulmonary emboli  8. Type 2 diabetes  9. Anemia    Plan:    1. Continue aspirin 81 mg for coronary disease  2. Resume Xarelto for history of pulmonary  embolus  3. Optimize medical therapy for coronary disease.  If the patient continues to have angina despite optimal medical therapy a PET scan could be considered to localize the potential area of ischemia.  4. Continue carvedilol for coronary disease.  5. Consider ACEI however the patient is orthostatic with a drop in pressure on January 20 from 181-155 with standing.  6. Continue Ranexa 500 mg every 12  7. Continue statin therapy.        Okay for d/c home today from cardiac standpoint. Follow-up with cardiologist in [place]      Electronically signed by LORETO Mendoza, 01/23/21, 8:53 AM EST.

## 2021-01-23 NOTE — PLAN OF CARE
Patient VSS on room air. Patient glucose at 20:30 = 476. Humalog given. Glucose check at 23:50 = 422. Patient to receive  No c/o chest this pain shift. Patient to receive scheduled Humilin 500u/20 units at 06:00 on this shift. NSR on telemetry.

## 2021-01-24 NOTE — OUTREACH NOTE
Prep Survey      Responses   [facility] facility patient discharged from?  [place]   Is LACE score < 7 ?  No   Emergency Room discharge w/ pulse ox?  No   Eligibility  Readm Mgmt   Discharge diagnosis  syncope and hypoglycemia   Does the patient have one of the following disease processes/diagnoses(primary or secondary)?  Other   Does the patient have Home health ordered?  No   Is there a DME ordered?  No   Comments regarding appointments  call for apmts   Medication alerts for this patient  aspirin, ranexa, coreg, humulin R   Prep survey completed?  Yes          Rachael Angeles RN

## 2021-01-25 NOTE — OUTREACH NOTE
Medical Week 1 Survey      Responses   [facility] patient discharged from?  [place]   Does the patient have one of the following disease processes/diagnoses(primary or secondary)?  Other   Week 1 attempt successful?  No   Unsuccessful attempts  Attempt 2          Nikki Caro RN

## 2021-01-26 NOTE — OUTREACH NOTE
Medical Week 1 Survey      Responses   [facility] patient discharged from?  [place]   Does the patient have one of the following disease processes/diagnoses(primary or secondary)?  Other   Week 1 attempt successful?  No   Unsuccessful attempts  Attempt 3          Nikki Caro RN

## 2021-02-01 NOTE — OUTREACH NOTE
Medical Week 2 Survey      Responses   [facility] patient discharged from?  [place]   Does the patient have one of the following disease processes/diagnoses(primary or secondary)?  Other   Week 2 attempt successful?  No   Unsuccessful attempts  Attempt 1          Shirin Hess RN

## 2021-03-02 PROBLEM — K11.21 PAROTITIS, ACUTE: Status: ACTIVE | Noted: 2021-01-01

## 2021-03-02 PROBLEM — A41.9 SEPSIS SECONDARY TO UTI (HCC): Status: ACTIVE | Noted: 2021-01-01

## 2021-03-02 PROBLEM — N39.0 SEPSIS SECONDARY TO UTI (HCC): Status: ACTIVE | Noted: 2021-01-01

## 2021-03-02 NOTE — ED PROVIDER NOTES
" EMERGENCY DEPARTMENT ENCOUNTER    Pt Name: Shlomo Mcallister  MRN: [id number]  Pt :   1950  Room Number:    Date of encounter:  3/2/2021  PCP: Provider, No Known  ED Provider: Vazquez Stovall MD     Historian: EMS and patient's wife      HPI:  Chief Complaint: Altered mental status        Context: Shlomo Mcallister is a 70 y.o. male who presents to the ED as an EMS transport from his residence.  The patient has been suffering from gradually increasing altered mental status.  He has been less and less able to consume fluids and foods over the last few days.  Today the patient was completely unresponsive, unable to answer any questions or speak whatsoever.  Wife called 911.  The patient has a history of prostate cancer diagnosed in .  It has spread to his hips, right leg, brain as far she is aware.  She is not aware of any fevers, chills, nausea, vomiting, diarrhea.  His last bowel movement was several days ago.  No known Covid exposure other than a transport person who may have had the disease that he was not in face-to-face exposure to.  The patient was on Extensa for roughly 2 weeks but this was discontinued a few days ago as the wife felt that it may be causing sleepiness.  At this point the patient is a full code according to the patient's wife.  She does not wish heroic measures to occur if \"there is no chance of recovery\".      PAST MEDICAL HISTORY  Active Ambulatory Problems     Diagnosis Date Noted   • DKA (diabetic ketoacidoses) (CMS/[facility]) 2021   • Anemia, chronic disease 2021   • DM2 (diabetes mellitus, type 2) (CMS/[facility]) 2021   • Prostate cancer metastatic to bone (CMS/[facility]) 2021   • Essential hypertension 2021   • HLD (hyperlipidemia) 2021   • GERD without esophagitis 2021   • CAD (coronary artery disease) 2021   • CHF (congestive heart failure) (CMS/[facility]) 2021   • Syncope 2021   • Bilateral conjunctivitis 2021   • NSTEMI (non-ST " elevated myocardial infarction) (CMS/HCC) 01/19/2021   • Hx of blood clots 01/19/2021   • Open wound of foot 01/16/2021   • Elevated TSH 01/16/2021     Resolved Ambulatory Problems     Diagnosis Date Noted   • No Resolved Ambulatory Problems     Past Medical History:   Diagnosis Date   • Anemia 1/16/2021   • DVT (deep venous thrombosis) (CMS/[facility])          PAST SURGICAL HISTORY  Past Surgical History:   Procedure Laterality Date   • VT REANOMAL CORON ART PA ORIGIN BY GRAFT     • TURP / TRANSURETHRAL INCISION / DRAINAGE PROSTATE           FAMILY HISTORY  History reviewed. No pertinent family history.      SOCIAL HISTORY  Social History     Socioeconomic History   • Marital status:      Spouse name: Not on file   • Number of children: Not on file   • Years of education: Not on file   • Highest education level: Not on file   Tobacco Use   • Smoking status: Former Smoker   • Smokeless tobacco: Never Used   Substance and Sexual Activity   • Alcohol use: Not Currently   • Drug use: Never   • Sexual activity: Defer   Social History Narrative    Patient lives in [place] with Wife, she is his primary caregiver         ALLERGIES  Uroxatral [alfuzosin hcl er]        REVIEW OF SYSTEMS  Review of Systems     All systems reviewed and negative except for those discussed in HPI.       PHYSICAL EXAM    I have reviewed the triage vital signs and nursing notes.    ED Triage Vitals [03/02/21 1249]   Temp Heart Rate Resp BP SpO2   97.9 °F (36.6 °C) 103 (!) 38 125/77 96 %      Temp src Heart Rate Source Patient Position BP Location FiO2 (%)   Axillary -- -- -- --       Physical Exam  GENERAL:   Appears somnolent.  HENT: Nares patent.  Extremely dry mucous membranes.  EYES: No scleral icterus.  Eyes are matted closed and need to be pried open digitally.  Once this is done the patient looks about but does not track to command.  CV: Regular rhythm, regular rate.  No murmurs gallops rubs audible  RESPIRATORY: Normal effort.  No audible  wheezes, rales or rhonchi.  Patient does not inspire deeply but there is no adventitious lung sounds.  ABDOMEN: Soft, nontender.  Protuberant with adipose  MUSCULOSKELETAL: No deformities.   NEURO: Somnolent.  Does not follow any commands or answer any questions.  Painful stimulus causes movements equally bilaterally more so in the upper extremities and lower extremities.  This is purposeful.  SKIN: Warm, dry, no rash visualized.        LAB RESULTS  Recent Results (from the past 24 hour(s))   Urinalysis With Culture If Indicated - Urine, Catheter In/Out    Collection Time: 03/02/21  1:03 PM    Specimen: Urine, Catheter In/Out   Result Value Ref Range    Color, UA Dark Yellow (A) Yellow, Straw    Appearance, UA Turbid (A) Clear    pH, UA <=5.0 5.0 - 8.0    Specific Gravity, UA 1.016 1.001 - 1.030    Glucose, UA Negative Negative    Ketones, UA Negative Negative    Bilirubin, UA Negative Negative    Blood, UA Moderate (2+) (A) Negative    Protein, UA 30 mg/dL (1+) (A) Negative    Leuk Esterase, UA Moderate (2+) (A) Negative    Nitrite, UA Negative Negative    Urobilinogen, UA 2.0 E.U./dL (A) 0.2 - 1.0 E.U./dL       If labs were ordered, I independently reviewed the results.        RADIOLOGY  No Radiology Exams Resulted Within Past 24 Hours    I ordered and reviewed the above noted radiographic studies.    I viewed images of head CT which showed no bleed or definite acute ischemia per my independent interpretation.  See radiologist's dictation for official interpretation.        PROCEDURES    Procedures    ECG 12 Lead   Final Result   Test Reason : Weak/Dizzy/AMS protocol   Blood Pressure : **/** mmHG   Vent. Rate : 106 BPM     Atrial Rate : 106 BPM      P-R Int : 164 ms          QRS Dur : 088 ms       QT Int : 494 ms       P-R-T Axes : 000 -16 125 degrees      QTc Int : 656 ms      Sinus tachycardia with occasional premature ventricular complexes   Left ventricular hypertrophy with repolarization abnormality    Prolonged QT   Abnormal ECG   Confirmed by RUSTAM RAMIREZ MD ([id number]) on 3/2/2021 3:25:28 PM      Referred By:  JAMES           Confirmed By:RUSTAM RAMIREZ MD          MEDICATIONS GIVEN IN ER    Medications   sodium chloride 0.9 % flush 10 mL (has no administration in time range)   sodium chloride 0.9 % bolus 1,000 mL (has no administration in time range)   vancomycin 2000 mg/500 mL 0.9% NS IVPB ([facility]) (has no administration in time range)   piperacillin-tazobactam (ZOSYN) 3.375 g in iso-osmotic dextrose 50 ml (premix) (has no administration in time range)         PROGRESS, DATA ANALYSIS, CONSULTS, AND MEDICAL DECISION MAKING    All labs have been independently reviewed by me.  All radiology studies have been reviewed by me and the radiologist dictating the report.   EKG's have been independently viewed and interpreted by me.          ED Course as of Mar 02 1540   Tue Mar 02, 2021   1538 Broad-spectrum antibiotics are infusing IV.  I have paged the hospitalist for admission.    [MS]      ED Course User Index  [MS] Rustam Ramirez MD             AS OF 13:33 EST VITALS:    BP - 125/77  HR - 103  TEMP - 97.9 °F (36.6 °C) (Axillary)  O2 SATS - 96%        DIAGNOSIS  Final diagnoses:   Altered mental status, unspecified altered mental status type   Acute UTI   Parotitis   Prostate cancer metastatic to bone (CMS/HCC)         DISPOSITION  Admission           Rustam Ramirez MD  03/02/21 6133

## 2021-03-02 NOTE — H&P
[facility] Medicine Services  HISTORY AND PHYSICAL    Patient Name: Shlomo Mcallister  : 1950  MRN: [id number]  Primary Care Physician: Awilda, No Known  Date of admission: 3/2/2021    Subjective   Subjective     Chief Complaint:  AMS    HPI:  Shlomo Mcallister is a 70 y.o. 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  I spoke with the patient's wife who states that symptoms began approximately a week ago.  Patient was placed on Xtampza for better pain control related to his metastatic disease, but his wife stopped the medication because he was having delusions.  His wife states he has been reporting increased shortness of breath.  Patient has not required oxygen at home.  Presently, patient is requiring 45 L per nasal cannula.  Spouse states that he stopped walking approximately 1 week ago because of pain, fatigue, shortness of breath.  He has had nothing substantially for the last 3 to 4 days.  His last bowel movement was approximately a week ago.  Spoke with the wife extensively regarding CODE STATUS.  Patient spouse wishes for him to be a DNR. HE had a fall approximately week ago, wife stating no known injury, he did not hit his head.     COVID Details: [x] No Symptoms  Symptoms: [] Fever []  Cough [] Shortness of breath [] Change in taste or smell  Risks:   [] Direct Exposure [] High risk facility   Date of Symptoms:   __  Date of first positive COVID test: __    Review of Systems   Per pt's wife  Gen- No fevers, chills (+) malaise, fatigue, confusion  CV- No chest pain  Resp-  (+) dyspnea  GI- decreased appetite  MS- (+) chronic back pain, hip pain, bilateral leg pain  SKIN- (+) breakdown to left foot, scrap on back and legs  All other systems reviewed and are negative.     Personal History     Past Medical History:   Diagnosis Date   • Anemia 2021   • CAD (coronary artery  disease) 1/16/2021   • CHF (congestive heart failure) (CMS/HCC) 1/16/2021   • DM2 (diabetes mellitus, type 2) (CMS/[facility]) 1/16/2021   • DVT (deep venous thrombosis) (CMS/[facility])    • GERD without esophagitis 1/16/2021   • HLD (hyperlipidemia) 1/16/2021     Past Surgical History:   Procedure Laterality Date   • AR REANOMAL CORON ART PA ORIGIN BY GRAFT     • TURP / TRANSURETHRAL INCISION / DRAINAGE PROSTATE       Family History: family history is not on file. Otherwise pertinent FHx was reviewed and unremarkable.     Social History:  reports that he has quit smoking. He has never used smokeless tobacco. He reports previous alcohol use. He reports that he does not use drugs.  Social History     Social History Narrative    Patient lives in [place] with Wife, she is his primary caregiver     Medications:  DULoxetine, aspirin, calcium citrate-vitamin d, carvedilol, fish oil, furosemide, gabapentin, insulin regular, isosorbide mononitrate, metFORMIN, multivitamin with minerals, oxyCODONE, pantoprazole, pravastatin, ranolazine, and rivaroxaban    Allergies   Allergen Reactions   • Uroxatral [Alfuzosin Hcl Er] GI Intolerance     Objective   Objective     Vital Signs:   Temp:  [97.9 °F (36.6 °C)-99.5 °F (37.5 °C)] 99.5 °F (37.5 °C)  Heart Rate:  [] 102  Resp:  [28-38] 28  BP: (109-135)/(50-87) 130/64  Flow (L/min):  [2-5] 5    Physical Exam   Constitutional: somnolent, responds to pain when moving legs with moaning.   Eyes:  eyes glued shut with yellow exudate  HENT: NCAT, mucous membranes dry   Neck: Supple, no thyromegaly, no lymphadenopathy, trachea midline  Respiratory: diminished throughout.  Cardiovascular: RRR, no murmurs, rubs, or gallops, palpable pedal pulses bilaterally  Gastrointestinal: Positive bowel sounds, soft, nontender, nondistended, obese  Musculoskeletal: No bilateral ankle edema, no clubbing or cyanosis to extremities  Psychiatric: somnolent- unable to assess  Neurologic: somnolent, unable to follow  commands or answer questions. (+) moans to painful stimulus.   Skin: pressure wound to the left foot, scrape to back and right leg    Results Reviewed:  I have personally reviewed most recent indicated data and agree with findings including:  [x]  Laboratory  [x]  Radiology  [x]  EKG/Telemetry  []  Pathology  []  Cardiac/Vascular Studies  [x]  Old records  []  Other:  Most pertinent findings include:  D-Dimer 19.93, WBC 12.89, lactic acid 2.9, urine positive for UTI      LAB RESULTS:      Lab 03/02/21  1901 03/02/21  1302   WBC  --  12.89*   HEMOGLOBIN  --  9.1*   HEMATOCRIT  --  29.8*   PLATELETS  --  66*   NEUTROS ABS  --  10.31*   EOS ABS  --  0.00   MCV  --  96.8   LACTATE 2.5* 2.9*   D DIMER QUANT  --  19.93*         Lab 03/02/21  1302   SODIUM 142   POTASSIUM 3.9   CHLORIDE 105   CO2 27.0   ANION GAP 10.0   BUN 45*   CREATININE 1.57*   GLUCOSE 159*   CALCIUM 8.7   MAGNESIUM 2.2   TSH 2.230         Lab 03/02/21  1302   TOTAL PROTEIN 7.0   ALBUMIN 2.20*   GLOBULIN 4.8   ALT (SGPT) 13   AST (SGOT) 70*   BILIRUBIN 0.8   ALK PHOS 640*         Lab 03/02/21  2255 03/02/21  1302   PROBNP 4,031.0*  --    TROPONIN T 0.025 0.051*                 Lab 03/02/21  1512   PH, ARTERIAL 7.456*   PCO2, ARTERIAL 35.5   PO2 ART 82.2*   FIO2 40   HCO3 ART 25.0   BASE EXCESS ART 1.1   CARBOXYHEMOGLOBIN 1.1     Brief Urine Lab Results  (Last result in the past 365 days)      Color   Clarity   Blood   Leuk Est   Nitrite   Protein   CREAT   Urine HCG        03/02/21 1303 Dark Yellow Turbid Moderate (2+) Moderate (2+) Negative 30 mg/dL (1+)             Microbiology Results (last 10 days)     Procedure Component Value - Date/Time    COVID PRE-OP / PRE-PROCEDURE SCREENING ORDER (NO ISOLATION) - Swab, Nasopharynx [[id number]]  (Normal) Collected: 03/02/21 1436    Lab Status: Final result Specimen: Swab from Nasopharynx Updated: 03/02/21 9537    Narrative:      The following orders were created for panel order COVID PRE-OP / PRE-PROCEDURE  SCREENING ORDER (NO ISOLATION) - Swab, Nasopharynx.  Procedure                               Abnormality         Status                     ---------                               -----------         ------                     Respiratory Panel PCR w/...[[id number]]  Normal              Final result                 Please view results for these tests on the individual orders.    Respiratory Panel PCR w/COVID-19(SARS-CoV-2) AMITA/ADITHYA/SB/PAD/COR/MAD/HUMBLE In-House, NP Swab in UTM/VTM, 3-4 HR TAT - Swab, Nasopharynx [[id number]]  (Normal) Collected: 03/02/21 1436    Lab Status: Final result Specimen: Swab from Nasopharynx Updated: 03/02/21 1557     ADENOVIRUS, PCR Not Detected     Coronavirus 229E Not Detected     Coronavirus HKU1 Not Detected     Coronavirus NL63 Not Detected     Coronavirus OC43 Not Detected     COVID19 Not Detected     Human Metapneumovirus Not Detected     Human Rhinovirus/Enterovirus Not Detected     Influenza A PCR Not Detected     Influenza B PCR Not Detected     Parainfluenza Virus 1 Not Detected     Parainfluenza Virus 2 Not Detected     Parainfluenza Virus 3 Not Detected     Parainfluenza Virus 4 Not Detected     RSV, PCR Not Detected     Bordetella pertussis pcr Not Detected     Bordetella parapertussis PCR Not Detected     Chlamydophila pneumoniae PCR Not Detected     Mycoplasma pneumo by PCR Not Detected    Narrative:      Fact sheet for providers: https://docs.[employer]/wp-content/uploads/VUV9079-9166-NQ3.1-EUA-Provider-Fact-Sheet-3.pdf    Fact sheet for patients: https://docs.[employer]/wp-content/uploads/VHG2454-6264-GF3.1-EUA-Patient-Fact-Sheet-1.pdf    Test performed by PCR.          Ct Head Without Contrast    Result Date: 3/2/2021  EXAMINATION: CT HEAD WO CONTRAST-03/02/2021:  INDICATION: AMS.  TECHNIQUE: 5 mm unenhanced images through the brain.  The radiation dose reduction device was turned on for each scan per the ALARA (As Low as Reasonably Achievable) protocol.  COMPARISON:  01/19/2021.  FINDINGS: Previous exam report indicates no acute intracranial abnormality. The calvarium appears intact. There is opacification of most of the right mastoid air cells, and trace left mastoid disease. Included paranasal sinuses are mostly clear.  Soft tissue window images show relatively advanced generalized brain atrophy. There is resulting ventriculomegaly. There is no evidence of hemorrhage, contusion, edema, mass or mass effect, acute or old infarct, hydrocephalus, or abnormal extra-axial collection.      Impression: Moderately advanced generalized cerebral atrophy as on prior study. No evidence of acute intracranial disease.  D:  03/02/2021 E:  03/02/2021  This report was finalized on 3/2/2021 10:03 PM by Dr. Manny Buckley MD.      Ct Chest Without Contrast Diagnostic    Result Date: 3/2/2021  EXAMINATION: CT CHEST WO CONTRAST, DIAGNOSTIC-03/02/2021:  INDICATION: Cough, AMS.  TECHNIQUE: 5 mm unenhanced images through the chest and upper abdomen.  The radiation dose reduction device was turned on for each scan per the ALARA (As Low as Reasonably Achievable) protocol.  COMPARISON: Portable chest radiograph of same date. Angiographic chest CT scan 02/01/2014.  FINDINGS: History indicates cough and acute mental status change.  Mediastinal window images show no evidence of significant pericardial effusion, mediastinal adenopathy or mass. There are very small bilateral pleural effusions. Lung window images show mild-to-moderate bilateral lower lobe atelectasis, and scattered ill-defined pulmonary micronodules, mostly 1-3 mm in diameter, but probably more than a dozen in total, and greater in the right upper lobe than elsewhere. The bony structures appear heterogeneous/sclerotic, consistent with the patient's history of prostate metastatic disease.      Impression: 1. Diffuse sclerotic disease of the visualized bony structures consistent with the patient's known prostate metastatic disease. 2. Small pleural  effusions and moderate bilateral lower lobe atelectasis. 3. Faint new micronodular disease pattern of the lungs, questionable for metastatic disease. New granulomatous infection is considered in the differential.  D:  03/02/2021 E:  03/02/2021  This report was finalized on 3/2/2021 10:01 PM by Dr. Manny Buckley MD.      Ct Angiogram Chest With & Without Contrast    Result Date: 3/3/2021  CT CHEST WITH CONTRAST, PE PROTOCOL, 3/3/2021 HISTORY: 70-year-old male hospital inpatient with shortness of air. Altered mental status. History of metastatic prostate cancer. Assess for pulmonary embolism. TECHNIQUE: CT examination of the chest with IV contrast. CTA MIP multiplanar pulmonary artery images were reformatted with 3-D postprocessing. Radiation dose reduction techniques included automated exposure control. Radiation audit for CT and nuclear cardiology exams in the last 12 months: 4. COMPARISON: *  Noncontrast CT chest, 3/2/2021. FINDINGS: No pulmonary embolism is demonstrated. Thoracic aorta is normal in caliber with no aneurysm or dissection. Prior CABG surgery. No pericardial effusion. Small pleural effusions with consolidation and atelectasis in the dependent posterior lung bases. Nondependent lungs are mostly clear. Scattered small indeterminate pulmonary nodules within both lungs concerning for metastatic disease. This is unchanged. No visible thoracic or axillary adenopathy. Diffuse sclerotic skeletal metastasis, most readily visible throughout the imaged spine.     Impression: 1.  No evidence of pulmonary embolism or other acute vascular abnormality within the chest. 2.  Consolidation and atelectasis in the dependent posterior lung bases and tiny pleural effusions, unchanged since yesterday. 3.  Scattered tiny pulmonary nodules concerning for metastatic disease. Known diffuse sclerotic skeletal metastasis. Signer Name: Goldy Gibson MD  Signed: 3/3/2021 12:44 AM  Workstation [facility]: [facility]    Xr Chest 1 View    Result Date: 3/2/2021  CHEST X-RAY, 3/2/2021 (23:21) HISTORY:  70-year-old male hospital inpatient with hypoxia, altered mental status. Metastatic prostate cancer. History of congestive heart failure.  TECHNIQUE: AP portable chest x-ray. COMPARISON: *  Chest x-ray, 3/2/2021 (13:22). *  CT chest, 3/2/2021. FINDINGS: No change since earlier today. Tiny pleural effusions and bibasilar dependent posterior lung base atelectasis, best seen on the earlier CT study. Mid and upper lungs are clear. Prior CABG surgery. Heart size is normal. Central venous port catheter in good position. Subtle scattered pulmonary nodularity, also best seen on the prior CT.     Impression: Stable portable chest x-ray, unchanged since earlier today. Signer Name: Goldy Gibson MD  Signed: 3/2/2021 11:45 PM  Workstation Name: [facility]  Radiology [facility]    Xr Chest 1 View    Result Date: 3/2/2021  EXAMINATION: XR CHEST 1 VW-03/02/2021:  INDICATION: Weak/Dizzy/AMS, triage protocol.  COMPARISON: 01/16/2021.  FINDINGS: Sternotomy wires and right-sided Port-A-Cath are again noted. The heart is upper normal size. The vasculature appears normal. Mild, mostly perihilar interstitial changes are similar to the prior exam. No lung consolidation, effusion, or pneumothorax is seen.      Impression: Mild nonspecific perihilar interstitial changes, not significantly changed from the prior study. No clearly acute chest pathology is identified.   D:  03/02/2021 E:  03/02/2021  This report was finalized on 3/2/2021 10:51 PM by Dr. Manny Buckley MD.      Ct Facial Bones Without Contrast    Result Date: 3/2/2021  EXAMINATION: CT FACIAL BONES WO CONTRAST- 03/02/2021  INDICATION: Right parotid region mass, AMS  TECHNIQUE: Spiral acquisition 2 mm axial images through the face with sagittal and coronal 2-D reconstructions.  The radiation dose reduction device was turned on for each scan per the  ALARA (As Low as Reasonably Achievable) protocol.  COMPARISON: NONE  FINDINGS: There is diffuse enlargement of the right parotid gland, with reticular pattern of diffuse edema, but no evidence of a discrete parotid mass to suggest underlying tumor. Active inflammation is favored. There is also subcutaneous fat stranding, consistent with edema/inflammation. Right submandibular gland is a little larger than left, but there is minimal if any right-sided mandibular gland inflammation. Left parotid gland appears normal. No parotid calcifications are seen. I do not identify any potential parotid duct calcifications.  Elsewhere, remaining superficial soft tissues of the face, and included deep soft tissues of the neck appear grossly normal. No gross abnormalities are seen in the orbits, or most of the paranasal sinuses. The left frontal sinus is opacified. Milder disease in the right frontal sinus and adjacent ethmoid air cells, and moderate bilateral mastoid disease.  On bone window settings, bony structures appear to be intact. No odontogenic cyst or abscess is seen. No acute or healing trauma is appreciated.      Impression: 1. Generalized enlargement/edema/inflammation of the right parotid gland, and adjacent subcutaneous edema consistent with active inflammation. No visible discrete parotid mass or regional adenopathy. Perhaps mild involvement of the right submandibular gland. 2. Opacified left frontal sinus, milder right-sided frontal sinus disease, and moderate bilateral mastoid disease.  D:  03/02/2021 E:  03/02/2021   This report was finalized on 3/2/2021 10:02 PM by Dr. Manny Buckley MD.        Results for orders placed during the hospital encounter of 01/19/21   Adult Transthoracic Echo Complete W/ Cont if Necessary Per Protocol    Narrative · Estimated left ventricular EF = 55% Left ventricular systolic function   is normal.  · Normal valvular structure and function          Assessment/Plan   Assessment & Plan            Sepsis secondary to UTI (CMS/HCC)    DM2 (diabetes mellitus, type 2) (CMS/HCC)    Prostate cancer metastatic to bone (CMS/HCC)    Essential hypertension    HLD (hyperlipidemia)    GERD without esophagitis    CAD (coronary artery disease)    CHF (congestive heart failure) (CMS/HCC)    Parotitis, acute    Elevated troponin    Hospital Course:  Shlomo Mcallister is a 70 y.o. 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  I spoke with the patient's wife who states that symptoms began approximately a week ago.  Patient was placed on Xtampza for better pain control related to his metastatic disease, but his wife stopped the medication because he was having delusions.  His wife states he has been reporting increased shortness of breath.  Patient has not required oxygen at home.  Presently, patient is requiring 4-5 L per nasal cannula.  Spouse states that he stopped walking approximately 1 week ago because of pain, fatigue, shortness of breath.  He has had nothing substantially for the last 3 to 4 days.  His last bowel movement was approximately a week ago.  Spoke with the wife extensively regarding CODE STATUS.  Patient spouse wishes for him to be a DNR.    Sepsis with AMS  UTI, questionable parotiditis  -CT facial bones shows enlargement/edema/inflammation of the right parotid gland, no definite mass  -CT head shows no acute process  -CT chest shows diffuse sclerotic disease of the visualized bony structures consistent with prostate metastatic disease, small pleural effusion, moderate bilateral lobe atelectasis, new micronodule disease pattern in the lungs questionable for metastatic disease.  -Possible right parotiditis, UTI.  Patient started on Zosyn, vanco  -Consider ENT consultation  -Blood cultures pending  -Fluid resuscitation  -Trend lactic acid    Acute hypoxic respiratory failure  Elevated D-dimer  -CTA chest  to rule out pulmonary emboli -patient receiving fluid resuscitation prior to CTA chest  -Unclear if patient's been taking Xarelto due to AMS  --CTA negative for PE    Elevated troponin  --Trend    MELO  -Baseline creatinine 0.88, presently 1.57    DMII  -Patient is n.p.o. as it is unclear if patient can swallow  -SSI every 6 hours with Accu-Chek every 6 hours  -01/16/21 A1C 9.5    Conjunctivitis  -Erythromycin ointment and warm compresses    DVT prophylaxis: mechanical    CODE STATUS:  DNR  Code Status and Medical Interventions:   Ordered at: 03/02/21 1811     Limited Support to NOT Include:    Intubation     Level Of Support Discussed With:    Next of Kin (If No Surrogate)     Code Status:    No CPR     Medical Interventions (Level of Support Prior to Arrest):    Limited     Comments:    No chest compression. Spoke with the patient's spouse Rosa.     This note has been completed as part of a split-shared workflow.     Signature: Electronically signed by LORETO Huertas, 03/02/21, 5:56 PM EST      Attending   Admission Attestation       I have seen and examined the patient, performing an independent face-to-face diagnostic evaluation with plan of care reviewed and developed with the advanced practice clinician (APC).      Brief Summary Statement:   Shlomo Mcallister is a 70 y.o. male with PMH significant for metastatic prostate cancer to ribs, right leg, brain; CAD, heart failure, T2DM, GERD and HLD.  The patient was brought to the ER today via EMS for decreased LOC.  The patient's wife gave most of the history and states that he started having hallucinations about a week ago after starting Xampza.  She discontinued the medication.  The patient has also had increased SOA and that he has had decreased ambulation due to pain.  His PO intake has decreased drastically.      In the ER, RA sats down to 82% (not on O2 at home), UTI+, but also evidence of parotitis on CT.  DDimer elevated.      Remainder of detailed HPI  is as noted by APC and has been reviewed and/or edited by me for completeness.    Attending Physical Exam:  Constitutional: Asleep, rouses briefly an moans, right back to sleep  Eyes: bilateral exudate  HENT: NCAT, mucous membranes moist  Neck: Supple, no thyromegaly, no lymphadenopathy, trachea midline  Respiratory: Rhonchorous, nonlabored respirations   Cardiovascular: RRR, palpable pedal pulses bilaterally  Gastrointestinal: Positive bowel sounds, soft, nontender, nondistended  Musculoskeletal: No bilateral ankle edema, no clubbing or cyanosis to extremities  Psychiatric: Unable to assess  Neurologic: Unable to assess  Skin: pressure wound left foot      Brief Assessment/Plan :  See detailed assessment and plan developed with APC which I have reviewed and/or edited for completeness.        Admission Status: I believe that this patient meets inpatient criteria due to sepsis secondary to UTI, metabolic encephalopathy, acute hypoxic respiratory failure      Elissa Lawson MD  03/03/21

## 2021-03-03 PROBLEM — R77.8 ELEVATED TROPONIN: Status: ACTIVE | Noted: 2021-01-01

## 2021-03-03 NOTE — PLAN OF CARE
Goal Outcome Evaluation:  Plan of Care Reviewed With: patient  Progress: declining  Outcome Summary: PT eval completed.  Patient presents w/ generalized weakness, decreased functional endurance, functional decline, and impaired cognition.  Pt. lethargic, unable to follow commands or actively participate.  He required dep A x 2 for repositioning in bed; sitting EOB or transfers not safe to attempt.  Skilled IPPT not indicated at this time.  Please reconsult if patient able to actively participate.

## 2021-03-03 NOTE — PLAN OF CARE
Goal Outcome Evaluation:  VSS, 5L NC, Sinus Tach on tele. Pt is non-verbal at this time, arouses to pain, withdraws. Labored breathing, congested, course crackles on lung ausculation. Positive blood culture, provider aware. Continuous fluid on hold given pt's respiratory status, provider aware. Pt has multiple diabetic ulcers, skin tears, abrasions, and scabs. WOC consult in. Will continue to monitor.

## 2021-03-03 NOTE — CONSULTS
Provider, No Known  Consulting physician: Chapin    Chief Complaint   Patient presents with   • Altered Mental Status         HPI: Patient is a 70-year-old male with a history of metastatic prostate cancer.  Wife states that patient has been getting progressively weaker.  States that patient is homebound, getting around in a wheelchair while at home.  Patient has gotten progressively weaker again and was brought in hospital.  Patient was found to have UTI as well as being septic.  Since coming hospital patient has continued to decline with increasing confusion as well as increasing upper airway congestion.      Past Medical History:   Diagnosis Date   • Anemia 1/16/2021   • CAD (coronary artery disease) 1/16/2021   • CHF (congestive heart failure) (CMS/[facility]) 1/16/2021   • DM2 (diabetes mellitus, type 2) (CMS/[facility]) 1/16/2021   • DVT (deep venous thrombosis) (CMS/[facility])    • GERD without esophagitis 1/16/2021   • HLD (hyperlipidemia) 1/16/2021     Past Surgical History:   Procedure Laterality Date   • AL REANOMAL CORON ART PA ORIGIN BY GRAFT     • TURP / TRANSURETHRAL INCISION / DRAINAGE PROSTATE       Current Code Status     Date Active Code Status Order ID Comments User Context       3/3/2021 1254 No CPR [id number]  Faisal Danielle, DO Inpatient     Advance Care Planning Activity      Questions for Current Code Status     Question Answer Comment    Code Status No CPR     Medical Interventions (Level of Support Prior to Arrest) Limited     Limited Support to NOT Include Intubation      Antiarrhythmic Drugs      NIPPV (Non-Invasive Positive Pressure Support)      Vasopressors      Cardioversion/Defibrillation      Artificial Nutrition      Dialysis     Comments No escalation of care         Current Facility-Administered Medications   Medication Dose Route Frequency Provider Last Rate Last Admin   • acetaminophen (TYLENOL) tablet 650 mg  650 mg Oral Q4H PRN Dotty Strong APRN        Or   • acetaminophen (TYLENOL) 160  MG/5ML solution 650 mg  650 mg Oral Q4H PRN Dotty Strong APRN        Or   • acetaminophen (TYLENOL) suppository 650 mg  650 mg Rectal Q4H PRN Dotty Strong APRN   650 mg at 03/03/21 0859   • bisacodyl (DULCOLAX) EC tablet 5 mg  5 mg Oral Daily PRN Dotty Strong APRN       • castor oil-balsam peru (VENELEX) ointment   Topical Q12H Chris Samson MD       • DAPTOmycin (CUBICIN) 450 mg in sodium chloride 0.9 % 50 mL IVPB  6 mg/kg (Adjusted) Intravenous Q24H Cm Moctezuma MD       • dextrose (D50W) 25 g/ 50mL Intravenous Solution 25 g  25 g Intravenous Q15 Min PRN Dotty Strong APRN       • dextrose (GLUTOSE) oral gel 15 g  15 g Oral Q15 Min PRN Dotty Strong APRN       • docusate sodium (COLACE) capsule 100 mg  100 mg Oral BID Dotty Strong APRN   Stopped at 03/02/21 2226   • erythromycin (ROMYCIN) ophthalmic ointment   Both Eyes Q12H Dotty Strong APRN   Given at 03/03/21 0859   • glucagon (human recombinant) (GLUCAGEN DIAGNOSTIC) injection 1 mg  1 mg Subcutaneous Q15 Min PRN Dotty Strong APRN       • glycopyrrolate (ROBINUL) injection 0.2 mg  0.2 mg Intravenous Q4H PRN Faisal Danielle DO       • insulin regular (humuLIN R,novoLIN R) injection 0-9 Units  0-9 Units Subcutaneous Q6H Chris Samson MD   9 Units at 03/03/21 1233   • sodium chloride 0.9 % flush 10 mL  10 mL Intravenous PRN Vazquez Stovall MD       • sodium chloride 0.9 % flush 10 mL  10 mL Intravenous Q12H Dotty Strong APRN       • sodium chloride 0.9 % flush 10 mL  10 mL Intravenous PRN Dotty Strong APRN            •  acetaminophen **OR** acetaminophen **OR** acetaminophen  •  bisacodyl  •  dextrose  •  dextrose  •  glucagon (human recombinant)  •  glycopyrrolate  •  sodium chloride  •  sodium chloride  Allergies   Allergen Reactions   • Uroxatral [Alfuzosin Hcl Er] GI Intolerance     History reviewed. No pertinent family history.  Social History     Socioeconomic History   • Marital status:   "    Spouse name: Not on file   • Number of children: Not on file   • Years of education: Not on file   • Highest education level: Not on file   Tobacco Use   • Smoking status: Former Smoker   • Smokeless tobacco: Never Used   Substance and Sexual Activity   • Alcohol use: Not Currently   • Drug use: Never   • Sexual activity: Defer   Social History Narrative    Patient lives in [place] with Wife, she is his primary caregiver     Review of Systems -all others reviewed and found negative except as mentioned in the HPI      /78 (BP Location: Left arm, Patient Position: Sitting)   Pulse 110   Temp 97.3 °F (36.3 °C) (Axillary)   Resp (!) 38   Ht 167.6 cm (65.98\")   Wt 97.5 kg (215 lb)   SpO2 91%   BMI 34.72 kg/m²     Intake/Output Summary (Last 24 hours) at 3/3/2021 1254  Last data filed at 3/3/2021 0547  Gross per 24 hour   Intake 1176 ml   Output --   Net 1176 ml     Physical Exam:      General Appearance:    Minimally arousable, ill appearing   Head:    Normocephalic, without obvious abnormality, atraumatic   Eyes:            Lids and lashes normal, conjunctivae and sclerae normal, no   icterus, no pallor, corneas clear, PERRLA   Ears:    Ears appear intact with no abnormalities noted   Throat:   No oral lesions, no thrush, oral mucosa moist   Neck:   No adenopathy, supple, trachea midline, no thyromegaly, no   carotid bruit, no JVD   Back:     No kyphosis present, no scoliosis present, no skin lesions,      erythema or scars, no tenderness to percussion or                   palpation,   range of motion normal   Lungs:     Upper air way congestion noted    Heart:    Regular rhythm and normal rate, normal S1 and S2, no            murmur, no gallop, no rub, no click   Chest Wall:    No abnormalities observed   Abdomen:     Normal bowel sounds, no masses, no organomegaly, soft        non-tender, non-distended, no guarding, no rebound                tenderness   Rectal:     Deferred   Extremities:   Moves all " extremities well, no edema, no cyanosis, no             redness   Pulses:   Pulses palpable and equal bilaterally   Skin:   No bleeding, bruising or rash   Lymph nodes:   No palpable adenopathy   Neurologic:   Cranial nerves 2 - 12 grossly intact, sensation intact, DTR       present and equal bilaterally       Results from last 7 days   Lab Units 03/03/21  0554   WBC 10*3/mm3 6.74   HEMOGLOBIN g/dL 8.4*   HEMATOCRIT % 28.5*   PLATELETS 10*3/mm3 45*     Results from last 7 days   Lab Units 03/03/21  0554   SODIUM mmol/L 144   POTASSIUM mmol/L 3.8   CHLORIDE mmol/L 109*   CO2 mmol/L 21.0*   BUN mg/dL 53*   CREATININE mg/dL 1.50*   CALCIUM mg/dL 7.5*   BILIRUBIN mg/dL 0.8   ALK PHOS U/L 511*   ALT (SGPT) U/L 11   AST (SGOT) U/L 48*   GLUCOSE mg/dL 443*     Results from last 7 days   Lab Units 03/03/21  0554   SODIUM mmol/L 144   POTASSIUM mmol/L 3.8   CHLORIDE mmol/L 109*   CO2 mmol/L 21.0*   BUN mg/dL 53*   CREATININE mg/dL 1.50*   GLUCOSE mg/dL 443*   CALCIUM mg/dL 7.5*     Imaging Results (Last 72 Hours)     Procedure Component Value Units Date/Time    XR Chest 1 View [[id number]] Collected: 03/03/21 1227     Updated: 03/03/21 1231    Narrative:      EXAMINATION: XR CHEST 1 VW-      INDICATION: sepsis; R41.82-Altered mental status, unspecified;  N39.0-Urinary tract infection, site not specified; K11.20-Sialoadenitis,  unspecified; S23-Lytoiuvne neoplasm of prostate; C79.51-Secondary  malignant neoplasm of bone      COMPARISON: One day prior     FINDINGS: Right chest wall infusion port projects unchanged. Persistent  bibasilar opacities without new focal lobar consolidation elsewhere.  Suspect trace bilateral pleural effusions. No distinct pneumothorax.  Unchanged heart and mediastinal contours.       Impression:      Right chest wall infusion port projects unchanged.  Persistent bibasilar opacities without new focal lobar consolidation  elsewhere. Suspect trace bilateral pleural effusions. No distinct  pneumothorax.  Unchanged heart and mediastinal contours.     This report was finalized on 3/3/2021 12:28 PM by Jordan Perez.       CT Angiogram Chest With & Without Contrast [[id number]] Collected: 03/03/21 0044     Updated: 03/03/21 0046    Narrative:      CT CHEST WITH CONTRAST, PE PROTOCOL, 3/3/2021    HISTORY:  70-year-old male hospital inpatient with shortness of air. Altered mental status. History of metastatic prostate cancer. Assess for pulmonary embolism.    TECHNIQUE:  CT examination of the chest with IV contrast. CTA MIP multiplanar pulmonary artery images were reformatted with 3-D postprocessing. Radiation dose reduction techniques included automated exposure control. Radiation audit for CT and nuclear cardiology  exams in the last 12 months: 4.    COMPARISON:  *  Noncontrast CT chest, 3/2/2021.    FINDINGS:  No pulmonary embolism is demonstrated. Thoracic aorta is normal in caliber with no aneurysm or dissection. Prior CABG surgery. No pericardial effusion.    Small pleural effusions with consolidation and atelectasis in the dependent posterior lung bases. Nondependent lungs are mostly clear.    Scattered small indeterminate pulmonary nodules within both lungs concerning for metastatic disease. This is unchanged. No visible thoracic or axillary adenopathy. Diffuse sclerotic skeletal metastasis, most readily visible throughout the imaged spine.      Impression:      1.  No evidence of pulmonary embolism or other acute vascular abnormality within the chest.  2.  Consolidation and atelectasis in the dependent posterior lung bases and tiny pleural effusions, unchanged since yesterday.  3.  Scattered tiny pulmonary nodules concerning for metastatic disease. Known diffuse sclerotic skeletal metastasis.    Signer Name: Goldy Gibson MD   Signed: 3/3/2021 12:44 AM   Workstation Name: [email or web address]    [facility]    XR Chest 1 View [[id number]] Collected: 03/02/21 2345     Updated: 03/02/21 2348     Narrative:      CHEST X-RAY, 3/2/2021 (23:21)    HISTORY:    70-year-old male hospital inpatient with hypoxia, altered mental status. Metastatic prostate cancer. History of congestive heart failure.      TECHNIQUE:  AP portable chest x-ray.    COMPARISON:  *  Chest x-ray, 3/2/2021 (13:22).  *  CT chest, 3/2/2021.    FINDINGS:  No change since earlier today. Tiny pleural effusions and bibasilar dependent posterior lung base atelectasis, best seen on the earlier CT study. Mid and upper lungs are clear. Prior CABG surgery. Heart size is normal. Central venous port catheter in  good position.    Subtle scattered pulmonary nodularity, also best seen on the prior CT.      Impression:      Stable portable chest x-ray, unchanged since earlier today.    Signer Name: Golyd Gibson MD   Signed: 3/2/2021 11:45 PM   Workstation Name: [facility]    [facility]    XR Chest 1 View [[id number]] Collected: 03/02/21 1354     Updated: 03/02/21 2254    Narrative:      EXAMINATION: XR CHEST 1 VW-03/02/2021:     INDICATION: Weak/Dizzy/AMS, triage protocol.     COMPARISON: 01/16/2021.     FINDINGS: Sternotomy wires and right-sided Port-A-Cath are again noted.  The heart is upper normal size. The vasculature appears normal. Mild,  mostly perihilar interstitial changes are similar to the prior exam. No  lung consolidation, effusion, or pneumothorax is seen.       Impression:      Mild nonspecific perihilar interstitial changes, not  significantly changed from the prior study. No clearly acute chest  pathology is identified.      D:  03/02/2021  E:  03/02/2021     This report was finalized on 3/2/2021 10:51 PM by Dr. Manny Buckley MD.       CT Head Without Contrast [[id number]] Collected: 03/02/21 1427     Updated: 03/02/21 2206    Narrative:      EXAMINATION: CT HEAD WO CONTRAST-03/02/2021:      INDICATION: AMS.     TECHNIQUE: 5 mm unenhanced images through the brain.     The radiation dose reduction device was  turned on for each scan per the  ALARA (As Low as Reasonably Achievable) protocol.     COMPARISON: 01/19/2021.     FINDINGS: Previous exam report indicates no acute intracranial  abnormality. The calvarium appears intact. There is opacification of  most of the right mastoid air cells, and trace left mastoid disease.  Included paranasal sinuses are mostly clear.     Soft tissue window images show relatively advanced generalized brain  atrophy. There is resulting ventriculomegaly. There is no evidence of  hemorrhage, contusion, edema, mass or mass effect, acute or old infarct,  hydrocephalus, or abnormal extra-axial collection.       Impression:      Moderately advanced generalized cerebral atrophy as on prior  study. No evidence of acute intracranial disease.     D:  03/02/2021  E:  03/02/2021     This report was finalized on 3/2/2021 10:03 PM by Dr. Manny Buckley MD.       CT Facial Bones Without Contrast [[id number]] Collected: 03/02/21 1456     Updated: 03/02/21 2205    Narrative:      EXAMINATION: CT FACIAL BONES WO CONTRAST- 03/02/2021     INDICATION: Right parotid region mass, AMS     TECHNIQUE: Spiral acquisition 2 mm axial images through the face with  sagittal and coronal 2-D reconstructions.     The radiation dose reduction device was turned on for each scan per the  ALARA (As Low as Reasonably Achievable) protocol.     COMPARISON: NONE     FINDINGS: There is diffuse enlargement of the right parotid gland, with  reticular pattern of diffuse edema, but no evidence of a discrete  parotid mass to suggest underlying tumor. Active inflammation is  favored. There is also subcutaneous fat stranding, consistent with  edema/inflammation. Right submandibular gland is a little larger than  left, but there is minimal if any right-sided mandibular gland  inflammation. Left parotid gland appears normal. No parotid  calcifications are seen. I do not identify any potential parotid duct  calcifications.     Elsewhere, remaining  superficial soft tissues of the face, and included  deep soft tissues of the neck appear grossly normal. No gross  abnormalities are seen in the orbits, or most of the paranasal sinuses.  The left frontal sinus is opacified. Milder disease in the right frontal  sinus and adjacent ethmoid air cells, and moderate bilateral mastoid  disease.     On bone window settings, bony structures appear to be intact. No  odontogenic cyst or abscess is seen. No acute or healing trauma is  appreciated.       Impression:      1. Generalized enlargement/edema/inflammation of the right parotid  gland, and adjacent subcutaneous edema consistent with active  inflammation. No visible discrete parotid mass or regional adenopathy.  Perhaps mild involvement of the right submandibular gland.  2. Opacified left frontal sinus, milder right-sided frontal sinus  disease, and moderate bilateral mastoid disease.     D:  03/02/2021  E:  03/02/2021        This report was finalized on 3/2/2021 10:02 PM by Dr. Manny Buckley MD.       CT Chest Without Contrast Diagnostic [[id number]] Collected: 03/02/21 1451     Updated: 03/02/21 2204    Narrative:      EXAMINATION: CT CHEST WO CONTRAST, DIAGNOSTIC-03/02/2021:      INDICATION: Cough, AMS.     TECHNIQUE: 5 mm unenhanced images through the chest and upper abdomen.     The radiation dose reduction device was turned on for each scan per the  ALARA (As Low as Reasonably Achievable) protocol.     COMPARISON: Portable chest radiograph of same date. Angiographic chest  CT scan 02/01/2014.     FINDINGS: History indicates cough and acute mental status change.     Mediastinal window images show no evidence of significant pericardial  effusion, mediastinal adenopathy or mass. There are very small bilateral  pleural effusions. Lung window images show mild-to-moderate bilateral  lower lobe atelectasis, and scattered ill-defined pulmonary  micronodules, mostly 1-3 mm in diameter, but probably more than a dozen  in total,  and greater in the right upper lobe than elsewhere. The bony  structures appear heterogeneous/sclerotic, consistent with the patient's  history of prostate metastatic disease.       Impression:      1. Diffuse sclerotic disease of the visualized bony structures  consistent with the patient's known prostate metastatic disease.  2. Small pleural effusions and moderate bilateral lower lobe  atelectasis.  3. Faint new micronodular disease pattern of the lungs, questionable for  metastatic disease. New granulomatous infection is considered in the  differential.     D:  03/02/2021  E:  03/02/2021     This report was finalized on 3/2/2021 10:01 PM by Dr. Manny Buckley MD.           Impression: Prostate Ca  Upper airway congestion  Change in MS  UTI    Plan: Spoke to patient's wife who is at bedside.  She is very realistic that the patient is probably actively dying.  States that she does not want anything overly aggressive done.  I did briefly mention about stopping antibiotics, however she wants to continue these for present.  I will stop IV fluids as the patient is having significant amount of congestion and IV fluids may start causing more problems.        Faisal Danielle,   03/03/21  12:54 EST

## 2021-03-03 NOTE — PROGRESS NOTES
"Pharmacy Consult-Vancomycin Dosing  Shlomo Mcallister is a  70 y.o. male receiving vancomycin therapy.     Indication: Sepsis, Parotitis  Consulting Provider: Hospitalist  ID Consult:     Goal AUC: 400 - 600 mg/L*hr    Current Antimicrobial Therapy  Anti-Infectives (From admission, onward)      Ordered     Dose/Rate Route Frequency Start Stop    03/02/21 2137  piperacillin-tazobactam (ZOSYN) 3.375 g in iso-osmotic dextrose 50 ml (premix)     Ordering Provider: Elissa Lawson MD    3.375 g  over 4 Hours Intravenous Every 8 Hours 03/03/21 0200 03/08/21 0159 03/02/21 2137  Pharmacy to dose vancomycin     Ordering Provider: Elissa Lawson MD     Does not apply Continuous PRN 03/02/21 2137 03/07/21 2136 03/02/21 1329  vancomycin 2000 mg/500 mL 0.9% NS IVPB ([facility])     Ordering Provider: Vazquez Stovall MD    20 mg/kg × 97.5 kg Intravenous Once 03/02/21 1331 03/02/21 1819    03/02/21 1329  piperacillin-tazobactam (ZOSYN) 3.375 g in iso-osmotic dextrose 50 ml (premix)     Ordering Provider: Vazquez Stovall MD    3.375 g Intravenous Once 03/02/21 1331 03/02/21 1815            Allergies  Allergies as of 03/02/2021 - Reviewed 03/02/2021   Allergen Reaction Noted    Uroxatral [alfuzosin hcl er] GI Intolerance 01/16/2021       Labs    Results from last 7 days   Lab Units 03/02/21  1302   BUN mg/dL 45*   CREATININE mg/dL 1.57*       Results from last 7 days   Lab Units 03/02/21  1302   WBC 10*3/mm3 12.89*       Evaluation of Dosing     Last Dose Received in the ED/Outside Facility: Vancomycin 2000mg in ED  Is Patient on Dialysis or Renal Replacement:     Ht - 167.6 cm (65.98\")  Wt - 97.5 kg (215 lb)    Estimated Creatinine Clearance: 49.2 mL/min (A) (by C-G formula based on SCr of 1.57 mg/dL (H)).    Intake & Output (last 3 days)         02/28 0701 - 03/01 0700 03/01 0701 - 03/02 0700 03/02 0701 - 03/03 0700    IV Piggyback   1000    Total Intake(mL/kg)   1000 (10.3)    Net   +1000                   Microbiology and " Radiology  Microbiology Results (last 10 days)       Procedure Component Value - Date/Time    COVID PRE-OP / PRE-PROCEDURE SCREENING ORDER (NO ISOLATION) - Swab, Nasopharynx [[id number]]  (Normal) Collected: 03/02/21 1436    Lab Status: Final result Specimen: Swab from Nasopharynx Updated: 03/02/21 1557    Narrative:      The following orders were created for panel order COVID PRE-OP / PRE-PROCEDURE SCREENING ORDER (NO ISOLATION) - Swab, Nasopharynx.  Procedure                               Abnormality         Status                     ---------                               -----------         ------                     Respiratory Panel PCR w/...[[id number]]  Normal              Final result                 Please view results for these tests on the individual orders.    Respiratory Panel PCR w/COVID-19(SARS-CoV-2) [facility] In-House, NP Swab in UTM/VTM, 3-4 HR TAT - Swab, Nasopharynx [[id number]]  (Normal) Collected: 03/02/21 1436    Lab Status: Final result Specimen: Swab from Nasopharynx Updated: 03/02/21 1557     ADENOVIRUS, PCR Not Detected     Coronavirus 229E Not Detected     Coronavirus HKU1 Not Detected     Coronavirus NL63 Not Detected     Coronavirus OC43 Not Detected     COVID19 Not Detected     Human Metapneumovirus Not Detected     Human Rhinovirus/Enterovirus Not Detected     Influenza A PCR Not Detected     Influenza B PCR Not Detected     Parainfluenza Virus 1 Not Detected     Parainfluenza Virus 2 Not Detected     Parainfluenza Virus 3 Not Detected     Parainfluenza Virus 4 Not Detected     RSV, PCR Not Detected     Bordetella pertussis pcr Not Detected     Bordetella parapertussis PCR Not Detected     Chlamydophila pneumoniae PCR Not Detected     Mycoplasma pneumo by PCR Not Detected    Narrative:      Fact sheet for providers: https://docs.FanXT/wp-content/uploads/TSK3696-6362-SP7.1-EUA-Provider-Fact-Sheet-3.pdf    Fact sheet for patients:  https://docs.[employer]/wp-content/uploads/FYX1571-7160-JK2.1-EUA-Patient-Fact-Sheet-1.pdf    Test performed by PCR.            Reported Vancomycin Levels              Insight[employer] AUC Calculation:    Current AUC:   mg/L*hr    Predicted Steady State AUC on Current Dose:  mg/L*hr  _________________________________    Predicted Steady State AUC on New Dose:   469 mg/L*hr    Assessment/Plan:     Pharmacy dosing Vancomycin for Sepsis, Parotitis. Goal -600 mg/L*hr  Vancomycin initiated with 2000mg (~20mg/kg) in ED follow with 1250mg q24hr.  Obtain trough prior to 3rd total dose on 3/4   Monitor renal function, culture results, clinical status and adjust as necessary.  Pharmacy will continue to follow.     [place] Harris, PharmD  3/2/2021  21:39 EST

## 2021-03-03 NOTE — CONSULTS
Shlomo Mcallister  1950  [id number]  3/3/2021      Referring Provider: Chris Samson MD hospital medicine  Reason for Consultation: MRSA bacteremia      Subjective   History of present illness: 70-year-old gentleman.  [facility] admit 3/2/2021.  Altered mental status.  History of metastatic prostate carcinoma to bone.  Recent change in chemotherapy with evolution of hallucinations and delusions.  Multiple day history of increasing shortness of breath.  Oxygen dependent at home on 4-5 L continuously.  Recent fall 1 week prior to admission.  Abrasions on left foot, bilateral lower extremities, and back.  Family denies head trauma.  Some chilling.  No definitive fever or night sweats.  CT scan of the brain in the emergency department as work-up for encephalopathy without evidence of mass-effect, bleed, or parameningeal focus of infection.  Incidental finding of right parotid swelling without definitive abscess.  Plain films of the chest with borderline cardiomegaly but no dense alveolar infiltrate.  CT angiography of the chest negative for pulmonary thromboembolic disease but left greater than right basilar infiltrates and pleural effusions.  Nodularity on lung windows consistent with metastatic disease.  Peripheral leukocyte count on admission 12.9.  Lactic acidosis to 2.5.  Acute kidney injury with serum creatinine of 1.57 (questionable baseline).  COVID-19 nasopharyngeal PCR unremarkable.  The remainder of a [email or web address] multiplex viral pathogens panel negative, including influenza A and B.  2 of 2 admission blood culture sets with gram-positive cocci in clusters.  Bio fire multiplex PCR with Staph aureus and positive MEC a gene cassette.  Urine sediment active with 31-50 WBC's with moderate amounts of blood, leukocyte esterase, and bacteriuria.  Urine culture with 100,000 colony-forming units of MRSA as well.  Currently on vancomycin and piperacillin tazobactam.  Asked to assist with antimicrobial therapy.    Past  Medical History:   Diagnosis Date   • Anemia 1/16/2021   • CAD (coronary artery disease) 1/16/2021   • CHF (congestive heart failure) (CMS/[facility]) 1/16/2021   • DM2 (diabetes mellitus, type 2) (CMS/[facility]) 1/16/2021   • DVT (deep venous thrombosis) (CMS/[facility])    • GERD without esophagitis 1/16/2021   • HLD (hyperlipidemia) 1/16/2021       Past Surgical History:   Procedure Laterality Date   • MN REANOMAL CORON ART PA ORIGIN BY GRAFT     • TURP / TRANSURETHRAL INCISION / DRAINAGE PROSTATE         Pediatric History   Patient Parents   • Not on file     Other Topics Concern   • Not on file   Social History Narrative    Patient lives in [place] with Wife, she is his primary caregiver       family history is not on file.    Allergies   Allergen Reactions   • Uroxatral [Alfuzosin Hcl Er] GI Intolerance       Medication: MAR reviewed.  Antibiotics: See below.  Anti-Infectives (From admission, onward)    Ordered     Dose/Rate Route Frequency Start Stop    03/02/21 2201  vancomycin 1250 mg/250 mL 0.9% NS IVPB ([facility])     Ordering Provider: North Harris, PharmD    1,250 mg Intravenous Every 24 Hours 03/03/21 1400 03/09/21 1359    03/02/21 2137  piperacillin-tazobactam (ZOSYN) 3.375 g in iso-osmotic dextrose 50 ml (premix)     Ordering Provider: Elissa Lawson MD    3.375 g  over 4 Hours Intravenous Every 8 Hours 03/03/21 0200 03/08/21 0159    03/02/21 2137  Pharmacy to dose vancomycin     Ordering Provider: Elissa Lawson MD     Does not apply Continuous PRN 03/02/21 2137 03/07/21 2136    03/02/21 1329  vancomycin 2000 mg/500 mL 0.9% NS IVPB ([facility])     Ordering Provider: Vazquez Stovall MD    20 mg/kg × 97.5 kg Intravenous Once 03/02/21 1331 03/02/21 1819    03/02/21 1329  piperacillin-tazobactam (ZOSYN) 3.375 g in iso-osmotic dextrose 50 ml (premix)     Ordering Provider: Vazquez Stovall MD    3.375 g Intravenous Once 03/02/21 1331 03/02/21 1815          Please refer to the medical record for a full medication  "list    Review of Systems  Pertinent items are noted in HPI, all other systems reviewed and negative    Objective     Physical Exam:   Vital Signs   Temp:  [97.9 °F (36.6 °C)-99.5 °F (37.5 °C)] 98.5 °F (36.9 °C)  Heart Rate:  [] 108  Resp:  [28-38] 38  BP: (109-135)/(50-87) 116/59    Blood pressure 116/59, pulse 108, temperature 98.5 °F (36.9 °C), temperature source Axillary, resp. rate (!) 38, height 167.6 cm (65.98\"), weight 97.5 kg (215 lb), SpO2 (!) 89 %.  GENERAL: Obtunded.  No withdrawal to gentle sternal rub or nail plate compression.  HEENT: Dry oral mucosa.  Oropharynx without thrush. Sinuses nontender.  Mild asymmetric swelling of right parotid gland.  No cervical adenopathy. No carotid bruits/ jugular venous distention.   EYES: PERRL. No conjunctival injection. No icterus. EOMI.  LYMPHATICS: No lymphadenopathy of the neck or axillary or inguinal regions.   HEART: No murmur, gallop, or pericardial friction rub.  Tachycardic at 110 bpm.  LUNGS: Clear to auscultation anteriorly. No percussion dullness.  Coarse inspiratory rales in posterior lung bases.  ABDOMEN: Soft, nontender, nondistended. No appreciable HSM.  No peritoneal findings of rebound or guarding.  SKIN: Warm and dry without cutaneous eruptions. No embolic stigmata.   PSYCHIATRIC: Incomplete assessment secondary to encephalopathy.      Results Review:   I reviewed the patient's new clinical results.  I reviewed the patient's new imaging results and agree with the interpretation.    Lab Results   Component Value Date    WBC 6.74 03/03/2021    HGB 8.4 (L) 03/03/2021    HCT 28.5 (L) 03/03/2021    MCV 96.9 03/03/2021    PLT 45 (C) 03/03/2021       Lab Results   Component Value Date    GLUCOSE 443 (C) 03/03/2021    BUN 53 (H) 03/03/2021    CREATININE 1.50 (H) 03/03/2021    EGFRIFNONA 47 (L) 03/03/2021    BCR 35.3 (H) 03/03/2021    CO2 21.0 (L) 03/03/2021    CALCIUM 7.5 (L) 03/03/2021    ALBUMIN 1.50 (L) 03/03/2021    AST 48 (H) 03/03/2021    ALT " 11 03/03/2021       Estimated Creatinine Clearance: 51.5 mL/min (A) (by C-G formula based on SCr of 1.5 mg/dL (H)).      Microbiology: COVID-19 nasopharyngeal PCR negative.  Bio fire viral pulmonary pathogens panel negative.  2/2 admission blood culture sets with gram-positive cocci in clusters.  Bio fire multiplex PCR with positive MEC-A gene cassette.  Urine culture with 10 to the fifth colony-forming units of MRSA as well.      Radiology:  Imaging Results (Last 72 Hours)     Procedure Component Value Units Date/Time    XR Chest 1 View [[id number]] Resulted: 03/03/21 1122     Updated: 03/03/21 1139    CT Angiogram Chest With & Without Contrast [[id number]] Collected: 03/03/21 0044     Updated: 03/03/21 0046    Narrative:      CT CHEST WITH CONTRAST, PE PROTOCOL, 3/3/2021    HISTORY:  70-year-old male hospital inpatient with shortness of air. Altered mental status. History of metastatic prostate cancer. Assess for pulmonary embolism.    TECHNIQUE:  CT examination of the chest with IV contrast. CTA MIP multiplanar pulmonary artery images were reformatted with 3-D postprocessing. Radiation dose reduction techniques included automated exposure control. Radiation audit for CT and nuclear cardiology  exams in the last 12 months: 4.    COMPARISON:  *  Noncontrast CT chest, 3/2/2021.    FINDINGS:  No pulmonary embolism is demonstrated. Thoracic aorta is normal in caliber with no aneurysm or dissection. Prior CABG surgery. No pericardial effusion.    Small pleural effusions with consolidation and atelectasis in the dependent posterior lung bases. Nondependent lungs are mostly clear.    Scattered small indeterminate pulmonary nodules within both lungs concerning for metastatic disease. This is unchanged. No visible thoracic or axillary adenopathy. Diffuse sclerotic skeletal metastasis, most readily visible throughout the imaged spine.      Impression:      1.  No evidence of pulmonary embolism or other acute vascular  abnormality within the chest.  2.  Consolidation and atelectasis in the dependent posterior lung bases and tiny pleural effusions, unchanged since yesterday.  3.  Scattered tiny pulmonary nodules concerning for metastatic disease. Known diffuse sclerotic skeletal metastasis.    Signer Name: Goldy Gibson MD   Signed: 3/3/2021 12:44 AM   Workstation Name: [facility]    XR Chest 1 View [[id number]] Collected: 03/02/21 2345     Updated: 03/02/21 2347    Narrative:      CHEST X-RAY, 3/2/2021 (23:21)    HISTORY:    70-year-old male hospital inpatient with hypoxia, altered mental status. Metastatic prostate cancer. History of congestive heart failure.      TECHNIQUE:  AP portable chest x-ray.    COMPARISON:  *  Chest x-ray, 3/2/2021 (13:22).  *  CT chest, 3/2/2021.    FINDINGS:  No change since earlier today. Tiny pleural effusions and bibasilar dependent posterior lung base atelectasis, best seen on the earlier CT study. Mid and upper lungs are clear. Prior CABG surgery. Heart size is normal. Central venous port catheter in  good position.    Subtle scattered pulmonary nodularity, also best seen on the prior CT.      Impression:      Stable portable chest x-ray, unchanged since earlier today.    Signer Name: Goldy Gisbon MD   Signed: 3/2/2021 11:45 PM   Workstation Name: [facility]    XR Chest 1 View [[id number]] Collected: 03/02/21 1354     Updated: 03/02/21 2254    Narrative:      EXAMINATION: XR CHEST 1 VW-03/02/2021:     INDICATION: Weak/Dizzy/AMS, triage protocol.     COMPARISON: 01/16/2021.     FINDINGS: Sternotomy wires and right-sided Port-A-Cath are again noted.  The heart is upper normal size. The vasculature appears normal. Mild,  mostly perihilar interstitial changes are similar to the prior exam. No  lung consolidation, effusion, or pneumothorax is seen.       Impression:      Mild nonspecific perihilar interstitial  changes, not  significantly changed from the prior study. No clearly acute chest  pathology is identified.      D:  03/02/2021  E:  03/02/2021     This report was finalized on 3/2/2021 10:51 PM by Dr. Manny Buckley MD.       CT Head Without Contrast [[id number]] Collected: 03/02/21 1427     Updated: 03/02/21 2206    Narrative:      EXAMINATION: CT HEAD WO CONTRAST-03/02/2021:      INDICATION: AMS.     TECHNIQUE: 5 mm unenhanced images through the brain.     The radiation dose reduction device was turned on for each scan per the  ALARA (As Low as Reasonably Achievable) protocol.     COMPARISON: 01/19/2021.     FINDINGS: Previous exam report indicates no acute intracranial  abnormality. The calvarium appears intact. There is opacification of  most of the right mastoid air cells, and trace left mastoid disease.  Included paranasal sinuses are mostly clear.     Soft tissue window images show relatively advanced generalized brain  atrophy. There is resulting ventriculomegaly. There is no evidence of  hemorrhage, contusion, edema, mass or mass effect, acute or old infarct,  hydrocephalus, or abnormal extra-axial collection.       Impression:      Moderately advanced generalized cerebral atrophy as on prior  study. No evidence of acute intracranial disease.     D:  03/02/2021  E:  03/02/2021     This report was finalized on 3/2/2021 10:03 PM by Dr. Manny Buckley MD.       CT Facial Bones Without Contrast [[id number]] Collected: 03/02/21 1456     Updated: 03/02/21 2205    Narrative:      EXAMINATION: CT FACIAL BONES WO CONTRAST- 03/02/2021     INDICATION: Right parotid region mass, AMS     TECHNIQUE: Spiral acquisition 2 mm axial images through the face with  sagittal and coronal 2-D reconstructions.     The radiation dose reduction device was turned on for each scan per the  ALARA (As Low as Reasonably Achievable) protocol.     COMPARISON: NONE     FINDINGS: There is diffuse enlargement of the right parotid gland, with  reticular  pattern of diffuse edema, but no evidence of a discrete  parotid mass to suggest underlying tumor. Active inflammation is  favored. There is also subcutaneous fat stranding, consistent with  edema/inflammation. Right submandibular gland is a little larger than  left, but there is minimal if any right-sided mandibular gland  inflammation. Left parotid gland appears normal. No parotid  calcifications are seen. I do not identify any potential parotid duct  calcifications.     Elsewhere, remaining superficial soft tissues of the face, and included  deep soft tissues of the neck appear grossly normal. No gross  abnormalities are seen in the orbits, or most of the paranasal sinuses.  The left frontal sinus is opacified. Milder disease in the right frontal  sinus and adjacent ethmoid air cells, and moderate bilateral mastoid  disease.     On bone window settings, bony structures appear to be intact. No  odontogenic cyst or abscess is seen. No acute or healing trauma is  appreciated.       Impression:      1. Generalized enlargement/edema/inflammation of the right parotid  gland, and adjacent subcutaneous edema consistent with active  inflammation. No visible discrete parotid mass or regional adenopathy.  Perhaps mild involvement of the right submandibular gland.  2. Opacified left frontal sinus, milder right-sided frontal sinus  disease, and moderate bilateral mastoid disease.     D:  03/02/2021  E:  03/02/2021        This report was finalized on 3/2/2021 10:02 PM by Dr. Manny Buckley MD.       CT Chest Without Contrast Diagnostic [[id number]] Collected: 03/02/21 1451     Updated: 03/02/21 2204    Narrative:      EXAMINATION: CT CHEST WO CONTRAST, DIAGNOSTIC-03/02/2021:      INDICATION: Cough, AMS.     TECHNIQUE: 5 mm unenhanced images through the chest and upper abdomen.     The radiation dose reduction device was turned on for each scan per the  ALARA (As Low as Reasonably Achievable) protocol.     COMPARISON: Portable chest  radiograph of same date. Angiographic chest  CT scan 02/01/2014.     FINDINGS: History indicates cough and acute mental status change.     Mediastinal window images show no evidence of significant pericardial  effusion, mediastinal adenopathy or mass. There are very small bilateral  pleural effusions. Lung window images show mild-to-moderate bilateral  lower lobe atelectasis, and scattered ill-defined pulmonary  micronodules, mostly 1-3 mm in diameter, but probably more than a dozen  in total, and greater in the right upper lobe than elsewhere. The bony  structures appear heterogeneous/sclerotic, consistent with the patient's  history of prostate metastatic disease.       Impression:      1. Diffuse sclerotic disease of the visualized bony structures  consistent with the patient's known prostate metastatic disease.  2. Small pleural effusions and moderate bilateral lower lobe  atelectasis.  3. Faint new micronodular disease pattern of the lungs, questionable for  metastatic disease. New granulomatous infection is considered in the  differential.     D:  03/02/2021  E:  03/02/2021     This report was finalized on 3/2/2021 10:01 PM by Dr. Manny Buckley MD.             ASSESSMENT AND PLAN: Severe sepsis.  Hypoxemic respiratory failure.  Septic encephalopathy with altered mental status.  MRSA bacteremia.  MRSA urinary tract infection/10 to the fifth colony-forming units.  Bibasilar consolidation.  Bilateral pleural effusions.  Leukocytosis.  Lactic acidosis.  Acute kidney injury.  Elevated brain natruretic peptide.  Metastatic prostate carcinoma to bone/? Pulmonary parenchyma.  ASCVD.  Left ventricular systolic dysfunction/increased brain natruretic peptide.  Type 2 diabetes mellitus with markedly elevated hemoglobin A1c.  Recent fall with abrasions to left foot, bilateral lower extremities, and back/questionable cutaneous portal for bacteremia.  Worsening pulmonary nodularity thought to be neoplastic/questionable  lymphangitic tumor spread.  Right parotitis questionable sialadenitis. Thrombocytopenia/rule out DIC.  Maximum temperature over 24 hours 99.5.  Currently 98.5.  Pulse 108, respiratory rate 38, blood pressure 116/59 with an O2 saturation of 89% on 5 L by nasal cannula.  Peripheral leukocyte count on admission 12.9.  Currently 6.7 with 68% segmented neutrophils, 6 band forms, and 2  NRBC's.  Given the patient's widely metastatic prostate carcinoma and pre-existing DNR status, I would not proceed with transesophageal echocardiography at this point in time to exclude an endovascular focus.  Transthoracic study would be reasonable as the presence of a sizable vegetation would dictate a longer course of antimicrobial infusion therapy.  It is not entirely clear where the source of the MRSA bacteremia has come from.  Cutaneous portal of entry from recent fall and multiple abrasions is possible.  Bibasilar consolidation with air bronchograms are noted on chest CT which may reflect a community-acquired MRSA pneumonia following influenza.  Certainly an obstructive uropathy related to prostate carcinoma with secondary bacteremia is possible; however, MRSA is not a typical genitourinary pathogen.  The most common organism to cause bacteremia in the setting of sialadenitis/acute parotitis is Staph aureus.  This raises the possibility as to whether or not the patient's renal parenchyma has been seeded by transient bacteremia from a non-urinary source.  Pending HUYEN data, I am going to discontinue both vancomycin and piperacillin tazobactam and begin daptomycin 6 mg/kg IV every 24 hours.  I will defer to the primary team with regards to ordering a transthoracic screening echocardiogram.  The patient's D-dimer is markedly elevated at 19.9.  A serum fibrinogen and fibrin degradation products may be helpful to exclude disseminated intravascular coagulation as a complication of staphylococcal bacteremia.  Complex medical decision  making.  Guarded prognosis.       I discussed the patients findings and my recommendations with patient and family    Thank you for this consult.  Our group would be pleased to follow this patient over the course of their hospitalization and assist with outpatient antimicrobial therapy, as indicated.     Cm Moctezuma MD  3/3/2021

## 2021-03-03 NOTE — NURSING NOTE
[facility]OC consult for bilateral foot/ankle and toe wounds and right upper back:     Patient presents with a right upper back friction area and a right elbow skin tear.   Treat with Xeroform and Foam dressing daily.     Left medial and right lateral ankle present with DTPIs, intact, maroon, and non-blanching.   Will order Venelex ointment BID and placed Optifoam dressing.   Will hold off on MIST therapy at this time r/t unestablished goals of care.     Left great toe and right second toe present with neuropathic ulcerations and DTPIs.   Left great toe ulceration wound bed is dry and crusted.   DTPIs present at the proximal and distal end of his toe.   They are intact and stable at this time.  Right 2nd toe wound bed is moist, mild sloughing, and scant tan drainage.   Areas cleaned and irrigated with Betadine.   Apply Xeroform, gauze, wrapped with Kerlix, and then secure with stockinet.     Bottom, coccyx, and heels are intact and blanching.   Offloading heel boots in place.   Currently on a waffle mattress.   Saman of 12.     Ordered a [facility] specialty mattress for pressure and moisture management.     See wound and skin care orders for care needs.   Will continue to follow at this time.  Contact [facility] if needs arise.     Thanks

## 2021-03-03 NOTE — PROGRESS NOTES
Discharge Planning Assessment  [facility]     Patient Name: Shlomo Mcallister  MRN: [id number]  Today's Date: 3/3/2021    Admit Date: 3/2/2021    Discharge Needs Assessment     Row Name 03/03/21 1421       Living Environment    Lives With  spouse    Name(s) of Who Lives With Patient  Rosa Mcallister (spouse) [phone]    Primary Care Provided by  self    Provides Primary Care For  no one, unable/limited ability to care for self    Family Caregiver if Needed  child(mera), adult;spouse    Family Caregiver Names  Rosa Mcallister (spouse) [phone]    Quality of Family Relationships  helpful;involved;supportive    Able to Return to Prior Arrangements  yes       Resource/Environmental Concerns    Resource/Environmental Concerns  none       Transition Planning    Patient/Family Anticipates Transition to  home with help/services    Patient/Family Anticipated Services at Transition      Transportation Anticipated  health plan transportation       Discharge Needs Assessment    Readmission Within the Last 30 Days  no previous admission in last 30 days    Equipment Currently Used at Home  walker, rolling    Concerns to be Addressed  discharge planning    Anticipated Changes Related to Illness  inability to care for self    Current Discharge Risk  terminally ill        Discharge Plan     Row Name 03/03/21 1422       Plan    Plan  Home with family    Patient/Family in Agreement with Plan  yes    Plan Comments  Patient staying in [facility] with wife and son.  Lives between [place] and [place].  He is dependent on most ADLs.  Wife and son assist at home.  He has a rollator as needed.  Patient is not current with home health or services.  Will continue to follow for discharge planning.    Final Discharge Disposition Code  01 - home or self-care        Continued Care and Services - Admitted Since 3/2/2021    Coordination has not been started for this encounter.       Expected Discharge Date and Time     Expected Discharge  Date Expected Discharge Time    Mar 6, 2021         Demographic Summary     Row Name 03/03/21 1419       General Information    Admission Type  inpatient    Arrived From  home    Referral Source  admission list    Reason for Consult  discharge planning    Preferred Language  English     Used During This Interaction  no    General Information Comments  PCP is Cathy WEI.  Patient has [employer] Medicare rep.  He has prescription drug coverage and is able to afford medications.        Functional Status     Row Name 03/03/21 1420       Functional Status    Usual Activity Tolerance  poor    Current Activity Tolerance  poor       Functional Status, IADL    Medications  completely dependent    Meal Preparation  completely dependent    Housekeeping  completely dependent    Laundry  completely dependent    Shopping  completely dependent        Psychosocial    No documentation.       Abuse/Neglect    No documentation.       Legal    No documentation.       Substance Abuse    No documentation.       Patient Forms    No documentation.           Irene Webb RN

## 2021-03-03 NOTE — PROGRESS NOTES
[facility]  PROGRESS NOTE    Patient Name: Shlomo Mcallister  : 1950  MRN: [id number]    Date of Admission: 3/2/2021  Primary Care Physician: Provider, No Known    Subjective   Subjective     CC:  sepsis    HPI:  Patient unresponsive to verbal and tactile stimuli    ROS:  Unable to obtain due to patient AMS    Objective   Objective     Vital Signs:   Temp:  [97.9 °F (36.6 °C)-99.5 °F (37.5 °C)] 98.6 °F (37 °C)  Heart Rate:  [] 100  Resp:  [28-38] 34  BP: (109-135)/(50-87) 134/63        Physical Exam:  Constitutional - ill appearing, in bed  HEENT-NCAT, mucous membranes dry, right face in front of jaw swollen and firm, extending behind angle of jaw.  CV-tachycardic, regular  Resp-coarse breath sounds bilaterally, tachypnea  Abd-soft, nontender, nondistended, normoactive bowel sounds  Ext-trace edema LE bilat  Neuro-does not respond to voice but will open eyes with tactile stimuli  Psych-flat affect   Skin- ulceration with underlying purulence left medial ankle, approx 4 cm diameter. Scalloped wound left great toe with erythema. Ulceration on 2nd toe right foot with surrounding erythema      Results Reviewed:  Results from last 7 days   Lab Units 21  0554 21  1302   WBC 10*3/mm3 6.74 12.89*   HEMOGLOBIN g/dL 8.4* 9.1*   HEMATOCRIT % 28.5* 29.8*   PLATELETS 10*3/mm3 45* 66*     Results from last 7 days   Lab Units 21  0554 21  2255 21  1302   SODIUM mmol/L 144  --  142   POTASSIUM mmol/L 3.8  --  3.9   CHLORIDE mmol/L 109*  --  105   CO2 mmol/L 21.0*  --  27.0   BUN mg/dL 53*  --  45*   CREATININE mg/dL 1.50*  --  1.57*   GLUCOSE mg/dL 443*  --  159*   CALCIUM mg/dL 7.5*  --  8.7   ALT (SGPT) U/L 11  --  13   AST (SGOT) U/L 48*  --  70*   TROPONIN T ng/mL 0.019 0.025 0.051*   PROBNP pg/mL  --  4,031.0*  --      Estimated Creatinine Clearance: 51.5 mL/min (A) (by C-G formula based on SCr of 1.5 mg/dL (H)).    Microbiology Results  Abnormal     Procedure Component Value - Date/Time    Blood Culture - Blood, Arm, Left [[id number]]  (Abnormal) Collected: 03/02/21 1315    Lab Status: Preliminary result Specimen: Blood from Arm, Left Updated: 03/03/21 0542     Blood Culture Abnormal Stain     Gram Stain Aerobic Bottle Gram positive cocci in groups     Comment: PREVIOSULY CALLED       Blood Culture ID, PCR - Blood, Arm, Right [[id number]]  (Abnormal) Collected: 03/02/21 1315    Lab Status: Final result Specimen: Blood from Arm, Right Updated: 03/03/21 0540     BCID, PCR Staphylococcus aureus. mecA (methicillin resistance gene) detected. Identification by BCID PCR.    Blood Culture - Blood, Arm, Right [[id number]]  (Abnormal) Collected: 03/02/21 1315    Lab Status: Preliminary result Specimen: Blood from Arm, Right Updated: 03/03/21 0420     Blood Culture Abnormal Stain     Gram Stain Aerobic Bottle Gram positive cocci in groups      Anaerobic Bottle Gram positive cocci in groups    COVID PRE-OP / PRE-PROCEDURE SCREENING ORDER (NO ISOLATION) - Swab, Nasopharynx [[id number]]  (Normal) Collected: 03/02/21 1436    Lab Status: Final result Specimen: Swab from Nasopharynx Updated: 03/02/21 1557    Narrative:      The following orders were created for panel order COVID PRE-OP / PRE-PROCEDURE SCREENING ORDER (NO ISOLATION) - Swab, Nasopharynx.  Procedure                               Abnormality         Status                     ---------                               -----------         ------                     Respiratory Panel PCR w/...[[id number]]  Normal              Final result                 Please view results for these tests on the individual orders.    Respiratory Panel PCR w/COVID-19(SARS-CoV-2) AMITA/ADITHYA/SB/PAD/COR/MAD/HUMBLE In-House, NP Swab in UTM/VTM, 3-4 HR TAT - Swab, Nasopharynx [[id number]]  (Normal) Collected: 03/02/21 1436    Lab Status: Final result Specimen: Swab from Nasopharynx Updated: 03/02/21 1557     ADENOVIRUS, PCR Not Detected      Coronavirus 229E Not Detected     Coronavirus HKU1 Not Detected     Coronavirus NL63 Not Detected     Coronavirus OC43 Not Detected     COVID19 Not Detected     Human Metapneumovirus Not Detected     Human Rhinovirus/Enterovirus Not Detected     Influenza A PCR Not Detected     Influenza B PCR Not Detected     Parainfluenza Virus 1 Not Detected     Parainfluenza Virus 2 Not Detected     Parainfluenza Virus 3 Not Detected     Parainfluenza Virus 4 Not Detected     RSV, PCR Not Detected     Bordetella pertussis pcr Not Detected     Bordetella parapertussis PCR Not Detected     Chlamydophila pneumoniae PCR Not Detected     Mycoplasma pneumo by PCR Not Detected    Narrative:      Fact sheet for providers: https://docs.[employer]/wp-content/uploads/MZR0631-0255-SO5.1-EUA-Provider-Fact-Sheet-3.pdf    Fact sheet for patients: https://docs.[employer]/wp-content/uploads/ITH6086-1978-MY3.1-EUA-Patient-Fact-Sheet-1.pdf    Test performed by PCR.          Imaging Results (Last 24 Hours)     Procedure Component Value Units Date/Time    CT Angiogram Chest With & Without Contrast [[id number]] Collected: 03/03/21 0044     Updated: 03/03/21 0046    Narrative:      CT CHEST WITH CONTRAST, PE PROTOCOL, 3/3/2021    HISTORY:  70-year-old male hospital inpatient with shortness of air. Altered mental status. History of metastatic prostate cancer. Assess for pulmonary embolism.    TECHNIQUE:  CT examination of the chest with IV contrast. CTA MIP multiplanar pulmonary artery images were reformatted with 3-D postprocessing. Radiation dose reduction techniques included automated exposure control. Radiation audit for CT and nuclear cardiology  exams in the last 12 months: 4.    COMPARISON:  *  Noncontrast CT chest, 3/2/2021.    FINDINGS:  No pulmonary embolism is demonstrated. Thoracic aorta is normal in caliber with no aneurysm or dissection. Prior CABG surgery. No pericardial effusion.    Small pleural effusions with consolidation and  atelectasis in the dependent posterior lung bases. Nondependent lungs are mostly clear.    Scattered small indeterminate pulmonary nodules within both lungs concerning for metastatic disease. This is unchanged. No visible thoracic or axillary adenopathy. Diffuse sclerotic skeletal metastasis, most readily visible throughout the imaged spine.      Impression:      1.  No evidence of pulmonary embolism or other acute vascular abnormality within the chest.  2.  Consolidation and atelectasis in the dependent posterior lung bases and tiny pleural effusions, unchanged since yesterday.  3.  Scattered tiny pulmonary nodules concerning for metastatic disease. Known diffuse sclerotic skeletal metastasis.    Signer Name: Goldy Gibson MD   Signed: 3/3/2021 12:44 AM   Workstation Name: [email or web address]    Radiology [facility]    XR Chest 1 View [[id number]] Collected: 03/02/21 2345     Updated: 03/02/21 2347    Narrative:      CHEST X-RAY, 3/2/2021 (23:21)    HISTORY:    70-year-old male hospital inpatient with hypoxia, altered mental status. Metastatic prostate cancer. History of congestive heart failure.      TECHNIQUE:  AP portable chest x-ray.    COMPARISON:  *  Chest x-ray, 3/2/2021 (13:22).  *  CT chest, 3/2/2021.    FINDINGS:  No change since earlier today. Tiny pleural effusions and bibasilar dependent posterior lung base atelectasis, best seen on the earlier CT study. Mid and upper lungs are clear. Prior CABG surgery. Heart size is normal. Central venous port catheter in  good position.    Subtle scattered pulmonary nodularity, also best seen on the prior CT.      Impression:      Stable portable chest x-ray, unchanged since earlier today.    Signer Name: Goldy Gibson MD   Signed: 3/2/2021 11:45 PM   Workstation Name: [id number]    Radiology [facility]    XR Chest 1 View [[id number]] Collected: 03/02/21 1354     Updated: 03/02/21 8224    Narrative:      EXAMINATION: XR CHEST 1  VW-03/02/2021:     INDICATION: Weak/Dizzy/AMS, triage protocol.     COMPARISON: 01/16/2021.     FINDINGS: Sternotomy wires and right-sided Port-A-Cath are again noted.  The heart is upper normal size. The vasculature appears normal. Mild,  mostly perihilar interstitial changes are similar to the prior exam. No  lung consolidation, effusion, or pneumothorax is seen.       Impression:      Mild nonspecific perihilar interstitial changes, not  significantly changed from the prior study. No clearly acute chest  pathology is identified.      D:  03/02/2021  E:  03/02/2021     This report was finalized on 3/2/2021 10:51 PM by Dr. Manny Buckley MD.       CT Head Without Contrast [[id number]] Collected: 03/02/21 1427     Updated: 03/02/21 2206    Narrative:      EXAMINATION: CT HEAD WO CONTRAST-03/02/2021:      INDICATION: AMS.     TECHNIQUE: 5 mm unenhanced images through the brain.     The radiation dose reduction device was turned on for each scan per the  ALARA (As Low as Reasonably Achievable) protocol.     COMPARISON: 01/19/2021.     FINDINGS: Previous exam report indicates no acute intracranial  abnormality. The calvarium appears intact. There is opacification of  most of the right mastoid air cells, and trace left mastoid disease.  Included paranasal sinuses are mostly clear.     Soft tissue window images show relatively advanced generalized brain  atrophy. There is resulting ventriculomegaly. There is no evidence of  hemorrhage, contusion, edema, mass or mass effect, acute or old infarct,  hydrocephalus, or abnormal extra-axial collection.       Impression:      Moderately advanced generalized cerebral atrophy as on prior  study. No evidence of acute intracranial disease.     D:  03/02/2021  E:  03/02/2021     This report was finalized on 3/2/2021 10:03 PM by Dr. Manny Buckley MD.       CT Facial Bones Without Contrast [[id number]] Collected: 03/02/21 1456     Updated: 03/02/21 2205    Narrative:      EXAMINATION: CT  FACIAL BONES WO CONTRAST- 03/02/2021     INDICATION: Right parotid region mass, AMS     TECHNIQUE: Spiral acquisition 2 mm axial images through the face with  sagittal and coronal 2-D reconstructions.     The radiation dose reduction device was turned on for each scan per the  ALARA (As Low as Reasonably Achievable) protocol.     COMPARISON: NONE     FINDINGS: There is diffuse enlargement of the right parotid gland, with  reticular pattern of diffuse edema, but no evidence of a discrete  parotid mass to suggest underlying tumor. Active inflammation is  favored. There is also subcutaneous fat stranding, consistent with  edema/inflammation. Right submandibular gland is a little larger than  left, but there is minimal if any right-sided mandibular gland  inflammation. Left parotid gland appears normal. No parotid  calcifications are seen. I do not identify any potential parotid duct  calcifications.     Elsewhere, remaining superficial soft tissues of the face, and included  deep soft tissues of the neck appear grossly normal. No gross  abnormalities are seen in the orbits, or most of the paranasal sinuses.  The left frontal sinus is opacified. Milder disease in the right frontal  sinus and adjacent ethmoid air cells, and moderate bilateral mastoid  disease.     On bone window settings, bony structures appear to be intact. No  odontogenic cyst or abscess is seen. No acute or healing trauma is  appreciated.       Impression:      1. Generalized enlargement/edema/inflammation of the right parotid  gland, and adjacent subcutaneous edema consistent with active  inflammation. No visible discrete parotid mass or regional adenopathy.  Perhaps mild involvement of the right submandibular gland.  2. Opacified left frontal sinus, milder right-sided frontal sinus  disease, and moderate bilateral mastoid disease.     D:  03/02/2021  E:  03/02/2021        This report was finalized on 3/2/2021 10:02 PM by Dr. Manny Buckley MD.       CT  Chest Without Contrast Diagnostic [[id number]] Collected: 03/02/21 1451     Updated: 03/02/21 2204    Narrative:      EXAMINATION: CT CHEST WO CONTRAST, DIAGNOSTIC-03/02/2021:      INDICATION: Cough, AMS.     TECHNIQUE: 5 mm unenhanced images through the chest and upper abdomen.     The radiation dose reduction device was turned on for each scan per the  ALARA (As Low as Reasonably Achievable) protocol.     COMPARISON: Portable chest radiograph of same date. Angiographic chest  CT scan 02/01/2014.     FINDINGS: History indicates cough and acute mental status change.     Mediastinal window images show no evidence of significant pericardial  effusion, mediastinal adenopathy or mass. There are very small bilateral  pleural effusions. Lung window images show mild-to-moderate bilateral  lower lobe atelectasis, and scattered ill-defined pulmonary  micronodules, mostly 1-3 mm in diameter, but probably more than a dozen  in total, and greater in the right upper lobe than elsewhere. The bony  structures appear heterogeneous/sclerotic, consistent with the patient's  history of prostate metastatic disease.       Impression:      1. Diffuse sclerotic disease of the visualized bony structures  consistent with the patient's known prostate metastatic disease.  2. Small pleural effusions and moderate bilateral lower lobe  atelectasis.  3. Faint new micronodular disease pattern of the lungs, questionable for  metastatic disease. New granulomatous infection is considered in the  differential.     D:  03/02/2021  E:  03/02/2021     This report was finalized on 3/2/2021 10:01 PM by Dr. Manny Buckley MD.             Results for orders placed during the hospital encounter of 01/19/21   Adult Transthoracic Echo Complete W/ Cont if Necessary Per Protocol    Narrative · Estimated left ventricular EF = 55% Left ventricular systolic function   is normal.  · Normal valvular structure and function          I have reviewed the  medications:  Scheduled Meds:[START ON 3/4/2021] Pharmacy Consult, , Does not apply, Once  docusate sodium, 100 mg, Oral, BID  erythromycin, , Both Eyes, Q12H  insulin regular, 0-7 Units, Subcutaneous, Q6H  piperacillin-tazobactam, 3.375 g, Intravenous, Q8H  sodium chloride, 10 mL, Intravenous, Q12H  vancomycin, 1,250 mg, Intravenous, Q24H      Continuous Infusions:Pharmacy to dose vancomycin,       PRN Meds:.acetaminophen **OR** acetaminophen **OR** acetaminophen  •  bisacodyl  •  dextrose  •  dextrose  •  glucagon (human recombinant)  •  Pharmacy to dose vancomycin  •  sodium chloride  •  sodium chloride    Assessment/Plan   Assessment & Plan     Active Hospital Problems    Diagnosis  POA   • **Sepsis secondary to UTI (CMS/[facility]) [A41.9, N39.0]  Yes   • Elevated troponin [R77.8]  Yes   • Parotitis, acute [K11.21]  Yes   • CHF (congestive heart failure) (CMS/[facility]) [I50.9]  Yes   • DM2 (diabetes mellitus, type 2) (CMS/[facility]) [E11.9]  Yes   • Prostate cancer metastatic to bone (CMS/[facility]) [C61, C79.51]  Yes   • Essential hypertension [I10]  Yes   • HLD (hyperlipidemia) [E78.5]  Yes   • GERD without esophagitis [K21.9]  Yes   • CAD (coronary artery disease) [I25.10]  Yes      Resolved Hospital Problems   No resolved problems to display.        Brief Hospital Course to date:  Shlomo Mcallister is a 70 y.o. male with history of CAD, h/o CABG and coronary stents, h/o bilateral pulmonary emboli on chronic xarelto, anemia, HTN, CHF, DM 2, GERD, hyperlipidemia and prostate cancer with metastatic disease to ribs, right leg and brain presents with altered mental status.    Sepsis  - leukocytosis, altered mental status, thrombocytopenia, Lactic acidosis, MELO, bacteremia, foot ulceration    Cellulitis with ulcerations, bilateral LE  - vancomycin and zosyn, wound care    MRSA bacteremia  - secondary to skin infection, pneumonia or parotitis  - continue vancomycin    Parotitis  - antibiotics as above    UTI  31-50 WBC, 2+ bacteria, mod  "leuk esterase    Possible LLL pneumonia vs atelectasis  - respiratory culture collected    Acute hypoxic respiratory failure  - tachypneic, currently requiring 5 liters supplemental oxygen to maintain oxygen saturations or 96%  - CTA chest negative for PE    Lactic acidosis  - continue IV fluids    Thrombocytopenia  - platelets 45, probably due to sepsis    MELO  - creat 1.5, baseline 0.8 to 1.0    Encephalopathy, toxic metabolic    DMII  - h/o dka  - currently hyperglycemic  - continue IV Fluids    Elevated d-dimer  - CTA chest negative for PE  - elevation possibly due to sepsis  - consider LE duplex    Metastatic prostate cancer  - new lung nodules noted on CTA chest  - recent adjustments to pain medication    Anemia  - hgb 8.4    CHF  - echo 1.20.21 gisella EF 55%    Generalized weakness  Poor po intake    CAD      DVT Prophylaxis:  Held at present due to thrombocytopenia      Disposition: patient appears gravely ill, and may be actively dying. I have attempted multiple times to contact spouse at both phone numbers listed. For now, will proceed with aggressive medical therapy, however this may not be sufficient for patient to recover given his current clinical condition. Have asked nursing staff to alert me when family arrives, and I will continue to attempt to contact family as well.  -addendum 10:43 am -- attempted call spouse again at both listed phone numbers, no answer  -addendum 11:35 am -- reached spouse, she says she has difficulty knowing when her phone is ringing. She is aware of Mr Mcallister's poor clinical condition, and says her primary goal is to keep her  comfortable. He would not want intubation should his respiratory status worsen. Spouse is \"at peace if this is his time to go.\" Agreeable to palliative care consultation. Continuing medical treatment at present. She will be at the hospital this afternoon.    CODE STATUS: DNR  Code Status and Medical Interventions:   Ordered at: 03/02/21 1811    "  Limited Support to NOT Include:    Intubation     Level Of Support Discussed With:    Next of Kin (If No Surrogate)     Code Status:    No CPR     Medical Interventions (Level of Support Prior to Arrest):    Limited     Comments:    No chest compression. Spoke with the patient's spouse Christy Samson MD  03/03/21

## 2021-03-03 NOTE — PLAN OF CARE
Problem: Palliative Care  Goal: Enhanced Quality of Life  Intervention: Optimize Psychosocial Wellbeing  Flowsheets (Taken 3/3/2021 1415)  Supportive Measures:   active listening utilized   decision-making supported   verbalization of feelings encouraged  Family/Support System Care:   self-care encouraged   support provided   presence promoted   Goal Outcome Evaluation:  Plan of Care Reviewed With: patient  Progress: declining  Outcome Summary: Pt has eyes open but unresponsive. Pt is restless, tahcypneic, congested. Pt was nt suctioned and given robinul. prn morphine and ativan ordered for labored breathing and restlessness. Pt's wife Glenna at the bedside. Pt has history of back, legs hips and neck pain related to metatstatic cancer.  continue abx for now.Team Meeting 1300 Faisal Danielle DO, Felicia Carrillo LCSW, Solange Manzano APRN, Jackie Mckeon RN CHPN, Sudarshan Parker MSW, Migdalia Curile RN CHPN, Tiffanie Tan RN, CHPN

## 2021-03-03 NOTE — PROGRESS NOTES
"Pharmacy Consult-Vancomycin Dosing  Shlomo Mcallister is a  70 y.o. male receiving vancomycin therapy.     Indication: Sepsis, Parotitis, MRSA bacteremia  Consulting Provider: Hospitalist  ID Consult:   Goal AUC: 400 - 600 mg/L*hr    Current Antimicrobial Therapy  Anti-Infectives (From admission, onward)      Ordered     Dose/Rate Route Frequency Start Stop    03/02/21 2201  vancomycin 1250 mg/250 mL 0.9% NS IVPB ([facility])     Ordering Provider: North Harris, PharmD    1,250 mg Intravenous Every 24 Hours 03/03/21 1400 03/09/21 1359    03/02/21 2137  piperacillin-tazobactam (ZOSYN) 3.375 g in iso-osmotic dextrose 50 ml (premix)     Ordering Provider: Elissa Lawson MD    3.375 g  over 4 Hours Intravenous Every 8 Hours 03/03/21 0200 03/08/21 0159    03/02/21 2137  Pharmacy to dose vancomycin     Ordering Provider: Elissa Lawson MD     Does not apply Continuous PRN 03/02/21 2137 03/07/21 2136    03/02/21 1329  vancomycin 2000 mg/500 mL 0.9% NS IVPB ([facility])     Ordering Provider: Vazquez Stovall MD    20 mg/kg × 97.5 kg Intravenous Once 03/02/21 1331 03/02/21 1819    03/02/21 1329  piperacillin-tazobactam (ZOSYN) 3.375 g in iso-osmotic dextrose 50 ml (premix)     Ordering Provider: Vazquez Stovall MD    3.375 g Intravenous Once 03/02/21 1331 03/02/21 1815          Allergies  Allergies as of 03/02/2021 - Reviewed 03/02/2021   Allergen Reaction Noted    Uroxatral [alfuzosin hcl er] GI Intolerance 01/16/2021     Labs    Results from last 7 days   Lab Units 03/03/21  0554 03/02/21  1302   BUN mg/dL 53* 45*   CREATININE mg/dL 1.50* 1.57*       Results from last 7 days   Lab Units 03/03/21  0554 03/02/21  1302   WBC 10*3/mm3 6.74 12.89*     Evaluation of Dosing     Last Dose Received in the ED/Outside Facility: Vancomycin 2000mg IV in ED on 3/2 at 1302  Is Patient on Dialysis or Renal Replacement:     Ht - 167.6 cm (65.98\")  Wt - 97.5 kg (215 lb)    Estimated Creatinine Clearance: 51.5 mL/min (A) (by C-G formula based " on SCr of 1.5 mg/dL (H)).    Intake & Output (last 3 days)         02/28 0701 - 03/01 0700 03/01 0701 - 03/02 0700 03/02 0701 - 03/03 0700 03/03 0701 - 03/04 0700    I.V. (mL/kg)   176 (1.8)     IV Piggyback   1000     Total Intake(mL/kg)   1176 (12.1)     Net   +1176             Urine Unmeasured Occurrence   3 x 1 x            Microbiology and Radiology  Microbiology Results (last 10 days)       Procedure Component Value - Date/Time    COVID PRE-OP / PRE-PROCEDURE SCREENING ORDER (NO ISOLATION) - Swab, Nasopharynx [[id number]]  (Normal) Collected: 03/02/21 1436    Lab Status: Final result Specimen: Swab from Nasopharynx Updated: 03/02/21 1272    Narrative:      The following orders were created for panel order COVID PRE-OP / PRE-PROCEDURE SCREENING ORDER (NO ISOLATION) - Swab, Nasopharynx.  Procedure                               Abnormality         Status                     ---------                               -----------         ------                     Respiratory Panel PCR w/...[[id number]]  Normal              Final result                 Please view results for these tests on the individual orders.    Respiratory Panel PCR w/COVID-19(SARS-CoV-2) [facility] In-House, NP Swab in UTM/VTM, 3-4 HR TAT - Swab, Nasopharynx [[id number]]  (Normal) Collected: 03/02/21 1436    Lab Status: Final result Specimen: Swab from Nasopharynx Updated: 03/02/21 4513     ADENOVIRUS, PCR Not Detected     Coronavirus 229E Not Detected     Coronavirus HKU1 Not Detected     Coronavirus NL63 Not Detected     Coronavirus OC43 Not Detected     COVID19 Not Detected     Human Metapneumovirus Not Detected     Human Rhinovirus/Enterovirus Not Detected     Influenza A PCR Not Detected     Influenza B PCR Not Detected     Parainfluenza Virus 1 Not Detected     Parainfluenza Virus 2 Not Detected     Parainfluenza Virus 3 Not Detected     Parainfluenza Virus 4 Not Detected     RSV, PCR Not Detected     Bordetella pertussis pcr  Not Detected     Bordetella parapertussis PCR Not Detected     Chlamydophila pneumoniae PCR Not Detected     Mycoplasma pneumo by PCR Not Detected    Narrative:      Fact sheet for providers: https://docs.[employer]/wp-content/uploads/RWV8687-4078-IO1.1-EUA-Provider-Fact-Sheet-3.pdf    Fact sheet for patients: https://docs.[employer]/wp-content/uploads/YGD4607-4955-XT3.1-EUA-Patient-Fact-Sheet-1.pdf    Test performed by PCR.    Blood Culture - Blood, Arm, Right [[id number]]  (Abnormal) Collected: 03/02/21 1315    Lab Status: Preliminary result Specimen: Blood from Arm, Right Updated: 03/03/21 0420     Blood Culture Abnormal Stain     Gram Stain Aerobic Bottle Gram positive cocci in groups      Anaerobic Bottle Gram positive cocci in groups    Blood Culture - Blood, Arm, Left [[id number]]  (Abnormal) Collected: 03/02/21 1315    Lab Status: Preliminary result Specimen: Blood from Arm, Left Updated: 03/03/21 0542     Blood Culture Abnormal Stain     Gram Stain Aerobic Bottle Gram positive cocci in groups     Comment: PREVIOSULY CALLED       Blood Culture ID, PCR - Blood, Arm, Right [[id number]]  (Abnormal) Collected: 03/02/21 1315    Lab Status: Final result Specimen: Blood from Arm, Right Updated: 03/03/21 0540     BCID, PCR Staphylococcus aureus. mecA (methicillin resistance gene) detected. Identification by BCID PCR.          Reported Vancomycin Levels              [facility] AUC Calculation:    Current AUC: 313  mg/L*hr    Predicted Steady State AUC on Current Dose:  446 mg/L*hr  _________________________________    Predicted Steady State AUC on New Dose:   . mg/L*hr    Assessment/Plan:     1. Pharmacy to dose vancomycin for Sepsis, MRSA Bacteremia, Parotitis..   2. Patient received a loading dose of vancomycin 2000 mg IV x1 in ED on 3/2 at 1415  3. To start maintenance dose of vancomycin 1250 mg IV q24h (~ 12.8 mg/kg).       Patient with MELO, baseline SCr 0.88.   4. Vancomycin trough scheduled for 3/4 at 1300  prior to the third dose. (Goal -600 mg/L*hr).  5. Pharmacy will monitor renal function, cultures and sensitivities, and clinical status, and adjust regimen as necessary.       Jocelyn Arboleda, PharmD  3/3/2021  11:12 EST

## 2021-03-04 PROBLEM — C61 PROSTATE CANCER METASTATIC TO MULTIPLE SITES (HCC): Status: ACTIVE | Noted: 2021-01-01

## 2021-03-04 NOTE — SIGNIFICANT NOTE
Exam confirms with auscultation zero audible heart tones and zero audible respirations. Mr.Leroy Mcallister was pronounced dead at 1458.  MD notified by Patient's RN.    Dede Snyder RN  Clinical House Supervisor  3/4/2021 16:45 EST

## 2021-03-04 NOTE — PLAN OF CARE
Goal Outcome Evaluation:     Progress: declining  Outcome Summary: remain tachy on monitor; breathing is loud and rapid;and using abdominal muscles, 02 sats in low 90's on 5 liters of oxygen; BS continues to run high, MD and PA notified;  due to pt's decline and family wishes no escalation in care is to occur; BS being checked q 6 hrs with sliding scale and levemir added per MD, turned q 2 hrs with oral care;  intermitten bleeding noted from his nares and his mouth; will continue to monitor

## 2021-03-04 NOTE — DISCHARGE PLACEMENT REQUEST
"Shlomo Andres (70 y.o. Male)     Date of Birth Social Security Number Address Home Phone MRN    1950  [address]  [address]  [facility] [address] [phone] [id number]    Mu-ism Marital Status          None        Admission Date Admission Type Admitting Provider Attending Provider Department, Room/Bed    3/2/21 Emergency Carina Dietrich DO [place], Sarah Kathleen, DO [facility] 5G, S545/1    Discharge Date Discharge Disposition Discharge Destination                       Attending Provider: Carina Dietrich DO    Allergies: Uroxatral [Alfuzosin Hcl Er]    Isolation: None   Infection: MRSA (01/20/21)   Code Status: No CPR    Ht: 167.6 cm (65.98\")   Wt: 97.6 kg (215 lb 2.7 oz)    Admission Cmt: None   Principal Problem: Sepsis secondary to UTI (CMS/[facility]) [A41.9,N39.0]                 Active Insurance as of 3/2/2021     Primary Coverage     Payor Plan Insurance Group Employer/Plan Group    HUMANA MEDICARE REPLACEMENT HUMANA MEDICARE REPLACEMENT [id number]     Payor Plan Address Payor Plan Phone Number Payor Plan Fax Number Effective Dates    [address] [address] [phone]  1/1/2018 - None [facility] [address]       Subscriber Name Subscriber Birth Date Member ID       SHLOMO ANDRES 1950 [id number]                 Emergency Contacts      (Rel.) Home Phone Work Phone Mobile Phone    LORELEI ANDRES (Spouse) [phone] -- [phone]            Emergency Contact Information     Name Relation Home Work Mobile    LORELEI ANDRES Spouse [phone]          Insurance Information                HUMANA MEDICARE REPLACEMENT/HUMANA MEDICARE REPLACEMENT Phone: [phone]    Subscriber: Shlomo Andres Subscriber#: [id number]    Group#: [id number] Precert#: [id number]             History & Physical      Elissa Lawson MD at 03/02/21 1755              [facility] Medicine Services  HISTORY AND " PHYSICAL    Patient Name: Shlomo Mcallister  : 1950  MRN: [id number]  Primary Care Physician: Provider, No Known  Date of admission: 3/2/2021    Subjective   Subjective     Chief Complaint:  AMS    HPI:  Shlomo Mcallister is a 70 y.o. 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  I spoke with the patient's wife who states that symptoms began approximately a week ago.  Patient was placed on Xtampza for better pain control related to his metastatic disease, but his wife stopped the medication because he was having delusions.  His wife states he has been reporting increased shortness of breath.  Patient has not required oxygen at home.  Presently, patient is requiring 45 L per nasal cannula.  Spouse states that he stopped walking approximately 1 week ago because of pain, fatigue, shortness of breath.  He has had nothing substantially for the last 3 to 4 days.  His last bowel movement was approximately a week ago.  Spoke with the wife extensively regarding CODE STATUS.  Patient spouse wishes for him to be a DNR. HE had a fall approximately week ago, wife stating no known injury, he did not hit his head.     COVID Details: [x] No Symptoms  Symptoms: [] Fever []  Cough [] Shortness of breath [] Change in taste or smell  Risks:   [] Direct Exposure [] High risk facility   Date of Symptoms:   __  Date of first positive COVID test: __    Review of Systems   Per pt's wife  Gen- No fevers, chills (+) malaise, fatigue, confusion  CV- No chest pain  Resp-  (+) dyspnea  GI- decreased appetite  MS- (+) chronic back pain, hip pain, bilateral leg pain  SKIN- (+) breakdown to left foot, scrap on back and legs  All other systems reviewed and are negative.     Personal History     Past Medical History:   Diagnosis Date   • Anemia 2021   • CAD (coronary artery disease) 2021   • CHF (congestive heart failure) (CMS/[facility])  1/16/2021   • DM2 (diabetes mellitus, type 2) (CMS/[facility]) 1/16/2021   • DVT (deep venous thrombosis) (CMS/[facility])    • GERD without esophagitis 1/16/2021   • HLD (hyperlipidemia) 1/16/2021     Past Surgical History:   Procedure Laterality Date   • DE REANOMAL CORON ART PA ORIGIN BY GRAFT     • TURP / TRANSURETHRAL INCISION / DRAINAGE PROSTATE       Family History: family history is not on file. Otherwise pertinent FHx was reviewed and unremarkable.     Social History:  reports that he has quit smoking. He has never used smokeless tobacco. He reports previous alcohol use. He reports that he does not use drugs.  Social History     Social History Narrative    Patient lives in [place] with Wife, she is his primary caregiver     Medications:  DULoxetine, aspirin, calcium citrate-vitamin d, carvedilol, fish oil, furosemide, gabapentin, insulin regular, isosorbide mononitrate, metFORMIN, multivitamin with minerals, oxyCODONE, pantoprazole, pravastatin, ranolazine, and rivaroxaban    Allergies   Allergen Reactions   • Uroxatral [Alfuzosin Hcl Er] GI Intolerance     Objective   Objective     Vital Signs:   Temp:  [97.9 °F (36.6 °C)-99.5 °F (37.5 °C)] 99.5 °F (37.5 °C)  Heart Rate:  [] 102  Resp:  [28-38] 28  BP: (109-135)/(50-87) 130/64  Flow (L/min):  [2-5] 5    Physical Exam   Constitutional: somnolent, responds to pain when moving legs with moaning.   Eyes:  eyes glued shut with yellow exudate  HENT: NCAT, mucous membranes dry   Neck: Supple, no thyromegaly, no lymphadenopathy, trachea midline  Respiratory: diminished throughout.  Cardiovascular: RRR, no murmurs, rubs, or gallops, palpable pedal pulses bilaterally  Gastrointestinal: Positive bowel sounds, soft, nontender, nondistended, obese  Musculoskeletal: No bilateral ankle edema, no clubbing or cyanosis to extremities  Psychiatric: somnolent- unable to assess  Neurologic: somnolent, unable to follow commands or answer questions. (+) moans to painful stimulus.    Skin: pressure wound to the left foot, scrape to back and right leg    Results Reviewed:  I have personally reviewed most recent indicated data and agree with findings including:  [x]  Laboratory  [x]  Radiology  [x]  EKG/Telemetry  []  Pathology  []  Cardiac/Vascular Studies  [x]  Old records  []  Other:  Most pertinent findings include:  D-Dimer 19.93, WBC 12.89, lactic acid 2.9, urine positive for UTI      LAB RESULTS:      Lab 03/02/21  1901 03/02/21  1302   WBC  --  12.89*   HEMOGLOBIN  --  9.1*   HEMATOCRIT  --  29.8*   PLATELETS  --  66*   NEUTROS ABS  --  10.31*   EOS ABS  --  0.00   MCV  --  96.8   LACTATE 2.5* 2.9*   D DIMER QUANT  --  19.93*         Lab 03/02/21  1302   SODIUM 142   POTASSIUM 3.9   CHLORIDE 105   CO2 27.0   ANION GAP 10.0   BUN 45*   CREATININE 1.57*   GLUCOSE 159*   CALCIUM 8.7   MAGNESIUM 2.2   TSH 2.230         Lab 03/02/21  1302   TOTAL PROTEIN 7.0   ALBUMIN 2.20*   GLOBULIN 4.8   ALT (SGPT) 13   AST (SGOT) 70*   BILIRUBIN 0.8   ALK PHOS 640*         Lab 03/02/21  2255 03/02/21  1302   PROBNP 4,031.0*  --    TROPONIN T 0.025 0.051*                 Lab 03/02/21  1512   PH, ARTERIAL 7.456*   PCO2, ARTERIAL 35.5   PO2 ART 82.2*   FIO2 40   HCO3 ART 25.0   BASE EXCESS ART 1.1   CARBOXYHEMOGLOBIN 1.1     Brief Urine Lab Results  (Last result in the past 365 days)      Color   Clarity   Blood   Leuk Est   Nitrite   Protein   CREAT   Urine HCG        03/02/21 1303 Dark Yellow Turbid Moderate (2+) Moderate (2+) Negative 30 mg/dL (1+)             Microbiology Results (last 10 days)     Procedure Component Value - Date/Time    COVID PRE-OP / PRE-PROCEDURE SCREENING ORDER (NO ISOLATION) - Swab, Nasopharynx [[id number]]  (Normal) Collected: 03/02/21 1436    Lab Status: Final result Specimen: Swab from Nasopharynx Updated: 03/02/21 1557    Narrative:      The following orders were created for panel order COVID PRE-OP / PRE-PROCEDURE SCREENING ORDER (NO ISOLATION) - Swab, Nasopharynx.  Procedure                                Abnormality         Status                     ---------                               -----------         ------                     Respiratory Panel PCR w/...[[id number]]  Normal              Final result                 Please view results for these tests on the individual orders.    Respiratory Panel PCR w/COVID-19(SARS-CoV-2) AMITA/ADITHYA/SB/PAD/COR/MAD/HUMBLE In-House, NP Swab in UTM/VTM, 3-4 HR TAT - Swab, Nasopharynx [[id number]]  (Normal) Collected: 03/02/21 1436    Lab Status: Final result Specimen: Swab from Nasopharynx Updated: 03/02/21 1557     ADENOVIRUS, PCR Not Detected     Coronavirus 229E Not Detected     Coronavirus HKU1 Not Detected     Coronavirus NL63 Not Detected     Coronavirus OC43 Not Detected     COVID19 Not Detected     Human Metapneumovirus Not Detected     Human Rhinovirus/Enterovirus Not Detected     Influenza A PCR Not Detected     Influenza B PCR Not Detected     Parainfluenza Virus 1 Not Detected     Parainfluenza Virus 2 Not Detected     Parainfluenza Virus 3 Not Detected     Parainfluenza Virus 4 Not Detected     RSV, PCR Not Detected     Bordetella pertussis pcr Not Detected     Bordetella parapertussis PCR Not Detected     Chlamydophila pneumoniae PCR Not Detected     Mycoplasma pneumo by PCR Not Detected    Narrative:      Fact sheet for providers: https://docs.[employer]/wp-content/uploads/CVR8236-7138-LS6.1-EUA-Provider-Fact-Sheet-3.pdf    Fact sheet for patients: https://docs.[employer]/wp-content/uploads/ACP0736-7646-LM3.1-EUA-Patient-Fact-Sheet-1.pdf    Test performed by PCR.          Ct Head Without Contrast    Result Date: 3/2/2021  EXAMINATION: CT HEAD WO CONTRAST-03/02/2021:  INDICATION: AMS.  TECHNIQUE: 5 mm unenhanced images through the brain.  The radiation dose reduction device was turned on for each scan per the ALARA (As Low as Reasonably Achievable) protocol.  COMPARISON: 01/19/2021.  FINDINGS: Previous exam report indicates no acute  intracranial abnormality. The calvarium appears intact. There is opacification of most of the right mastoid air cells, and trace left mastoid disease. Included paranasal sinuses are mostly clear.  Soft tissue window images show relatively advanced generalized brain atrophy. There is resulting ventriculomegaly. There is no evidence of hemorrhage, contusion, edema, mass or mass effect, acute or old infarct, hydrocephalus, or abnormal extra-axial collection.      Impression: Moderately advanced generalized cerebral atrophy as on prior study. No evidence of acute intracranial disease.  D:  03/02/2021 E:  03/02/2021  This report was finalized on 3/2/2021 10:03 PM by Dr. Manny Buckley MD.      Ct Chest Without Contrast Diagnostic    Result Date: 3/2/2021  EXAMINATION: CT CHEST WO CONTRAST, DIAGNOSTIC-03/02/2021:  INDICATION: Cough, AMS.  TECHNIQUE: 5 mm unenhanced images through the chest and upper abdomen.  The radiation dose reduction device was turned on for each scan per the ALARA (As Low as Reasonably Achievable) protocol.  COMPARISON: Portable chest radiograph of same date. Angiographic chest CT scan 02/01/2014.  FINDINGS: History indicates cough and acute mental status change.  Mediastinal window images show no evidence of significant pericardial effusion, mediastinal adenopathy or mass. There are very small bilateral pleural effusions. Lung window images show mild-to-moderate bilateral lower lobe atelectasis, and scattered ill-defined pulmonary micronodules, mostly 1-3 mm in diameter, but probably more than a dozen in total, and greater in the right upper lobe than elsewhere. The bony structures appear heterogeneous/sclerotic, consistent with the patient's history of prostate metastatic disease.      Impression: 1. Diffuse sclerotic disease of the visualized bony structures consistent with the patient's known prostate metastatic disease. 2. Small pleural effusions and moderate bilateral lower lobe atelectasis. 3.  Faint new micronodular disease pattern of the lungs, questionable for metastatic disease. New granulomatous infection is considered in the differential.  D:  03/02/2021 E:  03/02/2021  This report was finalized on 3/2/2021 10:01 PM by Dr. Manny Buckley MD.      Ct Angiogram Chest With & Without Contrast    Result Date: 3/3/2021  CT CHEST WITH CONTRAST, PE PROTOCOL, 3/3/2021 HISTORY: 70-year-old male hospital inpatient with shortness of air. Altered mental status. History of metastatic prostate cancer. Assess for pulmonary embolism. TECHNIQUE: CT examination of the chest with IV contrast. CTA MIP multiplanar pulmonary artery images were reformatted with 3-D postprocessing. Radiation dose reduction techniques included automated exposure control. Radiation audit for CT and nuclear cardiology exams in the last 12 months: 4. COMPARISON: *  Noncontrast CT chest, 3/2/2021. FINDINGS: No pulmonary embolism is demonstrated. Thoracic aorta is normal in caliber with no aneurysm or dissection. Prior CABG surgery. No pericardial effusion. Small pleural effusions with consolidation and atelectasis in the dependent posterior lung bases. Nondependent lungs are mostly clear. Scattered small indeterminate pulmonary nodules within both lungs concerning for metastatic disease. This is unchanged. No visible thoracic or axillary adenopathy. Diffuse sclerotic skeletal metastasis, most readily visible throughout the imaged spine.     Impression: 1.  No evidence of pulmonary embolism or other acute vascular abnormality within the chest. 2.  Consolidation and atelectasis in the dependent posterior lung bases and tiny pleural effusions, unchanged since yesterday. 3.  Scattered tiny pulmonary nodules concerning for metastatic disease. Known diffuse sclerotic skeletal metastasis. Signer Name: Goldy Gibson MD  Signed: 3/3/2021 12:44 AM  Workstation [facility]: [facility]  [facility]    Xr Chest 1 View    Result Date:  3/2/2021  CHEST X-RAY, 3/2/2021 (23:21) HISTORY:  70-year-old male hospital inpatient with hypoxia, altered mental status. Metastatic prostate cancer. History of congestive heart failure.  TECHNIQUE: AP portable chest x-ray. COMPARISON: *  Chest x-ray, 3/2/2021 (13:22). *  CT chest, 3/2/2021. FINDINGS: No change since earlier today. Tiny pleural effusions and bibasilar dependent posterior lung base atelectasis, best seen on the earlier CT study. Mid and upper lungs are clear. Prior CABG surgery. Heart size is normal. Central venous port catheter in good position. Subtle scattered pulmonary nodularity, also best seen on the prior CT.     Impression: Stable portable chest x-ray, unchanged since earlier today. Signer Name: Goldy Gibson MD  Signed: 3/2/2021 11:45 PM  Workstation Name: [facility]  [facility]    Xr Chest 1 View    Result Date: 3/2/2021  EXAMINATION: XR CHEST 1 VW-03/02/2021:  INDICATION: Weak/Dizzy/AMS, triage protocol.  COMPARISON: 01/16/2021.  FINDINGS: Sternotomy wires and right-sided Port-A-Cath are again noted. The heart is upper normal size. The vasculature appears normal. Mild, mostly perihilar interstitial changes are similar to the prior exam. No lung consolidation, effusion, or pneumothorax is seen.      Impression: Mild nonspecific perihilar interstitial changes, not significantly changed from the prior study. No clearly acute chest pathology is identified.   D:  03/02/2021 E:  03/02/2021  This report was finalized on 3/2/2021 10:51 PM by Dr. Manny Buckley MD.      Ct Facial Bones Without Contrast    Result Date: 3/2/2021  EXAMINATION: CT FACIAL BONES WO CONTRAST- 03/02/2021  INDICATION: Right parotid region mass, AMS  TECHNIQUE: Spiral acquisition 2 mm axial images through the face with sagittal and coronal 2-D reconstructions.  The radiation dose reduction device was turned on for each scan per the ALARA (As Low as Reasonably Achievable) protocol.  COMPARISON:  NONE  FINDINGS: There is diffuse enlargement of the right parotid gland, with reticular pattern of diffuse edema, but no evidence of a discrete parotid mass to suggest underlying tumor. Active inflammation is favored. There is also subcutaneous fat stranding, consistent with edema/inflammation. Right submandibular gland is a little larger than left, but there is minimal if any right-sided mandibular gland inflammation. Left parotid gland appears normal. No parotid calcifications are seen. I do not identify any potential parotid duct calcifications.  Elsewhere, remaining superficial soft tissues of the face, and included deep soft tissues of the neck appear grossly normal. No gross abnormalities are seen in the orbits, or most of the paranasal sinuses. The left frontal sinus is opacified. Milder disease in the right frontal sinus and adjacent ethmoid air cells, and moderate bilateral mastoid disease.  On bone window settings, bony structures appear to be intact. No odontogenic cyst or abscess is seen. No acute or healing trauma is appreciated.      Impression: 1. Generalized enlargement/edema/inflammation of the right parotid gland, and adjacent subcutaneous edema consistent with active inflammation. No visible discrete parotid mass or regional adenopathy. Perhaps mild involvement of the right submandibular gland. 2. Opacified left frontal sinus, milder right-sided frontal sinus disease, and moderate bilateral mastoid disease.  D:  03/02/2021 E:  03/02/2021   This report was finalized on 3/2/2021 10:02 PM by Dr. Manny Buckley MD.        Results for orders placed during the hospital encounter of 01/19/21   Adult Transthoracic Echo Complete W/ Cont if Necessary Per Protocol    Narrative · Estimated left ventricular EF = 55% Left ventricular systolic function   is normal.  · Normal valvular structure and function          Assessment/Plan   Assessment & Plan       Sepsis secondary to UTI (CMS/HCC)    DM2 (diabetes mellitus,  type 2) (CMS/HCC)    Prostate cancer metastatic to bone (CMS/HCC)    Essential hypertension    HLD (hyperlipidemia)    GERD without esophagitis    CAD (coronary artery disease)    CHF (congestive heart failure) (CMS/HCC)    Parotitis, acute    Elevated troponin    Hospital Course:  Shlomo Mcallister is a 70 y.o. 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  I spoke with the patient's wife who states that symptoms began approximately a week ago.  Patient was placed on Xtampza for better pain control related to his metastatic disease, but his wife stopped the medication because he was having delusions.  His wife states he has been reporting increased shortness of breath.  Patient has not required oxygen at home.  Presently, patient is requiring 4-5 L per nasal cannula.  Spouse states that he stopped walking approximately 1 week ago because of pain, fatigue, shortness of breath.  He has had nothing substantially for the last 3 to 4 days.  His last bowel movement was approximately a week ago.  Spoke with the wife extensively regarding CODE STATUS.  Patient spouse wishes for him to be a DNR.    Sepsis with AMS  UTI, questionable parotiditis  -CT facial bones shows enlargement/edema/inflammation of the right parotid gland, no definite mass  -CT head shows no acute process  -CT chest shows diffuse sclerotic disease of the visualized bony structures consistent with prostate metastatic disease, small pleural effusion, moderate bilateral lobe atelectasis, new micronodule disease pattern in the lungs questionable for metastatic disease.  -Possible right parotiditis, UTI.  Patient started on Zosyn, vanco  -Consider ENT consultation  -Blood cultures pending  -Fluid resuscitation  -Trend lactic acid    Acute hypoxic respiratory failure  Elevated D-dimer  -CTA chest to rule out pulmonary emboli -patient receiving fluid resuscitation  prior to CTA chest  -Unclear if patient's been taking Xarelto due to AMS  --CTA negative for PE    Elevated troponin  --Trend    MELO  -Baseline creatinine 0.88, presently 1.57    DMII  -Patient is n.p.o. as it is unclear if patient can swallow  -SSI every 6 hours with Accu-Chek every 6 hours  -01/16/21 A1C 9.5    Conjunctivitis  -Erythromycin ointment and warm compresses    DVT prophylaxis: mechanical    CODE STATUS:  DNR  Code Status and Medical Interventions:   Ordered at: 03/02/21 1811     Limited Support to NOT Include:    Intubation     Level Of Support Discussed With:    Next of Kin (If No Surrogate)     Code Status:    No CPR     Medical Interventions (Level of Support Prior to Arrest):    Limited     Comments:    No chest compression. Spoke with the patient's spouse Rosa.     This note has been completed as part of a split-shared workflow.     Signature: Electronically signed by LORETO Huertas, 03/02/21, 5:56 PM EST      Attending   Admission Attestation       I have seen and examined the patient, performing an independent face-to-face diagnostic evaluation with plan of care reviewed and developed with the advanced practice clinician (APC).      Brief Summary Statement:   Shlomo Mcallister is a 70 y.o. male with PMH significant for metastatic prostate cancer to ribs, right leg, brain; CAD, heart failure, T2DM, GERD and HLD.  The patient was brought to the ER today via EMS for decreased LOC.  The patient's wife gave most of the history and states that he started having hallucinations about a week ago after starting Xampza.  She discontinued the medication.  The patient has also had increased SOA and that he has had decreased ambulation due to pain.  His PO intake has decreased drastically.      In the ER, RA sats down to 82% (not on O2 at home), UTI+, but also evidence of parotitis on CT.  DDimer elevated.      Remainder of detailed HPI is as noted by APC and has been reviewed and/or edited by me for  completeness.    Attending Physical Exam:  Constitutional: Asleep, rouses briefly an moans, right back to sleep  Eyes: bilateral exudate  HENT: NCAT, mucous membranes moist  Neck: Supple, no thyromegaly, no lymphadenopathy, trachea midline  Respiratory: Rhonchorous, nonlabored respirations   Cardiovascular: RRR, palpable pedal pulses bilaterally  Gastrointestinal: Positive bowel sounds, soft, nontender, nondistended  Musculoskeletal: No bilateral ankle edema, no clubbing or cyanosis to extremities  Psychiatric: Unable to assess  Neurologic: Unable to assess  Skin: pressure wound left foot      Brief Assessment/Plan :  See detailed assessment and plan developed with APC which I have reviewed and/or edited for completeness.        Admission Status: I believe that this patient meets inpatient criteria due to sepsis secondary to UTI, metabolic encephalopathy, acute hypoxic respiratory failure      Elissa Lawson MD  03/03/21                        Electronically signed by Elissa Lawson MD at 03/03/21 0144

## 2021-03-04 NOTE — PROGRESS NOTES
1       Shlomo Mcallister  1950  [id number]  3/4/2021    CC: Altered mental status.    Shlomo Mcallister is a 70 y.o. male here for severe sepsis, MRSA bacteremia, bilateral pulmonary infiltrates with associated effusions, right parotitis, and multiple full-thickness skin lesions in relation to recent falls in the setting of metastatic prostate carcinoma.      Past medical history:  Past Medical History:   Diagnosis Date   • Anemia 1/16/2021   • CAD (coronary artery disease) 1/16/2021   • CHF (congestive heart failure) (CMS/[facility]) 1/16/2021   • DM2 (diabetes mellitus, type 2) (CMS/[facility]) 1/16/2021   • DVT (deep venous thrombosis) (CMS/[facility])    • GERD without esophagitis 1/16/2021   • HLD (hyperlipidemia) 1/16/2021       Medications:   Current Facility-Administered Medications:   •  acetaminophen (TYLENOL) tablet 650 mg, 650 mg, Oral, Q4H PRN **OR** acetaminophen (TYLENOL) 160 MG/5ML solution 650 mg, 650 mg, Oral, Q4H PRN **OR** acetaminophen (TYLENOL) suppository 650 mg, 650 mg, Rectal, Q4H PRN, Dotty Strong APRN, 650 mg at 03/03/21 0859  •  bisacodyl (DULCOLAX) EC tablet 5 mg, 5 mg, Oral, Daily PRN, Dotty Strong APRN  •  castor oil-balsam peru (VENELEX) ointment, , Topical, Q12H, Chris Samson MD  •  DAPTOmycin (CUBICIN) 600 mg in sodium chloride 0.9 % 50 mL IVPB, 8 mg/kg (Adjusted), Intravenous, Q24H, Ghada Christian, PharmD  •  dextrose (D50W) 25 g/ 50mL Intravenous Solution 25 g, 25 g, Intravenous, Q15 Min PRN, Mara Eaton PA  •  dextrose (GLUTOSE) oral gel 15 g, 15 g, Oral, Q15 Min PRN, Mara Eaton PA  •  docusate sodium (COLACE) capsule 100 mg, 100 mg, Oral, BID, Dotty Strong APRN, Stopped at 03/02/21 2226  •  erythromycin (ROMYCIN) ophthalmic ointment, , Both Eyes, Q12H, Dotty Strong APRN, Given at 03/03/21 2150  •  glucagon (human recombinant) (GLUCAGEN DIAGNOSTIC) injection 1 mg, 1 mg, Subcutaneous, Q15 Min PRN, Mara Eaton PA  •  glycopyrrolate (ROBINUL) injection 0.2 mg,  "0.2 mg, Intravenous, Q4H PRN, Faisal Danielle, DO, 0.2 mg at 03/04/21 0546  •  insulin detemir (LEVEMIR) injection 15 Units, 15 Units, Subcutaneous, Nightly, Ramsey George, DO, 15 Units at 03/04/21 0050  •  insulin lispro (humaLOG) injection 0-9 Units, 0-9 Units, Subcutaneous, Q6H, Mara Eaton, PA, 9 Units at 03/04/21 0553  •  LORazepam (ATIVAN) injection 0.5 mg, 0.5 mg, Intravenous, Q4H PRN, Faisal Danielle, DO, 0.5 mg at 03/03/21 1634  •  Morphine sulfate (PF) injection 4 mg, 4 mg, Intravenous, Q1H PRN, Faisal Danielle, DO, 4 mg at 03/04/21 0553  •  sodium chloride 0.9 % flush 10 mL, 10 mL, Intravenous, PRN, Vazquez Stovall MD  •  sodium chloride 0.9 % flush 10 mL, 10 mL, Intravenous, Q12H, Dotty Strong APRN, 10 mL at 03/03/21 2200  •  sodium chloride 0.9 % flush 10 mL, 10 mL, Intravenous, PRN, Dotty Strong APRN  Antibiotics:  Anti-Infectives (From admission, onward)    Ordered     Dose/Rate Route Frequency Start Stop    03/03/21 1541  DAPTOmycin (CUBICIN) 600 mg in sodium chloride 0.9 % 50 mL IVPB     Ordering Provider: Ghada Christian, PharmD    8 mg/kg × 77.3 kg (Adjusted)  100 mL/hr over 30 Minutes Intravenous Every 24 Hours 03/04/21 0900 03/10/21 0859    03/02/21 1329  vancomycin 2000 mg/500 mL 0.9% NS IVPB ([facility])     Ordering Provider: Vazquez Stovall MD    20 mg/kg × 97.5 kg Intravenous Once 03/02/21 1331 03/02/21 1819    03/02/21 1329  piperacillin-tazobactam (ZOSYN) 3.375 g in iso-osmotic dextrose 50 ml (premix)     Ordering Provider: Vazquez Stovall MD    3.375 g Intravenous Once 03/02/21 1331 03/02/21 0885          Allergies:  is allergic to uroxatral [alfuzosin hcl er].    Review of Systems: All other reviewed and negative except as per HPI    Blood pressure 103/46, pulse 109, temperature 99.3 °F (37.4 °C), temperature source Axillary, resp. rate (!) 32, height 167.6 cm (65.98\"), weight 97.6 kg (215 lb 2.7 oz), SpO2 (!) 87 %.  GENERAL: Critically ill.  Obtunded.  Markedly " tachypneic.  Arterial desaturation.  No response to compression of nail plates.  HEENT: Oropharynx without thrush. Sinuses nontender. Dentition in good repair. No cervical adenopathy. No carotid bruits/ jugular venous distention.   EYES: PERRL. No conjunctival injection. No icterus. EOMI.  LYMPHATICS: No lymphadenopathy of the neck or axillary or inguinal regions.   HEART: No murmur, gallop, or pericardial friction rub.  No withdrawal to sternal rub.  LUNGS: Diffuse rhonchi and wet rales to auscultation anteriorly.   ABDOMEN: Soft, nontender, nondistended. No appreciable HSM.  No peritoneal findings of rebound or guarding.  SKIN: Cool and peripherally clamped down. No embolic stigmata.   PSYCHIATRIC: Mental status lucid. Cranial nerve function intact.       DIAGNOSTICS:  Lab Results   Component Value Date    WBC 6.74 03/03/2021    HGB 8.4 (L) 03/03/2021    HCT 28.5 (L) 03/03/2021    PLT 45 (C) 03/03/2021     No results found for: CRP  No results found for: SEDRATE  Lab Results   Component Value Date    GLUCOSE 546 (C) 03/03/2021    BUN 77 (H) 03/03/2021    CREATININE 1.99 (H) 03/03/2021    EGFRIFNONA 34 (L) 03/03/2021    BCR 38.7 (H) 03/03/2021    CO2 22.0 03/03/2021    CALCIUM 7.1 (L) 03/03/2021    ALBUMIN 1.50 (L) 03/03/2021    AST 48 (H) 03/03/2021    ALT 11 03/03/2021       Microbiology: COVID-19 nasopharyngeal PCR negative.  Sputum Gram stain with 3+ WBCs and 4+ gram-positive cocci in groups.  Urine culture with 10 to the 5th CFU MRSA.  Multiple admission blood cultures with MRSA/confirmed by bio fire multiplex PCR.    RADIOLOGY:  Imaging Results (Last 72 Hours)     Procedure Component Value Units Date/Time    XR Chest 1 View [[id number]] Resulted: 03/04/21 0350     Updated: 03/04/21 0357    XR Chest 1 View [[id number]] Collected: 03/03/21 1227     Updated: 03/03/21 1231    Narrative:      EXAMINATION: XR CHEST 1 VW-      INDICATION: sepsis; R41.82-Altered mental status, unspecified;  N39.0-Urinary tract  infection, site not specified; K11.20-Sialoadenitis,  unspecified; M72-Itdpxwujj neoplasm of prostate; C79.51-Secondary  malignant neoplasm of bone      COMPARISON: One day prior     FINDINGS: Right chest wall infusion port projects unchanged. Persistent  bibasilar opacities without new focal lobar consolidation elsewhere.  Suspect trace bilateral pleural effusions. No distinct pneumothorax.  Unchanged heart and mediastinal contours.       Impression:      Right chest wall infusion port projects unchanged.  Persistent bibasilar opacities without new focal lobar consolidation  elsewhere. Suspect trace bilateral pleural effusions. No distinct  pneumothorax. Unchanged heart and mediastinal contours.     This report was finalized on 3/3/2021 12:28 PM by Jordan Perez.       CT Angiogram Chest With & Without Contrast [[id number]] Collected: 03/03/21 0044     Updated: 03/03/21 0046    Narrative:      CT CHEST WITH CONTRAST, PE PROTOCOL, 3/3/2021    HISTORY:  70-year-old male hospital inpatient with shortness of air. Altered mental status. History of metastatic prostate cancer. Assess for pulmonary embolism.    TECHNIQUE:  CT examination of the chest with IV contrast. CTA MIP multiplanar pulmonary artery images were reformatted with 3-D postprocessing. Radiation dose reduction techniques included automated exposure control. Radiation audit for CT and nuclear cardiology  exams in the last 12 months: 4.    COMPARISON:  *  Noncontrast CT chest, 3/2/2021.    FINDINGS:  No pulmonary embolism is demonstrated. Thoracic aorta is normal in caliber with no aneurysm or dissection. Prior CABG surgery. No pericardial effusion.    Small pleural effusions with consolidation and atelectasis in the dependent posterior lung bases. Nondependent lungs are mostly clear.    Scattered small indeterminate pulmonary nodules within both lungs concerning for metastatic disease. This is unchanged. No visible thoracic or axillary adenopathy. Diffuse  sclerotic skeletal metastasis, most readily visible throughout the imaged spine.      Impression:      1.  No evidence of pulmonary embolism or other acute vascular abnormality within the chest.  2.  Consolidation and atelectasis in the dependent posterior lung bases and tiny pleural effusions, unchanged since yesterday.  3.  Scattered tiny pulmonary nodules concerning for metastatic disease. Known diffuse sclerotic skeletal metastasis.    Signer Name: Goldy Gibson MD   Signed: 3/3/2021 12:44 AM   Workstation Name: [facility]    Radiology [facility]    XR Chest 1 View [[id number]] Collected: 03/02/21 2345     Updated: 03/02/21 2347    Narrative:      CHEST X-RAY, 3/2/2021 (23:21)    HISTORY:    70-year-old male hospital inpatient with hypoxia, altered mental status. Metastatic prostate cancer. History of congestive heart failure.      TECHNIQUE:  AP portable chest x-ray.    COMPARISON:  *  Chest x-ray, 3/2/2021 (13:22).  *  CT chest, 3/2/2021.    FINDINGS:  No change since earlier today. Tiny pleural effusions and bibasilar dependent posterior lung base atelectasis, best seen on the earlier CT study. Mid and upper lungs are clear. Prior CABG surgery. Heart size is normal. Central venous port catheter in  good position.    Subtle scattered pulmonary nodularity, also best seen on the prior CT.      Impression:      Stable portable chest x-ray, unchanged since earlier today.    Signer Name: Goldy Gibson MD   Signed: 3/2/2021 11:45 PM   [facility]    XR Chest 1 View [[id number]] Collected: 03/02/21 1354     Updated: 03/02/21 2254    Narrative:      EXAMINATION: XR CHEST 1 VW-03/02/2021:     INDICATION: Weak/Dizzy/AMS, triage protocol.     COMPARISON: 01/16/2021.     FINDINGS: Sternotomy wires and right-sided Port-A-Cath are again noted.  The heart is upper normal size. The vasculature appears normal. Mild,  mostly perihilar  interstitial changes are similar to the prior exam. No  lung consolidation, effusion, or pneumothorax is seen.       Impression:      Mild nonspecific perihilar interstitial changes, not  significantly changed from the prior study. No clearly acute chest  pathology is identified.      D:  03/02/2021  E:  03/02/2021     This report was finalized on 3/2/2021 10:51 PM by Dr. Manny Buckley MD.       CT Head Without Contrast [[id number]] Collected: 03/02/21 1427     Updated: 03/02/21 2206    Narrative:      EXAMINATION: CT HEAD WO CONTRAST-03/02/2021:      INDICATION: AMS.     TECHNIQUE: 5 mm unenhanced images through the brain.     The radiation dose reduction device was turned on for each scan per the  ALARA (As Low as Reasonably Achievable) protocol.     COMPARISON: 01/19/2021.     FINDINGS: Previous exam report indicates no acute intracranial  abnormality. The calvarium appears intact. There is opacification of  most of the right mastoid air cells, and trace left mastoid disease.  Included paranasal sinuses are mostly clear.     Soft tissue window images show relatively advanced generalized brain  atrophy. There is resulting ventriculomegaly. There is no evidence of  hemorrhage, contusion, edema, mass or mass effect, acute or old infarct,  hydrocephalus, or abnormal extra-axial collection.       Impression:      Moderately advanced generalized cerebral atrophy as on prior  study. No evidence of acute intracranial disease.     D:  03/02/2021  E:  03/02/2021     This report was finalized on 3/2/2021 10:03 PM by Dr. Manny Buckley MD.       CT Facial Bones Without Contrast [[id number]] Collected: 03/02/21 1456     Updated: 03/02/21 2205    Narrative:      EXAMINATION: CT FACIAL BONES WO CONTRAST- 03/02/2021     INDICATION: Right parotid region mass, AMS     TECHNIQUE: Spiral acquisition 2 mm axial images through the face with  sagittal and coronal 2-D reconstructions.     The radiation dose reduction device was turned on for  each scan per the  ALARA (As Low as Reasonably Achievable) protocol.     COMPARISON: NONE     FINDINGS: There is diffuse enlargement of the right parotid gland, with  reticular pattern of diffuse edema, but no evidence of a discrete  parotid mass to suggest underlying tumor. Active inflammation is  favored. There is also subcutaneous fat stranding, consistent with  edema/inflammation. Right submandibular gland is a little larger than  left, but there is minimal if any right-sided mandibular gland  inflammation. Left parotid gland appears normal. No parotid  calcifications are seen. I do not identify any potential parotid duct  calcifications.     Elsewhere, remaining superficial soft tissues of the face, and included  deep soft tissues of the neck appear grossly normal. No gross  abnormalities are seen in the orbits, or most of the paranasal sinuses.  The left frontal sinus is opacified. Milder disease in the right frontal  sinus and adjacent ethmoid air cells, and moderate bilateral mastoid  disease.     On bone window settings, bony structures appear to be intact. No  odontogenic cyst or abscess is seen. No acute or healing trauma is  appreciated.       Impression:      1. Generalized enlargement/edema/inflammation of the right parotid  gland, and adjacent subcutaneous edema consistent with active  inflammation. No visible discrete parotid mass or regional adenopathy.  Perhaps mild involvement of the right submandibular gland.  2. Opacified left frontal sinus, milder right-sided frontal sinus  disease, and moderate bilateral mastoid disease.     D:  03/02/2021  E:  03/02/2021        This report was finalized on 3/2/2021 10:02 PM by Dr. Manny Buckley MD.       CT Chest Without Contrast Diagnostic [[id number]] Collected: 03/02/21 1451     Updated: 03/02/21 2204    Narrative:      EXAMINATION: CT CHEST WO CONTRAST, DIAGNOSTIC-03/02/2021:      INDICATION: Cough, AMS.     TECHNIQUE: 5 mm unenhanced images through the chest  and upper abdomen.     The radiation dose reduction device was turned on for each scan per the  ALARA (As Low as Reasonably Achievable) protocol.     COMPARISON: Portable chest radiograph of same date. Angiographic chest  CT scan 02/01/2014.     FINDINGS: History indicates cough and acute mental status change.     Mediastinal window images show no evidence of significant pericardial  effusion, mediastinal adenopathy or mass. There are very small bilateral  pleural effusions. Lung window images show mild-to-moderate bilateral  lower lobe atelectasis, and scattered ill-defined pulmonary  micronodules, mostly 1-3 mm in diameter, but probably more than a dozen  in total, and greater in the right upper lobe than elsewhere. The bony  structures appear heterogeneous/sclerotic, consistent with the patient's  history of prostate metastatic disease.       Impression:      1. Diffuse sclerotic disease of the visualized bony structures  consistent with the patient's known prostate metastatic disease.  2. Small pleural effusions and moderate bilateral lower lobe  atelectasis.  3. Faint new micronodular disease pattern of the lungs, questionable for  metastatic disease. New granulomatous infection is considered in the  differential.     D:  03/02/2021  E:  03/02/2021     This report was finalized on 3/2/2021 10:01 PM by Dr. Manny Buckley MD.             Assessment and Plan: Severe sepsis.  MRSA bacteremia.  MRSA urinary tract infection.  Recent falls with soft tissue defects on right foot, bilateral tibial surfaces, and back.  Bilateral pulmonary infiltrates with associated parapneumonic effusions.  Right parotitis.  Metastatic prostate carcinoma.  Altered mental status.  Acute kidney injury.  Lactic acidosis.  Elevated BNP.  Pre-existing DO NOT RESUSCITATE status/palliative care involved.  Maximum temperature over 24 hours 99.5.  Currently 99.3.  Pulse 109, respiratory rate 32-44 bpm and transiently hypotensive at 86/46 mmHg.  O2  saturation 87% on 5 L of humidified oxygen.  Marked dysglycemia with blood sugars of 4 4 3-5 4 6 mg percent.  Acute kidney injury with current creatinine rising to 1.99.  Peripheral leukocyte count 6.74.  Remains on intravenous daptomycin.  Grave prognosis.  Willing to discontinue antimicrobials at family request.      Cm Moctezuma MD  3/4/2021

## 2021-03-04 NOTE — NURSING NOTE
Patient with elevated blood sugars. Dr. George was notified by patient's primary nurse. Insulin drip ordered by said physician. This RN spoke with Dr. George regarding patient declining status and newly revised code status about no escalation of care. Dr. George discontinued insulin drip and asked that pharmacy discontinue the supporting orders of the insulin drip protocol. Levemir ordered, nurse notified. Continue to monitor.

## 2021-03-04 NOTE — DISCHARGE SUMMARY
Date of Death:  3/4/2021  Time of Death:  1458    Presenting Problem/History of Present Illness    Prostate cancer metastatic to multiple sites (CMS/[facility])        Hospital Course    Per Hospitalist HP:     Shlomo Mcallister is a 70 y.o. male PMH CAD, CHF, DM2, GERD, HLD, Prostate cancer with metastatic disease to ribs, right leg, brain 2014) presenting due to altered mental status.  I spoke with the patient's wife who states that symptoms began approximately a week ago.  Patient was placed on Xtampza for better pain control related to his metastatic disease, but his wife stopped the medication because he was having delusions.  His wife states he has been reporting increased shortness of breath.  Patient has not required oxygen at home.  Presently, patient is requiring 45 L per nasal cannula.  Spouse states that he stopped walking approximately 1 week ago because of pain, fatigue, shortness of breath.  He has had nothing substantially for the last 3 to 4 days.  His last bowel movement was approximately a week ago.  Spoke with the wife extensively regarding CODE STATUS.  Patient spouse wishes for him to be a DNR. HE had a fall approximately week ago, wife stating no known injury, he did not hit his head.      [end of copied text]     Mr. Mcallister was admitted to [facility] on 3/4/2021 for mgmt of acute symptoms 2/2 metastatic prostate cancer.       Social History:  Social History     Tobacco Use   • Smoking status: Former Smoker   • Smokeless tobacco: Never Used   Substance Use Topics   • Alcohol use: Not Currently         Consults:   Consults     Date and Time Order Name Status Description    3/3/2021 1134 Inpatient Palliative Care MD Consult Completed     3/3/2021 1043 Inpatient Infectious Diseases Consult Completed           Anabela Harrington DNP, MHA, LORETO  [facility] Care Navigators  Hospice and Palliative Care Nurse Practitioner  03/04/21  15:53 EST

## 2021-03-04 NOTE — H&P
.  Hospice History and Physical     Patient Name:  Shlomo Mcallister   : 1950   Sex: male    Patient Care Team:  Provider, No Known as PCP - General  Provider, No Known as PCP - Family Medicine    Code Status: Comfort Measures    Subjective     Per Hospitalist HP:    Shlomo Mcallister is a 70 y.o. 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  I spoke with the patient's wife who states that symptoms began approximately a week ago.  Patient was placed on Xtampza for better pain control related to his metastatic disease, but his wife stopped the medication because he was having delusions.  His wife states he has been reporting increased shortness of breath.  Patient has not required oxygen at home.  Presently, patient is requiring 45 L per nasal cannula.  Spouse states that he stopped walking approximately 1 week ago because of pain, fatigue, shortness of breath.  He has had nothing substantially for the last 3 to 4 days.  His last bowel movement was approximately a week ago.  Spoke with the wife extensively regarding CODE STATUS.  Patient spouse wishes for him to be a DNR. HE had a fall approximately week ago, wife stating no known injury, he did not hit his head.     [end of copied text]    Mr. Mcallister was admitted to [facility] on 3/4/2021 for mgmt of acute symptoms 2/2 metastatic prostate cancer.       History  Past Medical History:   Diagnosis Date   • Anemia 2021   • CAD (coronary artery disease) 2021   • CHF (congestive heart failure) (CMS/[facility]) 2021   • DM2 (diabetes mellitus, type 2) (CMS/[facility]) 2021   • DVT (deep venous thrombosis) (CMS/[facility])    • GERD without esophagitis 2021   • HLD (hyperlipidemia) 2021     Past Surgical History:   Procedure Laterality Date   • ND REANOMAL CORON ART PA ORIGIN BY GRAFT     • TURP / TRANSURETHRAL INCISION / DRAINAGE PROSTATE       Current  Facility-Administered Medications   Medication Dose Route Frequency Provider Last Rate Last Admin   • [START ON 3/5/2021] acetaminophen (TYLENOL) suppository 650 mg  650 mg Rectal Q4H PRN Anabela Harrington APRN       • bisacodyl (DULCOLAX) suppository 10 mg  10 mg Rectal Daily PRN Anabela Harrington APRN       • castor oil-balsam peru (VENELEX) ointment   Topical Q12H Carina Dietrich DO       • erythromycin (ROMYCIN) ophthalmic ointment   Both Eyes Q12H Carina Dietrich DO       • furosemide (LASIX) injection 20 mg  20 mg Intravenous Q6H PRN Anabela Harrington APRN       • glycopyrrolate (ROBINUL) injection 0.2 mg  0.2 mg Intravenous Q4H PRN Carina Dietrich DO       • haloperidol lactate (HALDOL) injection 1 mg  1 mg Intravenous Q4H PRN Anabela Harrington APRN       • ketorolac (TORADOL) injection 15 mg  15 mg Intravenous Q6H PRN Anabela Harrington, LORETO       • Morphine sulfate (PF) injection 4 mg  4 mg Intravenous Q1H PRN Carina Dietrich DO       • Morphine sulfate (PF) injection 4 mg  4 mg Intravenous Q4H Anabela Harrington APRN   4 mg at 03/04/21 1404   • Scopolamine (TRANSDERM-SCOP) 1.5 MG/3DAYS patch 1 patch  1 patch Transdermal Q72H PRN Anabela Harrington APRN       • sodium chloride 0.9 % flush 10 mL  10 mL Intravenous Q12H Carina Dietrich DO       • sodium chloride 0.9 % flush 10 mL  10 mL Intravenous PRN Carina Dietrich DO            •  [START ON 3/5/2021] acetaminophen  •  bisacodyl  •  furosemide  •  glycopyrrolate  •  haloperidol lactate  •  ketorolac  •  Morphine  •  Scopolamine  •  sodium chloride  Allergies   Allergen Reactions   • Uroxatral [Alfuzosin Hcl Er] GI Intolerance     No family history on file.  Social History     Socioeconomic History   • Marital status:      Spouse name: Not on file   • Number of children: Not on file   • Years of education: Not on file   • Highest education level: Not on file   Tobacco  Use   • Smoking status: Former Smoker   • Smokeless tobacco: Never Used   Substance and Sexual Activity   • Alcohol use: Not Currently   • Drug use: Never   • Sexual activity: Defer   Social History Narrative    Patient lives in [place] with Wife, she is his primary caregiver       Objective     Vital Signs  Temp:  [97.8 °F (36.6 °C)-99.8 °F (37.7 °C)] 99.8 °F (37.7 °C)  Heart Rate:  [108-120] 109  Resp:  [28-40] 28  BP: ()/(34-66) 103/46    PPS: Palliative Performance Scale score as of 3/4/2021, 15:07 EST is 10% based on the following measures:   Ambulation: Totally bed bound  Activity and Evidence of Disease: Unable to do any work, extensive evidence of disease  Self-Care: Total care  Intake:  Mouth care only  LOC: Drowsy or coma        Results Review:   Lab Results   Component Value Date    HGBA1C 9.50 (H) 01/16/2021       Lab Results   Component Value Date    GLUCOSE 488 (C) 03/04/2021    BUN 90 (H) 03/04/2021    CREATININE 2.36 (H) 03/04/2021    EGFRIFNONA 28 (L) 03/04/2021    BCR 38.1 (H) 03/04/2021    K 4.5 03/04/2021    CO2 25.0 03/04/2021    CALCIUM 7.1 (L) 03/04/2021    ALBUMIN 1.60 (L) 03/04/2021     (H) 03/04/2021    ALT 21 03/04/2021       WBC   Date Value Ref Range Status   03/04/2021 6.62 3.40 - 10.80 10*3/mm3 Final     RBC   Date Value Ref Range Status   03/04/2021 2.94 (L) 4.14 - 5.80 10*6/mm3 Final     Hemoglobin   Date Value Ref Range Status   03/04/2021 8.3 (L) 13.0 - 17.7 g/dL Final     Hematocrit   Date Value Ref Range Status   03/04/2021 29.6 (L) 37.5 - 51.0 % Final     MCV   Date Value Ref Range Status   03/04/2021 100.7 (H) 79.0 - 97.0 fL Final     MCH   Date Value Ref Range Status   03/04/2021 28.2 26.6 - 33.0 pg Final     MCHC   Date Value Ref Range Status   03/04/2021 28.0 (L) 31.5 - 35.7 g/dL Final     RDW   Date Value Ref Range Status   03/04/2021 21.2 (H) 12.3 - 15.4 % Final     RDW-SD   Date Value Ref Range Status   03/04/2021 76.8 (H) 37.0 - 54.0 fl Final     MPV   Date  Value Ref Range Status   2021 13.7 (H) 6.0 - 12.0 fL Final     Platelets   Date Value Ref Range Status   2021 38 (C) 140 - 450 10*3/mm3 Final     Neutrophil %   Date Value Ref Range Status   2021 68.0 42.7 - 76.0 % Final     Lymphocyte %   Date Value Ref Range Status   2021 15.3 (L) 19.6 - 45.3 % Final     Monocyte %   Date Value Ref Range Status   2021 8.9 5.0 - 12.0 % Final     Eosinophil %   Date Value Ref Range Status   2021 0.3 0.3 - 6.2 % Final     Basophil %   Date Value Ref Range Status   2021 1.0 0.0 - 1.5 % Final     Immature Grans %   Date Value Ref Range Status   2021 6.5 (H) 0.0 - 0.5 % Final     Neutrophils Absolute   Date Value Ref Range Status   2021 4.63 1.70 - 7.00 10*3/mm3 Final     Neutrophils, Absolute   Date Value Ref Range Status   2021 4.58 1.70 - 7.00 10*3/mm3 Final     Lymphocytes, Absolute   Date Value Ref Range Status   2021 1.03 0.70 - 3.10 10*3/mm3 Final     Monocytes, Absolute   Date Value Ref Range Status   2021 0.60 0.10 - 0.90 10*3/mm3 Final     Eosinophils Absolute   Date Value Ref Range Status   2021 0.00 0.00 - 0.40 10*3/mm3 Final     Eosinophils, Absolute   Date Value Ref Range Status   2021 0.02 0.00 - 0.40 10*3/mm3 Final     Basophils Absolute   Date Value Ref Range Status   2021 0.00 0.00 - 0.20 10*3/mm3 Final     Basophils, Absolute   Date Value Ref Range Status   2021 0.07 0.00 - 0.20 10*3/mm3 Final     Immature Grans, Absolute   Date Value Ref Range Status   2021 0.44 (H) 0.00 - 0.05 10*3/mm3 Final     nRBC   Date Value Ref Range Status   2021 1.8 (H) 0.0 - 0.2 /100 WBC Final         Prostate cancer metastatic to multiple sites (CMS/HCC)      Assessment/Plan   Assessment/Plan:     Pt  prior to being seen by provider.    Justification for care:  Patient meets criteria for acute in-patient care with required nursing assessment and interventions for symptoms with IV  medications.      Anabela Harrington, SHLOMO, MHA, APRN  [facility]  Hospice and Palliative Care Nurse Practitioner  03/04/21  15:06 EST

## 2021-03-05 LAB
BACTERIA SPEC AEROBE CULT: ABNORMAL
BACTERIA SPEC AEROBE CULT: ABNORMAL
BACTERIA SPEC RESP CULT: ABNORMAL
BACTERIA SPEC RESP CULT: ABNORMAL
GRAM STN SPEC: ABNORMAL
ISOLATED FROM: ABNORMAL
ISOLATED FROM: ABNORMAL

## 2021-03-06 NOTE — DISCHARGE SUMMARY
[facility] Medicine Services  DISCHARGE TO INPATIENT HOSPICE    Patient Name: Shlomo Mcallister  : 1950  MRN: [id number]    Date of Admission: 3/2/2021  Date of Discharge:  3/4/21  Primary Care Physician: Provider, No Known    Consults     Date and Time Order Name Status Description    3/3/2021 11:34 AM Inpatient Palliative Care MD Consult Completed     3/3/2021 10:43 AM Inpatient Infectious Diseases Consult Completed         Hospital Course     Presenting Problem:   Sepsis secondary to UTI (CMS/HCC) [A41.9, N39.0]  Sepsis secondary to UTI (CMS/HCC) [A41.9, N39.0]    Active Hospital Problems    Diagnosis  POA   • **Sepsis secondary to UTI (CMS/HCC) [A41.9, N39.0]  Yes   • Elevated troponin [R77.8]  Yes   • Parotitis, acute [K11.21]  Yes   • CHF (congestive heart failure) (CMS/[facility]) [I50.9]  Yes   • DM2 (diabetes mellitus, type 2) (CMS/[facility]) [E11.9]  Yes   • Prostate cancer metastatic to bone (CMS/[facility]) [C61, C79.51]  Yes   • Essential hypertension [I10]  Yes   • HLD (hyperlipidemia) [E78.5]  Yes   • GERD without esophagitis [K21.9]  Yes   • CAD (coronary artery disease) [I25.10]  Yes      Resolved Hospital Problems   No resolved problems to display.          Hospital Course:  Shlomo Mcallister is a 70 y.o. male with history of CAD, h/o CABG and coronary stents, h/o bilateral pulmonary emboli on chronic xarelto, anemia, HTN, CHF, DM 2, GERD, hyperlipidemia and prostate cancer with metastatic disease to ribs, right leg and brain presents with altered mental status. HE was found to be septic, bacteremic, with UTI, pneumonia and lacitc acidosis. He declined during the hospital stay and it was determined to put on comfort measures.     Day of Discharge     HPI:   Patient non verbal   ROS:  Unable to obtain    Vital Signs:         Physical Exam:  Constitutional: chronically ill male resting in bed   HENT: NCAT, mucous membranes dry   Respiratory: respiratory effort normal   Cardiovascular: RRR,  no murmurs, rubs, or gallops  Gastrointestinal:  nondistended  Musculoskeletal: No bilateral ankle edema  Psychiatric: unable to assess   Neurologic opens eyes to stimuli   Skin: palor       Discharge Details     Discharge Disposition:  Transfer care to inpatient Hospice at [facility]    Time Spent on Discharge:  20 minutes    Carina Dietrich DO  03/06/21